# Patient Record
Sex: MALE | Race: WHITE | NOT HISPANIC OR LATINO | Employment: OTHER | URBAN - METROPOLITAN AREA
[De-identification: names, ages, dates, MRNs, and addresses within clinical notes are randomized per-mention and may not be internally consistent; named-entity substitution may affect disease eponyms.]

---

## 2022-01-19 ENCOUNTER — DOCUMENTATION ONLY (OUTPATIENT)
Dept: ADMINISTRATIVE | Facility: OTHER | Age: 67
End: 2022-01-19

## 2022-01-20 NOTE — PROGRESS NOTES
NAME: Seven Freire   : 1955 SEX: ADRIÁN MR#: [id number]   DIAGNOSIS: 1.  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  Diagnosis in 2022.  Unknown primary and exam under anesthesia with tonsillectomy and random biopsies pending.    2. PET/CT shows indeterminate hypermetabolism without CT lesion in the sternum and L1 / L2 vertebral body.     REFERRING PHYSICIAN: Hudson Seals M.D.   DATE OF CONSULTATION: 2022     HISTORY OF PRESENT ILLNESS:  The patient is a 66-year-old  white male.  According to the patient and his wife, in 2021 after a 20 year history of narcotic pain medicine use, the patient stopped pain medicines cold turkey and has been off all narcotic pain medicines since September.  According to the patient, he has not felt quite right since September and he has severe fatigue.     On January 3 of this year, the patient saw Dr. Seals with complaints of a right neck mass that he had noticed since September.     On  of this year, a CT scan of the neck was performed with IV contrast.  I have reviewed the images as well as read the radiologist's interpretation.  There is a 3.2 cm peripheral enhancing mass in the right level III region of the neck.  Central hypodensity is noted consistent with necrosis.  There are several other smaller lymph nodes bilaterally but none of them are pathologic size.  The mass does abut the right common carotid artery.  The patient has severe narrowing of the left internal carotid artery.      Also on , a CT scan of the chest was performed with IV contrast.  I have reviewed the images as well as read the radiologist's interpretation.  There are no definite abnormalities except for emphysema.     On , the patient saw Dr. eSals.  His physical examination confirmed the right level II lymph node.  Dr. Seals's physical examination was negative for the site of the presumed head/neck  primary.    On January 7, a fine needle aspirate of the right neck was performed.  Pathology showed squamous cell carcinoma.  I have requested p16 staining as well as Jorge-Barr virus staining be performed.     On January 17 of this year, a PET-CT scan was performed for both diagnostic as well as radiation therapy treatment planning purposes.  The scan was performed with and without IV contrast.  I have reviewed the images as well as read the radiologist's interpretation.  There is a hypermetabolic 3.8 cm enhancing right level III lymph node.  There is also an immediately adjacent 12 mm lymph node that is also hypermetabolic.  There is no suggestion of a primary tumor.  The radiologist mentioned a small area of hypermetabolism noted in the sternum, and I think this is indeterminate and not completely convincing for cancer.  There is also an area of hypermetabolism in the L2 vertebral body (this is assuming the patient has five lumbar spine).  This area in the L-spine also is indeterminate and not completely convincing for cancer.     Current CBC and comprehensive metabolic profile are unremarkable, and the patient has an EGFR of 81.  The patient has been referred for Radiation Oncology consultation.     The patient's greatest symptom is a mass in his right neck.  He occasionally has pain in this mass and occasionally the pain can be as severe as a 5/10 scale pain.  Currently he is controlling the pain fairly well with only ibuprofen.  He denies any other pain except for a number of decade history of back pain.  As previously mentioned, he was on narcotic pain medications for 20 years, but as of September of last year he is off all narcotic pain medicines.     The patient denies swallowing problems or hoarseness.  The patient denies cranial nerve symptoms.  The patient does complain of a lot of fatigue since getting off all narcotic pain medicines in September of last year.  The patient does still have a fairly  good performance status.  His weight is stable at 194 pounds.    PAST MEDICAL HISTORY:  The patient has filled out forms include symptoms as well as past medical history, and relevant factors have been reviewed.     Medical:  Squamous cell carcinoma metastatic to right cervical lymph node, hypertension, 20 year history of narcotic pain medicine use because of back pain.     No previous radiation therapy or chemotherapy history.    Previous surgeries:  None.    FAMILY HISTORY:  Positive for a sister with leukemia.    TOBACCO HISTORY:  The patient smokes a pack a day for a total of 30-40 years.  He is still smoking.  The patient has stopped smoking sometimes in the past, although currently is still smoking a pack a day.    ALCOHOL:  Daily three drinks.    ALLERGIES:  No known allergies.    MEDICATIONS:  The patient is on two medications and they are recorded in the clinic records.    PHYSICAL EXAMINATION:  Exam shows a man in no distress.  He has a fairly good performance status (performance score = 1).  There is no left cervical lymphadenopathy.  The right neck shows a 5 cm level III lymph node that is highly tethered in the right mid neck.  Cranial nerve examination is unremarkable.  Examination of the oral cavity shows that the patient has almost all of his upper and lower teeth and they appear to be in okay condition.  I do not see any suspicious oral cavity or oropharynx lesions.     Fiberoptic examination of the larynx and entire pharynx was completely unremarkable and no evidence for primary tumor.    DIAGNOSTIC DATA:  See History of Present Illness.    ASSESSMENTS/GOALS/PLAN:  The patient has a history of squamous cell carcinoma metastatic to a level III right cervical lymph node.  The primary is unknown and exam under anesthesia with tonsillectomy and random biopsies pending.  The current PET/CT shows indeterminate hypermetabolism without CT lesion in the sternum and L1 / L2 vertebral body.      I have  reviewed the NCCN guidelines for appropriate workup and treatment.  I have also discussed his case with Dr. Seals.  I have discussed this case in detail with the patient and his wife.  There is a very high likelihood for primary carcinoma being in the pharynx or the larynx, although there is no evidence for a primary tumor on history, physical exam, PET scan, or CT scan.  As per NCCN recommendations, I have ordered that the fine needle aspirate of the right neck have staining for human papilloma virus and Jorge-Barr virus.  On January 28, the patient will be taken to surgery by Dr. Seals and an exam under anesthesia with tonsillectomy and biopsies of suspected possible sites of primary will be performed.  Since I think there is a very high likelihood that the patient will need radiation treatment, I have gotten the patient scheduled to see the oncologic dentist on January 26.  In the future, the patient will be referred for speech and swallowing.  The patient was told in the strongest terms that he must stop smoking or suffer very serious consequences.  I offered stop smoking therapy for the patient but he thinks he will be able to stop on his own just as he was able to cold turkey stop narcotic pain medications.  I will see the patient back in follow up following the upcoming January 28 exam under anesthesia with biopsies.   Over 115 minutes were spent in consultation of which at least 35 minutes were spent in record and image review, 65 minutes were spent directly with the patient and his wife, and 15 minutes were spent for documentation and guideline review.     I would like to thank all of Mr. Freire' physicians for the opportunity to consult on their patients.  Any of these physicians should feel free to call me with any questions or comments.      Electronically Signed By:  MARISA Uriarte/Gabe  DD:  01/20/2022  DT:  01/20/2022  Job #:   [id number]    CC:  Hudson Seals M.D., [address] [place] [address], Fax: [phone]    Ewa Jaramillo M.D., [address] [place] [id number], Fax: [phone]

## 2022-02-02 ENCOUNTER — DOCUMENTATION ONLY (OUTPATIENT)
Dept: ADMINISTRATIVE | Facility: OTHER | Age: 67
End: 2022-02-02

## 2022-02-03 NOTE — PROGRESS NOTES
2022      Hudson Seals M.D.   [address] [facility] [place] [address]      RE: Seven Freire    MR#:  [id number]    :  1955       DX:     1.  Squamous cell carcinoma metastatic to level III right cervical lymph nodes.  Diagnosis in 2022.  Unknown primary.  Group clinical stage SHERWIN. cT0  (imaging, exam under anesthesia with tonsillectomy and random biopsies are all negative for a primary)   cN2b  ( 3.8 cm and immediately adjacent 1.2 cm level III right cervical lymph nodes)  cM0.  Heavy smoking history and probably p16 negative, although insufficient tissue for p16 or EBV  evaluation on the right neck  biopsy.    2.  PET/CT shows indeterminate hypermetabolism without CT lesion in the sternum and L1 /L2 vertebral body.         Dear Hudson:     I am seeing your patient in follow up today, and this note will supplement information since I saw the patient in consultation on  of this year.  I had requested p16 and Jorge-Barr virus (EBV) testing on the  biopsy.  Unfortunately, there was insufficient to allow this test to be performed.     On , the patient saw the oncologic dentist and seven teeth were recommended for extraction.  The seven teeth were in fact extracted on .     On , you took the patient to surgery for an exam under anesthesia, bilateral tonsillectomies, biopsies of the base of tongue and nasopharynx, rigid esophagoscopy and direct laryngoscopy.  All the base of tongue biopsies, nasopharynx biopsies and the bilateral tonsillectomy biopsies were all negative for cancer.     The  patient admitted that he has not been able to stop smoking.  The patient will be talking with Dr. Jaramillo about starting Chantix so that he will be able to stop smoking.  The patient does have some postoperative pain.  He has run out of his pain medications and I got him a prescription for Percocet 5.     Assessment/Plan:  The patient has a true  unknown primary, although almost certainly this is an unknown primary from head and neck origin.  I think that chances are very high that this is p16 negative given the patient's heavy smoking history, although there was insufficient tissue to have this evaluated on the January 7 biopsy.      I have reviewed the NCCN guidelines and I have discussed the case in detail with you on the phone this morning.  According the NCCN guidelines, the top option for treatment in this situation is a neck dissection and then the patient would definitely need postoperative irradiation.  If for whatever reason, neck dissection was not performed, concurrent irradiation and chemotherapy was a possibility, although this was not the top choice.  I had a detailed discussion with the patient and his wife about this and they are in agreement.  After discussion with you, I will be getting the patient scheduled to see Dr. Sellers in the near future. The images are in Epic so that Dr. Sellers can look at the January 17 PET-CT scans and also the CT scans with IV contrast.   I have very strongly encouraged the patient to do whatever is possible to stop smoking.   Forty minutes were spent in this follow up of which five minutes were spent in record review, 25 minutes were spent directly with the patient and his wife, and 10 minutes were spent on documentation and guideline review.     I appreciate the opportunity to consult on your patients.  Please call me with any questions or comments.     Sincerely,        Electronically Signed By:  MARISA Uriarte/Gabe  DD:  02/02/2022  DT:  02/02/2022  Job #:  [id number]    CC:  Ewa Jaramillo M.D., [address] [place] [address], Fax: [phone]        Juanita Sellers M.D., [address], [place] [address], Fax: [phone]

## 2022-02-08 ENCOUNTER — TELEPHONE (OUTPATIENT)
Dept: HEMATOLOGY/ONCOLOGY | Facility: CLINIC | Age: 67
End: 2022-02-08
Payer: MEDICARE

## 2022-02-08 NOTE — NURSING
Received referral from Dr. Wallace for patient to see Dr. Sellers. Appointment has been schedule and reviewed with patient. Directions provided to patient and he verbalized understanding    Oncology Navigation   Intake  Cancer Type: Head and Neck  Internal / External Referral: External  Referral Source: Dr. Boudreaux and Dr. Seals  Date of Referral: 2/7/2022  Initial Nurse Navigator Contact: 2/8/2022  Referral to Initial Contact Timeline (days): 1  Date Worked: 2/8/2022  First Appointment Available: 2/15/2022  Appointment Date: 2/15/2022  First Available Date vs. Scheduled Date (days): 0     Treatment     Surgical Oncologist: Dr. Juanita Sellers  Type of Surgery: Possible Neck Dissection  Consult Date: 2/15/2022       Radiation Oncologist: Dr. Christian Jean consult on 1/19/22    Procedures: Biopsy  Biopsy Schedule Date: 1/7/2022 ([facility] Biopsy)          Radiation Oncologist: Dr. Christian Jean consult on 1/19/22        Acuity      Follow Up  No follow-ups on file.

## 2022-02-15 ENCOUNTER — OFFICE VISIT (OUTPATIENT)
Dept: HEMATOLOGY/ONCOLOGY | Facility: CLINIC | Age: 67
End: 2022-02-15
Payer: MEDICARE

## 2022-02-15 ENCOUNTER — LAB VISIT (OUTPATIENT)
Dept: LAB | Facility: HOSPITAL | Age: 67
End: 2022-02-15
Attending: OTOLARYNGOLOGY
Payer: MEDICARE

## 2022-02-15 VITALS
SYSTOLIC BLOOD PRESSURE: 147 MMHG | DIASTOLIC BLOOD PRESSURE: 85 MMHG | HEART RATE: 96 BPM | TEMPERATURE: 98 F | BODY MASS INDEX: 27.23 KG/M2 | RESPIRATION RATE: 18 BRPM | OXYGEN SATURATION: 97 % | WEIGHT: 201.06 LBS | HEIGHT: 72 IN

## 2022-02-15 DIAGNOSIS — Z72.0 TOBACCO ABUSE: ICD-10-CM

## 2022-02-15 DIAGNOSIS — D62 ANEMIA DUE TO ACUTE BLOOD LOSS: ICD-10-CM

## 2022-02-15 DIAGNOSIS — Z01.818 PRE-OP EVALUATION: ICD-10-CM

## 2022-02-15 DIAGNOSIS — C79.89 SECONDARY SQUAMOUS CELL CARCINOMA OF HEAD AND NECK WITH UNKNOWN PRIMARY SITE: ICD-10-CM

## 2022-02-15 DIAGNOSIS — C80.1 SECONDARY SQUAMOUS CELL CARCINOMA OF HEAD AND NECK WITH UNKNOWN PRIMARY SITE: ICD-10-CM

## 2022-02-15 DIAGNOSIS — F32.A DEPRESSION, UNSPECIFIED DEPRESSION TYPE: ICD-10-CM

## 2022-02-15 DIAGNOSIS — G89.18 POST-OP PAIN: Primary | ICD-10-CM

## 2022-02-15 DIAGNOSIS — G89.18 POST-OP PAIN: ICD-10-CM

## 2022-02-15 PROBLEM — C44.92 SECONDARY SQUAMOUS CELL CARCINOMA OF HEAD AND NECK WITH UNKNOWN PRIMARY SITE: Status: ACTIVE | Noted: 2022-02-15

## 2022-02-15 LAB
BASOPHILS # BLD AUTO: 0.03 K/UL (ref 0–0.2)
BASOPHILS NFR BLD: 0.3 % (ref 0–1.9)
DIFFERENTIAL METHOD: ABNORMAL
EOSINOPHIL # BLD AUTO: 0.1 K/UL (ref 0–0.5)
EOSINOPHIL NFR BLD: 0.5 % (ref 0–8)
ERYTHROCYTE [DISTWIDTH] IN BLOOD BY AUTOMATED COUNT: 13.1 % (ref 11.5–14.5)
HCT VFR BLD AUTO: 31.4 % (ref 40–54)
HGB BLD-MCNC: 10.2 G/DL (ref 14–18)
IMM GRANULOCYTES # BLD AUTO: 0.08 K/UL (ref 0–0.04)
IMM GRANULOCYTES NFR BLD AUTO: 0.7 % (ref 0–0.5)
LYMPHOCYTES # BLD AUTO: 1.6 K/UL (ref 1–4.8)
LYMPHOCYTES NFR BLD: 13.3 % (ref 18–48)
MCH RBC QN AUTO: 33.3 PG (ref 27–31)
MCHC RBC AUTO-ENTMCNC: 32.5 G/DL (ref 32–36)
MCV RBC AUTO: 103 FL (ref 82–98)
MONOCYTES # BLD AUTO: 1.3 K/UL (ref 0.3–1)
MONOCYTES NFR BLD: 10.5 % (ref 4–15)
NEUTROPHILS # BLD AUTO: 8.9 K/UL (ref 1.8–7.7)
NEUTROPHILS NFR BLD: 74.7 % (ref 38–73)
NRBC BLD-RTO: 0 /100 WBC
PLATELET # BLD AUTO: 323 K/UL (ref 150–450)
PMV BLD AUTO: 8.5 FL (ref 9.2–12.9)
RBC # BLD AUTO: 3.06 M/UL (ref 4.6–6.2)
WBC # BLD AUTO: 11.9 K/UL (ref 3.9–12.7)

## 2022-02-15 PROCEDURE — 1101F PT FALLS ASSESS-DOCD LE1/YR: CPT | Mod: CPTII,S$GLB,, | Performed by: OTOLARYNGOLOGY

## 2022-02-15 PROCEDURE — 1160F PR REVIEW ALL MEDS BY PRESCRIBER/CLIN PHARMACIST DOCUMENTED: ICD-10-PCS | Mod: CPTII,S$GLB,, | Performed by: OTOLARYNGOLOGY

## 2022-02-15 PROCEDURE — 99999 PR PBB SHADOW E&M-EST. PATIENT-LVL V: ICD-10-PCS | Mod: PBBFAC,,, | Performed by: OTOLARYNGOLOGY

## 2022-02-15 PROCEDURE — 85025 COMPLETE CBC W/AUTO DIFF WBC: CPT | Mod: PN | Performed by: OTOLARYNGOLOGY

## 2022-02-15 PROCEDURE — 99999 PR PBB SHADOW E&M-EST. PATIENT-LVL V: CPT | Mod: PBBFAC,,, | Performed by: OTOLARYNGOLOGY

## 2022-02-15 PROCEDURE — 36415 COLL VENOUS BLD VENIPUNCTURE: CPT | Mod: PN | Performed by: OTOLARYNGOLOGY

## 2022-02-15 PROCEDURE — 3079F PR MOST RECENT DIASTOLIC BLOOD PRESSURE 80-89 MM HG: ICD-10-PCS | Mod: CPTII,S$GLB,, | Performed by: OTOLARYNGOLOGY

## 2022-02-15 PROCEDURE — 3288F PR FALLS RISK ASSESSMENT DOCUMENTED: ICD-10-PCS | Mod: CPTII,S$GLB,, | Performed by: OTOLARYNGOLOGY

## 2022-02-15 PROCEDURE — 99204 OFFICE O/P NEW MOD 45 MIN: CPT | Mod: 25,S$GLB,, | Performed by: OTOLARYNGOLOGY

## 2022-02-15 PROCEDURE — 1125F AMNT PAIN NOTED PAIN PRSNT: CPT | Mod: CPTII,S$GLB,, | Performed by: OTOLARYNGOLOGY

## 2022-02-15 PROCEDURE — 31575 DIAGNOSTIC LARYNGOSCOPY: CPT | Mod: S$GLB,,, | Performed by: OTOLARYNGOLOGY

## 2022-02-15 PROCEDURE — 1159F MED LIST DOCD IN RCRD: CPT | Mod: CPTII,S$GLB,, | Performed by: OTOLARYNGOLOGY

## 2022-02-15 PROCEDURE — 4010F ACE/ARB THERAPY RXD/TAKEN: CPT | Mod: CPTII,S$GLB,, | Performed by: OTOLARYNGOLOGY

## 2022-02-15 PROCEDURE — 4010F PR ACE/ARB THEARPY RXD/TAKEN: ICD-10-PCS | Mod: CPTII,S$GLB,, | Performed by: OTOLARYNGOLOGY

## 2022-02-15 PROCEDURE — 99204 PR OFFICE/OUTPT VISIT, NEW, LEVL IV, 45-59 MIN: ICD-10-PCS | Mod: 25,S$GLB,, | Performed by: OTOLARYNGOLOGY

## 2022-02-15 PROCEDURE — 1160F RVW MEDS BY RX/DR IN RCRD: CPT | Mod: CPTII,S$GLB,, | Performed by: OTOLARYNGOLOGY

## 2022-02-15 PROCEDURE — 3008F PR BODY MASS INDEX (BMI) DOCUMENTED: ICD-10-PCS | Mod: CPTII,S$GLB,, | Performed by: OTOLARYNGOLOGY

## 2022-02-15 PROCEDURE — 3288F FALL RISK ASSESSMENT DOCD: CPT | Mod: CPTII,S$GLB,, | Performed by: OTOLARYNGOLOGY

## 2022-02-15 PROCEDURE — 3008F BODY MASS INDEX DOCD: CPT | Mod: CPTII,S$GLB,, | Performed by: OTOLARYNGOLOGY

## 2022-02-15 PROCEDURE — 1159F PR MEDICATION LIST DOCUMENTED IN MEDICAL RECORD: ICD-10-PCS | Mod: CPTII,S$GLB,, | Performed by: OTOLARYNGOLOGY

## 2022-02-15 PROCEDURE — 1101F PR PT FALLS ASSESS DOC 0-1 FALLS W/OUT INJ PAST YR: ICD-10-PCS | Mod: CPTII,S$GLB,, | Performed by: OTOLARYNGOLOGY

## 2022-02-15 PROCEDURE — 3077F SYST BP >= 140 MM HG: CPT | Mod: CPTII,S$GLB,, | Performed by: OTOLARYNGOLOGY

## 2022-02-15 PROCEDURE — 1125F PR PAIN SEVERITY QUANTIFIED, PAIN PRESENT: ICD-10-PCS | Mod: CPTII,S$GLB,, | Performed by: OTOLARYNGOLOGY

## 2022-02-15 PROCEDURE — 3077F PR MOST RECENT SYSTOLIC BLOOD PRESSURE >= 140 MM HG: ICD-10-PCS | Mod: CPTII,S$GLB,, | Performed by: OTOLARYNGOLOGY

## 2022-02-15 PROCEDURE — 3079F DIAST BP 80-89 MM HG: CPT | Mod: CPTII,S$GLB,, | Performed by: OTOLARYNGOLOGY

## 2022-02-15 PROCEDURE — 31575 PR LARYNGOSCOPY, FLEXIBLE; DIAGNOSTIC: ICD-10-PCS | Mod: S$GLB,,, | Performed by: OTOLARYNGOLOGY

## 2022-02-15 RX ORDER — SILDENAFIL CITRATE 20 MG/1
TABLET ORAL
COMMUNITY
End: 2022-02-28 | Stop reason: CLARIF

## 2022-02-15 RX ORDER — HYDROCODONE BITARTRATE AND ACETAMINOPHEN 7.5; 325 MG/15ML; MG/15ML
15 SOLUTION ORAL EVERY 4 HOURS PRN
Qty: 500 ML | Refills: 0 | Status: SHIPPED | OUTPATIENT
Start: 2022-02-15 | End: 2022-02-15 | Stop reason: SDUPTHER

## 2022-02-15 RX ORDER — AMOXICILLIN AND CLAVULANATE POTASSIUM 875; 125 MG/1; MG/1
TABLET, FILM COATED ORAL
COMMUNITY
Start: 2022-02-14 | End: 2022-02-24

## 2022-02-15 RX ORDER — HYDROCODONE BITARTRATE AND ACETAMINOPHEN 7.5; 325 MG/15ML; MG/15ML
15 SOLUTION ORAL EVERY 4 HOURS PRN
Qty: 500 ML | Refills: 0 | Status: SHIPPED | OUTPATIENT
Start: 2022-02-15 | End: 2022-02-22

## 2022-02-15 RX ORDER — LORAZEPAM 1 MG/1
1 TABLET ORAL DAILY PRN
COMMUNITY
Start: 2022-01-19 | End: 2022-08-29

## 2022-02-15 NOTE — PROGRESS NOTES
Date of Encounter: 2/15/2022  Provider: Juanita Sellers MD  Referring MD: Hudson Seals MD  PCP: Ewa Jaramillo MD  Rad Onc: Christian Wallace MD  Med Onc:    CC:  Unknown primary of the head neck Kimberlee juares and Christian Wallace, [id number] [facility]    HPI:    Patient is a 66-year-old male who presented for evaluation and treatment of neck disease of an unknown primary of the head neck by Donya Seals and Christian Wallace.  HPV and EBV status unknown at this time due to lack of pathologic material.  NCCN guidelines recommend up front neck dissection followed with radiation therapy +/- chemo pending final path as per Dr Wallace's note.  On presentation today he denies neck pain, otalgia, dysphagia, odynophagia.    Patient reports that he bled heavily following tonsillectomy.  He presented to a local urgent care.  Patient was told that he had an infection it was prescribed Augmentin.  He has been very weak following that episode and due to low blood pressure he has stopped his antihypertensives, Plavix and aspirin.  He did not consult with his physician 1st.    Patient also has a history of tobacco abuse and continues to smoke a pack of cigarettes a day.  He is not interested in tobacco cessation at this time.  He also has a history of depression for which he was taken Wellbutrin.  He also discontinued this as he thought it was not helping him    ROS: see HPI  Constitutional: Negative for activity change and appetite change, weight loss.   Eyes: Negative for discharge, visual changes.   Respiratory: Negative for difficulty breathing and wheezing   Cardiovascular: Negative for chest pain.   Gastrointestinal: Negative for abdominal distention and abdominal pain.   Endocrine: Negative for cold intolerance and heat intolerance.   Genitourinary: Negative for dysuria.   Musculoskeletal: Negative for gait problem, muscle pain and joint swelling.   Skin: Negative for color change and pallor; negative for skin lesions.    Neurological: Negative for syncope and weakness; no numbness face.   Psychiatric/Behavioral: Negative for agitation and confusion; negative for depression.    Physical Exam:      Constitutional  · General Appearance: well nourished, well-developed, alert, oriented, in no acute distress  · Communication: ability, understanding, normal  Head and Face  · Inspection: normocephalic, atraumatic, no scars, lesions or masses   · Palpation: no stepoffs, sinus tenderness or masses  · Parotid glands: no masses, stones, swelling or tenderness  Eyes  · Ocular Motility / Alignment: normal alignment, motility, no proptosis, enophthalmus or nystagmus  · Conjunctiva: not injected  · Eyelids: no hooding, lag, entropion, or ectropion  Nose  · External Nose: no lesions, tenderness, trauma or deformity  · Intranasal Exam: no edema, erythema, discharge, mass or obstruction  Oral Cavity / Oropharynx  · Lips: upper and lower lips pink and moist  · Teeth: good dentition  · Gingiva: healthy  · Oral Mucosa: moist, no mucosal lesions  · Floor of Mouth: normal, no lesions, salivary ducts patent  · Tongue: moist, normal mobility, no lesions  · Palate: soft and hard palates without lesions or ulcers  Oropharynx: tonsils absent; white eschar present bilaterally; no bleeding  Nasopharynx, Hypopharynx, and Larynx  · Indirect: could not perform exam due to intolerance by patient  Neck  · Inspection and Palpation: no erythema, induration, emphysema, tenderness or masses  · Larynx and Trachea: normal position; normal crepitus  · Thyroid: no tenderness, enlargement or nodules  · Submandibular Glands: no masses or tenderness  Lymphatic:  · Anterior, Posterior, Submandibular, Submental, Supraclavicular: right level 3 LN, firm and slightly mobile-feels attached to SCM  Chest / Respiratory  · Chest: no stridor or retractions, normal effort and expansion  Cardiovascular:  · Pulses: 2+ carotid pulses  bilaterally  · Auscultation: deferred  Neurological  · Cranial Nerves: grossly intact  · General: no focal deficits  Psychiatric  · Orientation: oriented to time, place and person  · Mood and Affect: no depression, anxiety or agitation  Extremities  · Inspection: moves all extremities well    PROCEDURES:   -------------------- LARYNGOSCOPY / NASOPHARYNGOSCOPY -------------------------     Pre-op DX: unknown primary  Post-op DX: same  Anesthesia: Topical Neosynephrine / Tetracaine  Indications: to look at larynx and pharynx  Adverse Events: None        Procedure in Detail:     Flexible endoscopy with video was performed through the nasal passages. The nasal cavity, nasopharynx, oropharynx, hypopharynx and larynx were adequately visualized. The true vocal cords and arytenoids were examined during phonation and repose.      Operative Findings:     Nasal Cavity: Within normal limits  Nasopharynx: Within normal limits  Tongue Base: Within normal limits  Pharyngeal Walls: Within normal limits  Epiglottis / Aryepiglottic Folds: Within normal limits  Pyriform Sinus: Within normal limits  Vocal Cords: Within normal limits  Arytenoids: Within normal limits; moderate IA edema     Pet CT: no formal report for my review  No primary or distant mets seen.  Hypermetabolic levels 2 and 3 lymph nodes    Images Reviewed: PET CT; CT neck with contrast:  Right-sided lymph nodes appear to involve the right sternocleidomastoid muscle and possibly the internal jugular vein versus compression of the vein    Path:  .  LEFT BASE OF TONGUE BIOPSY:   - NO TUMOR SEEN.     2.  RIGHT BASE OF TONGUE BIOPSIES:   - NO TUMOR SEEN.       3.  LEFT NASOPHARYNX BIOPSY:   - NO TUMOR SEEN.     4.  RIGHT NASOPHARYNX BIOPSY:   - NO TUMOR SEEN.       5.  RIGHT TONSIL, EXCISION:   - NO TUMOR SEEN.     6.  LEFT TONSIL, EXCISION:   - NO TUMOR SEEN.       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  Risks, benefits and alternatives were discussed at length with the patient and his wife using medical of the stray shins, questions were answered.  Risks include but are not limited to bleeding, scarring, infection, hematoma, seroma, Chyle leak, numbness of the skin of the neck for 6-12 months, soft tissue defect due to removal of tissue, unsatisfactory cosmesis, permanent numbness of the earlobe, edema of the neck and face postoperatively, injury to the cranial nerves with weak lower lip, tongue, shoulder with chronic pain, diaphragm, hoarseness and upper extremity and injury to the great vessels of the neck with bleeding stroke or death  Surgery is scheduled for March 2, 2022  He was instructed to resume his home meds.  He is to see his primary care this week.  CBC to evaluate anemia/blood loss    Addendum:  Hemoglobin 14.0 - 18.0 g/dL 10.2 Low   14.7    Hematocrit 40.0 - 54.0 % 31.4 Low   43.1      Will type and screen patient for 2 U PRBC's    Time spent with patient was 50 minutes which was greater than 50% time examining him

## 2022-02-15 NOTE — TELEPHONE ENCOUNTER
----- Message from Dayana Hutchins sent at 2/15/2022  4:01 PM CST -----  Type: Needs Medical Advice  Who Called:  Patient  Symptoms (please be specific):    How long has patient had these symptoms:    Pharmacy name and phone #:    Best Call Back Number: [phone]  Additional Information: Patient is requesting a call back regarding meds.   hydrocodone-acetaminophen (HYCET) solution 7.5-325 mg/15mL

## 2022-02-15 NOTE — TELEPHONE ENCOUNTER
Pt went to [facility] and prescription was not received.  Prescription pended to Dr. Sellers.

## 2022-02-16 ENCOUNTER — TELEPHONE (OUTPATIENT)
Dept: HEMATOLOGY/ONCOLOGY | Facility: CLINIC | Age: 67
End: 2022-02-16
Payer: MEDICARE

## 2022-02-16 NOTE — NURSING
Chart reviewed. Scheduled for surgery 3/2/22. Will follow up    Oncology Navigation   Intake  Cancer Type: Head and Neck  Internal / External Referral: External  Referral Source: Dr. Boudreaux and Dr. Seals  Date of Referral: 2/7/2022  Initial Nurse Navigator Contact: 2/8/2022  Referral to Initial Contact Timeline (days): 1  Date Worked: 2/16/2022  First Appointment Available: 2/15/2022  Appointment Date: 2/15/2022  First Available Date vs. Scheduled Date (days): 0     Treatment  Current Status: Staging work-up    Surgical Oncologist: Dr. Juanita Sellers  Type of Surgery: right modified radical neck dissection  Consult Date: 2/15/2022  Surgery Schedule Date: 3/2/2022       Radiation Oncologist: Dr. Christian Jean consult on 1/19/22    Procedures: Biopsy  Biopsy Schedule Date: 1/7/2022 ([facility]NA Biopsy)          Radiation Oncologist: Dr. Christian Jean consult on 1/19/22        Acuity      Follow Up  No follow-ups on file.

## 2022-02-22 DIAGNOSIS — D62 ANEMIA DUE TO ACUTE BLOOD LOSS: Primary | ICD-10-CM

## 2022-02-23 ENCOUNTER — TELEPHONE (OUTPATIENT)
Dept: HEMATOLOGY/ONCOLOGY | Facility: CLINIC | Age: 67
End: 2022-02-23
Payer: MEDICARE

## 2022-02-23 DIAGNOSIS — Z01.818 PRE-OP EVALUATION: Primary | ICD-10-CM

## 2022-02-23 NOTE — TELEPHONE ENCOUNTER
Called [facility] surgery center and arranged pre op appointment for pt on 2/28/22.    Called pt and reviewed pre op instructions and future appointments.   All questions answered to pt's satisfaction.    Request for cardiac clearance faxed to Dr. Cummings at [phone].

## 2022-02-28 ENCOUNTER — TELEPHONE (OUTPATIENT)
Dept: HEMATOLOGY/ONCOLOGY | Facility: CLINIC | Age: 67
End: 2022-02-28
Payer: MEDICARE

## 2022-02-28 NOTE — TELEPHONE ENCOUNTER
Received cardiac clearance for surgery from [facility].      Called and notified pt/wife that clearance given to hold Plavix and ASA 5 days prior to surgery.  Wife stated that pt has been holding Plavix and ASA in anticipation for surgery.

## 2022-03-02 PROBLEM — R09.02 HYPOXEMIA: Status: ACTIVE | Noted: 2022-03-02

## 2022-03-02 PROBLEM — I25.10 CAD (CORONARY ARTERY DISEASE): Status: ACTIVE | Noted: 2022-03-02

## 2022-03-02 PROBLEM — I10 HTN (HYPERTENSION): Status: ACTIVE | Noted: 2022-03-02

## 2022-03-04 ENCOUNTER — TELEPHONE (OUTPATIENT)
Dept: HEMATOLOGY/ONCOLOGY | Facility: CLINIC | Age: 67
End: 2022-03-04
Payer: MEDICARE

## 2022-03-04 NOTE — NURSING
Chart reviewed. Patient is currently Post-op DISSECTION, NECK, RADICAL - Modified Radical with reconstruction. Admitted to [facility]. Continuing to follow for needs

## 2022-03-07 DIAGNOSIS — C80.1 SECONDARY SQUAMOUS CELL CARCINOMA OF HEAD AND NECK WITH UNKNOWN PRIMARY SITE: ICD-10-CM

## 2022-03-07 DIAGNOSIS — G89.18 POST-OP PAIN: ICD-10-CM

## 2022-03-07 DIAGNOSIS — C79.89 SECONDARY SQUAMOUS CELL CARCINOMA OF HEAD AND NECK WITH UNKNOWN PRIMARY SITE: Primary | ICD-10-CM

## 2022-03-07 DIAGNOSIS — C80.1 SECONDARY SQUAMOUS CELL CARCINOMA OF HEAD AND NECK WITH UNKNOWN PRIMARY SITE: Primary | ICD-10-CM

## 2022-03-07 DIAGNOSIS — C79.89 SECONDARY SQUAMOUS CELL CARCINOMA OF HEAD AND NECK WITH UNKNOWN PRIMARY SITE: ICD-10-CM

## 2022-03-07 RX ORDER — HYDROMORPHONE HYDROCHLORIDE 2 MG/1
2 TABLET ORAL EVERY 4 HOURS PRN
Qty: 28 TABLET | Refills: 0 | Status: SHIPPED | OUTPATIENT
Start: 2022-03-07 | End: 2022-08-29 | Stop reason: ALTCHOICE

## 2022-03-10 NOTE — PROGRESS NOTES
Date of Encounter: 2/15/2022  Provider: Juanita Sellers MD  Referring MD: Hudson Seals MD  PCP: Ewa Jaramillo MD  Rad Onc: Christian Wallace MD  Med Onc:    CC:  Unknown primary of the head neck Kimberlee juares and Christian Wallace, [id number] SCCA    HPI:    Patient is a 66-year-old male who presented for evaluation and treatment of neck disease of an unknown primary of the head neck by Donya Seals and Christian Wallace.  HPV and EBV status unknown at this time due to lack of pathologic material.  NCCN guidelines recommend up front neck dissection followed with radiation therapy +/- chemo pending final path as per Dr Wallace's note.  On presentation today he denies neck pain, otalgia, dysphagia, odynophagia.    Patient reports that he bled heavily following tonsillectomy.  He presented to a local urgent care.  Patient was told that he had an infection it was prescribed Augmentin.  He has been very weak following that episode and due to low blood pressure he has stopped his antihypertensives, Plavix and aspirin.  He did not consult with his physician 1st.    Patient also has a history of tobacco abuse and continues to smoke a pack of cigarettes a day.  He is not interested in tobacco cessation at this time.  He also has a history of depression for which he was taken Wellbutrin.  He also discontinued this as he thought it was not helping him    3/15/2022  Patient is here today for post op visit. Staples were removed last week.  He has no complaints.  He was seen by Dr. Wallace last Friday who noted an also on his right soft palate.  Patient reports that this is painful only when it is touched.  Patient also noted neck swelling starting about 2 days ago.    Patient continues to using nicotine patch and has not smoke since surgery.    ROS: see HPI  Constitutional: Negative for activity change and appetite change, weight loss.   Eyes: Negative for discharge, visual changes.   Respiratory: Negative for difficulty  "breathing and wheezing   Cardiovascular: Negative for chest pain.   Gastrointestinal: Negative for abdominal distention and abdominal pain.   Endocrine: Negative for cold intolerance and heat intolerance.   Genitourinary: Negative for dysuria.   Musculoskeletal: Negative for gait problem, muscle pain and joint swelling.   Skin: Negative for color change and pallor; negative for skin lesions.   Neurological: Negative for syncope and weakness; no numbness face.   Psychiatric/Behavioral: Negative for agitation and confusion; negative for depression.    Physical Exam:      Constitutional  · General Appearance: well nourished, well-developed, alert, oriented, in no acute distress  · Communication: ability, understanding, normal  Oral Cavity / Oropharynx  · Lips: upper and lower lips pink and moist  · Teeth: good dentition  · Gingiva: healthy  · Oral Mucosa: moist, no mucosal lesions  · Floor of Mouth: normal, no lesions, salivary ducts patent  · Tongue: moist, normal mobility, no lesions  · Palate: < 1 cm  Ulcer right soft palate with minimal induration  Neck  · Inspection and Palpation: no erythema, induration, emphysema, incision intact; large amount of fluid under flap consistent with seroma; pedicle of pec major flap seen crossing over clavicle  Neurological  · Cranial Nerves: grossly intact; right marginal nerve paresis  · General: no focal deficits  ·   Chest:          Incision intact; indentation in area of pec major flap; no hematoma  Extremities  · Inspection: decrease abduction RUE     Path:    1. LYMPH NODE, EXTERNAL JUGULAR VEIN, EXCISION:       --ONE LYMPH NODE WITH NO MORPHOLOGIC EVIDENCE OF MALIGNANCY (0/1).     2. RIGHT NECK, FURTHER DESIGNATED "LEVELS 2A, 2B, 3, PLUS SCM, INTERNAL    JUGULAR VEIN," DISSECTION:   --FOUR OUT OF NINETEEN LYMPH NODES ARE POSITIVE FOR METASTATIC    KERATINIZING SQUAMOUS CELL         CARCINOMA (4/19).   --LARGEST PART 2 SPECIMEN METASTATIC DEPOSIT: APPROXIMATELY 31 " "   MILLIMETERS.       --EXTRANODAL EXTENSION:  PRESENT.       --NO PERINEURAL INVASION IDENTIFIED.       --SKELETAL MUSCLE INVOLVED BY METASTATIC SQUAMOUS CELL CARCINOMA.   --PD-L1 STUDY AND EBV IMMUNOHISTOCHEMISTRY ARE PENDING AND WILL BE    REPORTED IN ADDENDA.     3. RIGHT NECK, FURTHER DESIGNATED "LEVELS 4 AND 5," DISSECTION:   --ONE OUT OF NINETEEN LYMPH NODES IS POSITIVE FOR METASTATIC    KERATINIZING SQUAMOUS CELL CARCINOMA         (1/19).   --LARGEST PART 3 SPECIMEN METASTATIC DEPOSIT: APPROXIMATELY 8    MILLIMETERS.       --EXTRANODAL EXTENSION:  PRESENT.       --NO PERINEURAL INVASION IDENTIFIED.       --SKELETAL MUSCLE WITH NO MORPHOLOGIC EVIDENCE OF MALIGNANCY.     Human Papillomavirus (HPV) Testing:   p16 Immunohistochemistry (IHC) as a Surrogate for Transcriptionally    Active High-Risk HPV: NEGATIVE (no staining identified).   EBV negative  PDL 1 positive (85)       Assessment:   Unknown primary of the head and neck status post right modified radical neck dissection and pectoralis major Levar fascial flap--P 16 negative; PDL1 positive  Postoperative seroma right neck  Decreased abduction right upper extremity as expected  Small also right soft palate consistent with trauma  Right marginal nerve paresis; tumor closely abutted this area    Plan:  150 cc of says serous fluid aspirated from right neck consistent with seroma  Referred to physical therapy and occupational therapy  Follow-up in 1 week for re-evaluation of the seroma and soft palate ulcer.  It did persist a biopsy will be performed  "

## 2022-03-11 ENCOUNTER — DOCUMENTATION ONLY (OUTPATIENT)
Dept: ADMINISTRATIVE | Facility: OTHER | Age: 67
End: 2022-03-11
Payer: MEDICARE

## 2022-03-11 ENCOUNTER — CLINICAL SUPPORT (OUTPATIENT)
Dept: HEMATOLOGY/ONCOLOGY | Facility: CLINIC | Age: 67
End: 2022-03-11
Payer: MEDICARE

## 2022-03-11 VITALS
BODY MASS INDEX: 25.9 KG/M2 | TEMPERATURE: 98 F | HEART RATE: 79 BPM | SYSTOLIC BLOOD PRESSURE: 135 MMHG | DIASTOLIC BLOOD PRESSURE: 71 MMHG | RESPIRATION RATE: 14 BRPM | WEIGHT: 191 LBS | OXYGEN SATURATION: 95 %

## 2022-03-11 PROCEDURE — 99999 PR PBB SHADOW E&M-EST. PATIENT-LVL II: CPT | Mod: PBBFAC,,,

## 2022-03-11 PROCEDURE — 99999 PR PBB SHADOW E&M-EST. PATIENT-LVL II: ICD-10-PCS | Mod: PBBFAC,,,

## 2022-03-12 NOTE — PROGRESS NOTES
2022      Hudson Seals M.D.   [address] [place] [place] [address]      RE: Seven Freire    MR#:  [id number]    :  1955       DX:     1.  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  Right modified radical neck dissection,in 2022.  Unknown head and neck primary.   Group pathologic stage IVB  cT0  pN3b (5/39 right cervical lymph nodes were positive including  major extracapsular extension to the sternocleidomastoid muscle, internal jugular vein, adventitia of the common carotid artery, and the right pharyngeal constrictors muscles;  largest lymph node was 3.1 cm)    cM0.  2. My  exam shows a small ulcer  lesion in the right soft palate.  Biopsy will be considered when the patient see Dr. Sellers on March 15.      Dear Hudson:     I am seeing your patient in follow up today, and this note will supplement information since I saw the patient on .  Shortly before I saw the patient, on , the patient  had 9 teeth extracted.     On  of this year, the patient was taken to surgery and had a right modified radical neck dissection, preserving the sternocleidomastoid muscle, although some of the sternocleidomastoid muscle had to be resected along with the internal jugular vein, pharyngeal constrictor muscle, and subadventitial dissection of the right common carotid artery.  A right pectoralis major myocutaneous flap was utilized to reconstruct a 2 cm area of the pharynx that had to be removed because of the tumor extension.  This myocutaneous flap also covered the carotid artery.  Pathology from levels 2A, 2B,  and III showed 4/19 lymph nodes were involved with metastatic squamous cell carcinoma.  The largest lymph node was a 3.1 cm.   There was extensive extranodal extension as was already known about from the clinical description at surgery.  No perineural invasion was noted.  Tumor involved  skeletal muscle.   Pathology from  levels 4 and 5 1/19 nodes positive and this was an 8 mm lymph node with extracapsular extension.  Therefore, a total of 5/39 nodes were  positive.    Human papilloma virus and Jorge-Barr virus were negative.     On March 7, the patient was discharged from the hospital.  He recovered well from surgery.  Recent CBC is fairly unremarkable and recent basic metabolic profile is fairly unremarkable.     The patient feels like he is doing reasonably well since the time of surgery.  When I asked the patient about smoking, he said he has almost completely stopped smoking although he is vaping.  I strongly encouraged the patient stop all nicotine use.    PHYSICAL EXAMINATION:  GENERAL:  Shows a man in no distress.   NECK:  The left neck shows no lymphadenopathy.  The right neck shows a scar consistent with the recent modified radical neck dissection.  There is no residual or recurrent lymphadenopathy in either the right or left neck.  A scar is present, consistent with the harvesting of the myocutaneous flap.   CRANIAL NERVES:  Remarkable in that the patient has paresis of the right marginal mandibular branch of the 7th cranial nerve as would be typical for surgery with this size of lymph nodes.     MOUTH:  Oral cavity examination shows that the patient appears to be well healed from the 9 dental extractions.  There is definitely an ulcer in the right soft palate near the junction of the right hard palate.  It is unclear whether this could represent tumor, but most likely is related to some trauma caused by intubation.  I questioned the patient and he said that he did notice it sometime after surgery and this area was tender.    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  Right modified radical neck dissection,in March 2022.  Unknown head and neck primary.    Group pathologic stage IVB  cT0  pN3b (5/39 right cervical lymph nodes were positive including  major extracapsular extension to the sternocleidomastoid muscle, internal jugular vein, adventitia of the common carotid artery, and the right pharyngeal constrictors muscles;  largest lymph node was 3.1 cm)    cM0.    I have discussed the case with you and I have reviewed the National Comprehensive Cancer Network Guidelines for appropriate treatment recommendations.  Irradiation with concurrent cisplatin is recommended as a category 1 recommendation.  The patient had a tremendous amount of extranodal extension and this is a major risk factor.      I had a lengthy discussion with the patient and his wife, and they are in agreement with this strategy of postoperative irradiation with concurrent chemotherapy.  I think that the patient will tolerate weekly concurrent cisplatin.  The patient will be seeing Dr. Kalpesh Haddad in the near future to make preparations.  The patient will be seeing the oncologic dentist to get cleared from the previous dental extractions and to make impressions for the prescription fluoride carriers after completion of radiation.  The patient has been referred for speech and swallowing.  Once again the patient was very strongly advised to stop smoking.      The patient will be seeing Dr. Sellers on March 15, and I discussed the case with her.  Dr. Sellers said she would consider biopsy of this lesion in the right soft palate, although likely it may just be an ulcer related to intubation.       On March 21, radiation therapy treatment planning scans will be performed and at the same time I will perform a diagnostic CT of the neck and chest with intravenous contrast.  Greater than 100 minutes was spent on this lengthy follow up, of which 20 minutes was spent in record review, 65 minutes was spent directly with the patient and his wife, and 15 minutes was spent on documentation and guideline review.     Side  effects and possible complications.  The patient and his wife were informed of the possible acute effects which will include skin reaction, stomatitis, esophagitis, xerostomia, and loss of taste.  Late effects could include skin, soft tissue, bone injury or the possibility of permanent xerostomia or decrease in loss of taste.  I have reviewed a site-specific consent form.  All questions were answered and they have good understanding.  The patient has signed the site-specific consent form.     I appreciate opportunity to consult on your patients.  Please call me with any questions or comments.     Sincerely,         Electronically Signed By:  MARISA Uriarte/MedQ  DD:  03/11/2022  DT:  03/11/2022  Job #:  [id number]    CC:  Kalpesh Haddad M.D., [address] [place] [address], Fax: [phone]     Ewa Jaramillo M.D., [address] [place] [address], Fax: [phone]      Juanita Sellers M.D., [address], [place] [address], Fax: [phone]

## 2022-03-14 ENCOUNTER — TELEPHONE (OUTPATIENT)
Dept: HEMATOLOGY/ONCOLOGY | Facility: CLINIC | Age: 67
End: 2022-03-14
Payer: MEDICARE

## 2022-03-14 NOTE — NURSING
Chart reviewed. Patient is post op by Dr. Sellers. He has seen Dr. Wallace. He will continue his follow up with Rad/Onc and Hem/Onc at [facility] for his treatment planning

## 2022-03-15 ENCOUNTER — NUTRITION (OUTPATIENT)
Dept: INFUSION THERAPY | Facility: HOSPITAL | Age: 67
End: 2022-03-15
Attending: OTOLARYNGOLOGY
Payer: MEDICARE

## 2022-03-15 ENCOUNTER — OFFICE VISIT (OUTPATIENT)
Dept: HEMATOLOGY/ONCOLOGY | Facility: CLINIC | Age: 67
End: 2022-03-15
Payer: MEDICARE

## 2022-03-15 VITALS
HEART RATE: 81 BPM | SYSTOLIC BLOOD PRESSURE: 149 MMHG | OXYGEN SATURATION: 96 % | WEIGHT: 194.69 LBS | DIASTOLIC BLOOD PRESSURE: 88 MMHG | TEMPERATURE: 98 F | RESPIRATION RATE: 18 BRPM | BODY MASS INDEX: 26.37 KG/M2 | HEIGHT: 72 IN

## 2022-03-15 DIAGNOSIS — C80.1 SECONDARY SQUAMOUS CELL CARCINOMA OF HEAD AND NECK WITH UNKNOWN PRIMARY SITE: ICD-10-CM

## 2022-03-15 DIAGNOSIS — C79.89 SECONDARY SQUAMOUS CELL CARCINOMA OF HEAD AND NECK WITH UNKNOWN PRIMARY SITE: ICD-10-CM

## 2022-03-15 DIAGNOSIS — M25.9 SHOULDER JOINT DYSFUNCTION: Primary | ICD-10-CM

## 2022-03-15 DIAGNOSIS — C79.89 SECONDARY SQUAMOUS CELL CARCINOMA OF HEAD AND NECK WITH UNKNOWN PRIMARY SITE: Primary | ICD-10-CM

## 2022-03-15 DIAGNOSIS — Z72.0 TOBACCO ABUSE DISORDER: ICD-10-CM

## 2022-03-15 DIAGNOSIS — K12.1 ULCER OF SOFT PALATE: ICD-10-CM

## 2022-03-15 DIAGNOSIS — C80.1 SECONDARY SQUAMOUS CELL CARCINOMA OF HEAD AND NECK WITH UNKNOWN PRIMARY SITE: Primary | ICD-10-CM

## 2022-03-15 PROCEDURE — 4010F PR ACE/ARB THEARPY RXD/TAKEN: ICD-10-PCS | Mod: CPTII,S$GLB,, | Performed by: OTOLARYNGOLOGY

## 2022-03-15 PROCEDURE — 4010F ACE/ARB THERAPY RXD/TAKEN: CPT | Mod: CPTII,S$GLB,, | Performed by: OTOLARYNGOLOGY

## 2022-03-15 PROCEDURE — 1125F AMNT PAIN NOTED PAIN PRSNT: CPT | Mod: CPTII,S$GLB,, | Performed by: OTOLARYNGOLOGY

## 2022-03-15 PROCEDURE — 99999 PR PBB SHADOW E&M-EST. PATIENT-LVL III: CPT | Mod: PBBFAC,,, | Performed by: OTOLARYNGOLOGY

## 2022-03-15 PROCEDURE — 3288F PR FALLS RISK ASSESSMENT DOCUMENTED: ICD-10-PCS | Mod: CPTII,S$GLB,, | Performed by: OTOLARYNGOLOGY

## 2022-03-15 PROCEDURE — 1101F PR PT FALLS ASSESS DOC 0-1 FALLS W/OUT INJ PAST YR: ICD-10-PCS | Mod: CPTII,S$GLB,, | Performed by: OTOLARYNGOLOGY

## 2022-03-15 PROCEDURE — 3077F SYST BP >= 140 MM HG: CPT | Mod: CPTII,S$GLB,, | Performed by: OTOLARYNGOLOGY

## 2022-03-15 PROCEDURE — 3008F PR BODY MASS INDEX (BMI) DOCUMENTED: ICD-10-PCS | Mod: CPTII,S$GLB,, | Performed by: OTOLARYNGOLOGY

## 2022-03-15 PROCEDURE — 3077F PR MOST RECENT SYSTOLIC BLOOD PRESSURE >= 140 MM HG: ICD-10-PCS | Mod: CPTII,S$GLB,, | Performed by: OTOLARYNGOLOGY

## 2022-03-15 PROCEDURE — 1159F MED LIST DOCD IN RCRD: CPT | Mod: CPTII,S$GLB,, | Performed by: OTOLARYNGOLOGY

## 2022-03-15 PROCEDURE — 99999 PR PBB SHADOW E&M-EST. PATIENT-LVL III: ICD-10-PCS | Mod: PBBFAC,,, | Performed by: OTOLARYNGOLOGY

## 2022-03-15 PROCEDURE — 1101F PT FALLS ASSESS-DOCD LE1/YR: CPT | Mod: CPTII,S$GLB,, | Performed by: OTOLARYNGOLOGY

## 2022-03-15 PROCEDURE — 3079F DIAST BP 80-89 MM HG: CPT | Mod: CPTII,S$GLB,, | Performed by: OTOLARYNGOLOGY

## 2022-03-15 PROCEDURE — 99024 PR POST-OP FOLLOW-UP VISIT: ICD-10-PCS | Mod: S$GLB,,, | Performed by: OTOLARYNGOLOGY

## 2022-03-15 PROCEDURE — 1159F PR MEDICATION LIST DOCUMENTED IN MEDICAL RECORD: ICD-10-PCS | Mod: CPTII,S$GLB,, | Performed by: OTOLARYNGOLOGY

## 2022-03-15 PROCEDURE — 3008F BODY MASS INDEX DOCD: CPT | Mod: CPTII,S$GLB,, | Performed by: OTOLARYNGOLOGY

## 2022-03-15 PROCEDURE — 1125F PR PAIN SEVERITY QUANTIFIED, PAIN PRESENT: ICD-10-PCS | Mod: CPTII,S$GLB,, | Performed by: OTOLARYNGOLOGY

## 2022-03-15 PROCEDURE — 99024 POSTOP FOLLOW-UP VISIT: CPT | Mod: S$GLB,,, | Performed by: OTOLARYNGOLOGY

## 2022-03-15 PROCEDURE — 3288F FALL RISK ASSESSMENT DOCD: CPT | Mod: CPTII,S$GLB,, | Performed by: OTOLARYNGOLOGY

## 2022-03-15 PROCEDURE — 3079F PR MOST RECENT DIASTOLIC BLOOD PRESSURE 80-89 MM HG: ICD-10-PCS | Mod: CPTII,S$GLB,, | Performed by: OTOLARYNGOLOGY

## 2022-03-15 NOTE — PROGRESS NOTES
ONCOLOGY NUTRITION  INITIAL VISIT        Seven Freire is a 66 y.o. male.    DATE: 03/15/2022     Oncology Diagnosis: Head and Neck Cancer     REFERRAL FROM:   [] Integrative Oncology   [] Med/Heme Oncology  [] Radiation Oncology  [] Surgical Oncology     [x] Routine Nutrition follow up    TREATMENT PLAN:   [] Full treatment plan pending  [] Chemotherapy  [] Immunotherapy  [] Radiation  [] Concurrent  [x] Surgery  [] Treatment complete/post-treatment    PAST MEDICAL HISTORY:  Past Medical History:   Diagnosis Date    Cancer 01/2022    neck    Coronary artery disease     Depression     History of wheezing     Hypercholesteremia     Hypertension      Past Surgical History:   Procedure Laterality Date    BIOPSY OF PHARYNX N/A 1/28/2022    Procedure: BIOPSY, PHARYNX;  Surgeon: Hudson Seals MD;  Location: [facility];  Service: ENT;  Laterality: N/A;    CORONARY ANGIOPLASTY WITH STENT PLACEMENT  2015    X 4     DIRECT LARYNGOSCOPY N/A 1/28/2022    Procedure: LARYNGOSCOPY, DIRECT;  Surgeon: Hudson Seals MD;  Location: [facility];  Service: ENT;  Laterality: N/A;    ESOPHAGOSCOPY N/A 1/28/2022    Procedure: ESOPHAGOSCOPY;  Surgeon: Hudson Seals MD;  Location: [facility];  Service: ENT;  Laterality: N/A;    NASAL ENDOSCOPY N/A 1/28/2022    Procedure: ENDOSCOPY, NOSE;  Surgeon: Hudson Seals MD;  Location: [facility];  Service: ENT;  Laterality: N/A;    RADICAL NECK DISSECTION Right 3/2/2022    Procedure: DISSECTION, NECK, RADICAL - Modified Radical;  Surgeon: Juanita Sellers MD;  Location: [facility];  Service: ENT;  Laterality: Right;    TONSILLECTOMY, ADENOIDECTOMY Bilateral 1/28/2022    Procedure: TONSILLECTOMY AND ADENOIDECTOMY;  Surgeon: Hudson Seals MD;  Location: [facility];  Service: ENT;  Laterality: Bilateral;     Family History   Problem Relation Age of Onset    Diabetes Mother     Diabetes Brother      Social History     Tobacco Use    Smoking status: Current Every Day Smoker      Packs/day: 1.00     Types: Cigarettes    Smokeless tobacco: Never Used   Substance and Sexual Activity    Alcohol use: Yes     Alcohol/week: 10.0 standard drinks     Types: 10 Cans of beer per week    Drug use: Never    Sexual activity: Not on file     Review of patient's allergies indicates:   Allergen Reactions    Unasyn [ampicillin-sulbactam] Hallucinations       Review of patient's allergies indicates:   Allergen Reactions    Unasyn [ampicillin-sulbactam] Hallucinations       ANTHROPOMETRICS:  Wt Readings from Last 10 Encounters:   03/15/22 88.3 kg (194 lb 10.7 oz)   03/11/22 86.7 kg (191 lb 0.5 oz)   03/04/22 93.4 kg (206 lb)   02/28/22 88.5 kg (195 lb 1.7 oz)   02/15/22 91.2 kg (201 lb 1 oz)   01/28/22 91.4 kg (201 lb 8 oz)   02/20/19 81.2 kg (179 lb 0.2 oz)     Weight Changes: Weight has been fluctuating over the last 2 months- overall 3.4% decreased. Not considered significant at this time.     PHYSICAL EXAM:    Muscle Wasting Observed:  [x] No Deficit  [] Mild Deficit  [] Moderate  [] Severe    INTAKE:  [x] PO Intake [] TF Intake  Current Diet: soft diet  Dietary Patterns:  Patient mostly eating soft foods d/t s/p surgery from 3/2. Otherwise no restrictions.   [x] Oral nutritional supplements: Boost PRN    FOOD INSECURITY:   [x]Not discussed at this time  Patient is worried whether their food would run out before they got money to buy more.[] Often  [] Sometimes []Never  The food that they bought just didn't last and we didn't have money to get more.[] Often  [] Sometimes []Never    SYMPTOMS/COMPLAINTS:   [x] No nutritional concerns at current  [] Diarrhea                    [] Constipation           [] Nausea                 [] Vomiting                [] Indigestion                [] Reflux              [] Poor Appetite            [] Anorexia                 [] Early Satiety         [] Gas                       [] Bloating                     [] Dry Mouth    [] Mucositis                   [] Mouth  Sores           [] Poor Dentition      [] Difficulty chewing  [] Difficulty Swallowing   [] Pain with swallowing [] Change in taste      [] Change in smell   [] Pain (general)       [] Fatigue                      [] Sleep issues   [] Weight loss   [] other, please specify    Initial Nutrition Assessment Risk: Mild    MEDICATIONS:    Current Outpatient Medications:     albuterol (PROVENTIL/VENTOLIN HFA) 90 mcg/actuation inhaler, Inhale 1-2 puffs into the lungs every 6 (six) hours as needed for Wheezing or Shortness of Breath., Disp: , Rfl:     amLODIPine (NORVASC) 5 MG tablet, Take 5 mg by mouth once daily., Disp: , Rfl:     clopidogreL (PLAVIX) 75 mg tablet, Take 75 mg by mouth once daily., Disp: , Rfl:     DUEXIS 800-26.6 mg Tab, Take 1 tablet by mouth 3 (three) times daily., Disp: , Rfl:     ezetimibe (ZETIA) 10 mg tablet, Take 10 mg by mouth once daily., Disp: , Rfl:     HYDROmorphone (DILAUDID) 2 MG tablet, Take 1 tablet (2 mg total) by mouth every 4 (four) hours as needed for Pain., Disp: 28 tablet, Rfl: 0    irbesartan (AVAPRO) 300 MG tablet, Take 300 mg by mouth once daily., Disp: , Rfl:     LORazepam (ATIVAN) 1 MG tablet, Take 1 mg by mouth daily as needed for Anxiety., Disp: , Rfl:     traZODone (DESYREL) 100 MG tablet, Take 100-200 mg by mouth every evening., Disp: , Rfl:     varenicline (CHANTIX) 0.5 MG Tab, Take 0.5 mg by mouth 2 (two) times daily., Disp: , Rfl:     vitamin D (VITAMIN D3) 1000 units Tab, Take 1,000 Units by mouth once daily., Disp: , Rfl:     vitamin E 400 UNIT capsule, Take 400 Units by mouth once daily., Disp: , Rfl:      LABS:  WBC   Date Value Ref Range Status   03/04/2022 6.86 3.90 - 12.70 K/uL Final     RBC   Date Value Ref Range Status   03/04/2022 3.66 (L) 4.60 - 6.20 M/uL Final     Hemoglobin   Date Value Ref Range Status   03/04/2022 11.9 (L) 14.0 - 18.0 g/dL Final     Hematocrit   Date Value Ref Range Status   03/04/2022 37.8 (L) 40.0 - 54.0 % Final     Platelets    Date Value Ref Range Status   03/04/2022 152 150 - 450 K/uL Final     Glucose   Date Value Ref Range Status   03/07/2022 104 70 - 110 mg/dL Final     Comment:     The ADA recommends the following guidelines for fasting glucose:    Normal:       less than 100 mg/dL    Prediabetes:  100 mg/dL to 125 mg/dL    Diabetes:     126 mg/dL or higher       Sodium   Date Value Ref Range Status   03/07/2022 135 (L) 136 - 145 mmol/L Final     Potassium   Date Value Ref Range Status   03/07/2022 4.1 3.5 - 5.1 mmol/L Final     Magnesium   Date Value Ref Range Status   03/03/2022 1.8 1.6 - 2.6 mg/dL Final     Calcium   Date Value Ref Range Status   03/07/2022 8.5 8.4 - 10.2 mg/dL Final     Alkaline Phosphatase   Date Value Ref Range Status   03/04/2022 287 (H) 38 - 145 U/L Final     BUN   Date Value Ref Range Status   03/07/2022 22 (H) 9 - 21 mg/dL Final     Creatinine   Date Value Ref Range Status   03/07/2022 0.65 0.50 - 1.40 mg/dL Final     Total Bilirubin   Date Value Ref Range Status   03/04/2022 0.5 0.2 - 1.3 mg/dL Final     AST   Date Value Ref Range Status   03/04/2022 36 17 - 59 U/L Final     ALT   Date Value Ref Range Status   03/04/2022 31 0 - 50 U/L Final     Albumin   Date Value Ref Range Status   03/04/2022 3.3 (L) 3.5 - 5.2 g/dL Final   03/04/2022 3.3 (L) 3.5 - 5.2 g/dL Final     Total Protein   Date Value Ref Range Status   03/04/2022 6.7 6.0 - 8.4 g/dL Final     eGFR if    Date Value Ref Range Status   03/07/2022 >60 >60 mL/min/1.73 m^2 Final     eGFR if non    Date Value Ref Range Status   03/07/2022 >60 >60 mL/min/1.73 m^2 Final     Comment:     Calculation used to obtain the estimated glomerular filtration  rate (eGFR) is the CKD-EPI equation.          ESTIMATED ENERGY NEEDS:  Calories : 2472 kcals (28kcals/kg)  Protein : 124g (1.4g/kg)  Fluid: 2472 mL (1mL/kcal)    NUTRITION DIAGNOSIS (PES):   Problem: Increased energy/protein needs   Etiology (related to): Increased demands    Signs/Symptoms (as evidenced by): cancer dx and tx plan    EDUCATION PROVIDED:   [] No Education Needed at this time  [] New Patient Education (Food safety, common nutritional side effects regarding specific medication and treatment plan, importance of nutrition as it relates to cancer, weight maintenance, side effect/symptom management, adequate calories, protein and hydration importance)  [] Diarrhea                                              [] Constipation                          [] Nausea/Vomiting  [] Mucositis                [] Dry Mouth    [] Dealing with changes in Taste/Smell  [] Dealing with Poor Appetite   [x] Soft/moist Diet      [] Weight Loss/Gain     [x] Weight Maintenance                           [] Indigestion/GERD                 [] Gas/Bloating          [] Foods High/ Low in specific nutrients [] Increasing Calories/Protein   [] Milkshake/Smoothies Recipes   [x] Nutrition Supplements                        [] Increasing Fluid Intakes         [] Foods that fight cancer    [] Evidence bases resources                 [] Fermented Foods/Probiotics  [] Mediterranean/Plant Based Diet     [] Other, specify                                 [] Handouts provided: Nutrition Guidance for Head and Neck Cancer      [] Samples provided     RD NOTE:  RD met with patient and spouse, Nohemi, in the clinic following his appt with Dr. Sellers. Pt recently underwent surgery and is going to be starting concurrent chemo/XRT at [facility] in the coming weeks. RD explained role in POC, discussed different soft foods that are high in protein, and importance of weight maintenance. Encouraged pt to reach out to RD as different nutrition related side effects arise. Pt states he does have Boost at home- he does not drink it everyday but has it if he needs it. Contact information provided.     RD Goals:   [x] Weight stable                  [] Weight gain                      [] Weight Loss                               [x] Continue  adequate Kcal/protein   [] Increase Kcal/protein      [] Adjust Tube-feeding Rx   [] Tolerate Tube Feedings             [] Increase tube feedings to goal     [] Tolerate Supplements     [] Symptom Improvement   [x] Understand nutrition Education  [] Offer supportive visits   [] other, please specify      RECOMMENDATIONS:  1. Consume adequate caloric intake to help promote weight maintenance.   2. Consume adequate fluid to help maintain proper hydration     Follow up: Throughout tx PRN    Bernadette Hector, MS, RD, LDN  03/15/2022  3:22 PM

## 2022-03-16 ENCOUNTER — TELEPHONE (OUTPATIENT)
Dept: HEMATOLOGY/ONCOLOGY | Facility: CLINIC | Age: 67
End: 2022-03-16
Payer: MEDICARE

## 2022-03-16 NOTE — TELEPHONE ENCOUNTER
Pt to come in to see Dr. Sellers tomorrow at 8 am for evaluation of swelling to his neck.  Verbalized agreement with plan.

## 2022-03-16 NOTE — TELEPHONE ENCOUNTER
Pt reports that the fluid that was aspirated in clinic by Dr. Sellers (seroma) is returning to his neck.  Denies difficulty breathing, swallowing, eating or drinking.  Instructed to report to ER for any of those symptoms.  Will notify Dr. Sellers and follow up with pt.  Pt verbalized agreement with plan.

## 2022-03-16 NOTE — TELEPHONE ENCOUNTER
----- Message from Lacy Moore sent at 3/16/2022  2:20 PM CDT -----  Regarding: paitent state fluid is puffing up in neck  Type: Needs Medical Advice  Who Called:  patient wife Nohemi  Symptoms (please be specific):  fluid in neck is puffing up again   How long has patient had these symptoms:  yesterday  Best Call Back Number: [phone]  Additional Information: patient request call back regarding fluid has return need advise.

## 2022-03-17 ENCOUNTER — OFFICE VISIT (OUTPATIENT)
Dept: HEMATOLOGY/ONCOLOGY | Facility: CLINIC | Age: 67
End: 2022-03-17
Payer: MEDICARE

## 2022-03-17 ENCOUNTER — TELEPHONE (OUTPATIENT)
Dept: HEMATOLOGY/ONCOLOGY | Facility: CLINIC | Age: 67
End: 2022-03-17
Payer: MEDICARE

## 2022-03-17 DIAGNOSIS — K12.1 ULCER OF SOFT PALATE: Primary | ICD-10-CM

## 2022-03-17 DIAGNOSIS — C79.89 SECONDARY SQUAMOUS CELL CARCINOMA OF HEAD AND NECK WITH UNKNOWN PRIMARY SITE: ICD-10-CM

## 2022-03-17 DIAGNOSIS — C80.1 SECONDARY SQUAMOUS CELL CARCINOMA OF HEAD AND NECK WITH UNKNOWN PRIMARY SITE: ICD-10-CM

## 2022-03-17 PROCEDURE — 99024 PR POST-OP FOLLOW-UP VISIT: ICD-10-PCS | Mod: S$GLB,,, | Performed by: OTOLARYNGOLOGY

## 2022-03-17 PROCEDURE — 4010F PR ACE/ARB THEARPY RXD/TAKEN: ICD-10-PCS | Mod: CPTII,S$GLB,, | Performed by: OTOLARYNGOLOGY

## 2022-03-17 PROCEDURE — 4010F ACE/ARB THERAPY RXD/TAKEN: CPT | Mod: CPTII,S$GLB,, | Performed by: OTOLARYNGOLOGY

## 2022-03-17 PROCEDURE — 99024 POSTOP FOLLOW-UP VISIT: CPT | Mod: S$GLB,,, | Performed by: OTOLARYNGOLOGY

## 2022-03-17 NOTE — TELEPHONE ENCOUNTER
Spoke with Tani about getting ST, PT, and IO appts scheduled. He said she is so overwhelmed with appts right now and planning rxt that it is a lot right now ans asked if we could wait. I told him it is very important to get these appts scheduled, even if they for a few weeks away because we are trying to make sure he can heal properly. He asked me to call him back in a week and see how he is feeling after these few more appts are over with. I told him I would let Dr. Sellers know.

## 2022-03-17 NOTE — PROGRESS NOTES
Date of Encounter: 2/15/2022  Provider: Juanita Sellers MD  Referring MD: Hudson Seals MD  PCP: Ewa Jaramillo MD  Rad Onc: Christian Wallace MD  Med Onc:    CC:  Unknown primary of the head neck Kimberlee juares and Christian Wallace, [id number] SCCA    HPI:    Patient is a 66-year-old male who presented for evaluation and treatment of neck disease of an unknown primary of the head neck by Donya Seals and Christian Wallace.  HPV and EBV status unknown at this time due to lack of pathologic material.  NCCN guidelines recommend up front neck dissection followed with radiation therapy +/- chemo pending final path as per Dr Wallace's note.  On presentation today he denies neck pain, otalgia, dysphagia, odynophagia.    Patient reports that he bled heavily following tonsillectomy.  He presented to a local urgent care.  Patient was told that he had an infection it was prescribed Augmentin.  He has been very weak following that episode and due to low blood pressure he has stopped his antihypertensives, Plavix and aspirin.  He did not consult with his physician 1st.    Patient also has a history of tobacco abuse and continues to smoke a pack of cigarettes a day.  He is not interested in tobacco cessation at this time.  He also has a history of depression for which he was taken Wellbutrin.  He also discontinued this as he thought it was not helping him    3/15/2022  Patient is here today for post op visit. Staples were removed last week.  He has no complaints.  He was seen by Dr. Wallace last Friday who noted an also on his right soft palate.  Patient reports that this is painful only when it is touched.  Patient also noted neck swelling starting about 2 days ago.    Patient continues to using nicotine patch and has not smoke since surgery.    3/17/2022  Here today because seroma has reaccumulated  No symptoms    ROS: see HPI  Constitutional: Negative for activity change and appetite change, weight loss.   Eyes:  "Negative for discharge, visual changes.   Respiratory: Negative for difficulty breathing and wheezing   Cardiovascular: Negative for chest pain.   Gastrointestinal: Negative for abdominal distention and abdominal pain.   Endocrine: Negative for cold intolerance and heat intolerance.   Genitourinary: Negative for dysuria.   Musculoskeletal: Negative for gait problem, muscle pain and joint swelling.   Skin: Negative for color change and pallor; negative for skin lesions.   Neurological: Negative for syncope and weakness; no numbness face.   Psychiatric/Behavioral: Negative for agitation and confusion; negative for depression.    Physical Exam:   OC/OP:   Right soft palate ulcer unchanged  Neck  · Inspection and Palpation: no erythema, induration, emphysema, incision intact; small amount of fluid anterior neck-fluctuant; lymphedema submental area       Path:    1. LYMPH NODE, EXTERNAL JUGULAR VEIN, EXCISION:       --ONE LYMPH NODE WITH NO MORPHOLOGIC EVIDENCE OF MALIGNANCY (0/1).     2. RIGHT NECK, FURTHER DESIGNATED "LEVELS 2A, 2B, 3, PLUS SCM, INTERNAL    JUGULAR VEIN," DISSECTION:   --FOUR OUT OF NINETEEN LYMPH NODES ARE POSITIVE FOR METASTATIC    KERATINIZING SQUAMOUS CELL         CARCINOMA (4/19).   --LARGEST PART 2 SPECIMEN METASTATIC DEPOSIT: APPROXIMATELY 31    MILLIMETERS.       --EXTRANODAL EXTENSION:  PRESENT.       --NO PERINEURAL INVASION IDENTIFIED.       --SKELETAL MUSCLE INVOLVED BY METASTATIC SQUAMOUS CELL CARCINOMA.   --PD-L1 STUDY AND EBV IMMUNOHISTOCHEMISTRY ARE PENDING AND WILL BE    REPORTED IN ADDENDA.     3. RIGHT NECK, FURTHER DESIGNATED "LEVELS 4 AND 5," DISSECTION:   --ONE OUT OF NINETEEN LYMPH NODES IS POSITIVE FOR METASTATIC    KERATINIZING SQUAMOUS CELL CARCINOMA         (1/19).   --LARGEST PART 3 SPECIMEN METASTATIC DEPOSIT: APPROXIMATELY 8    MILLIMETERS.       --EXTRANODAL EXTENSION:  PRESENT.       --NO PERINEURAL INVASION IDENTIFIED.       --SKELETAL MUSCLE WITH NO MORPHOLOGIC " EVIDENCE OF MALIGNANCY.     Human Papillomavirus (HPV) Testing:   p16 Immunohistochemistry (IHC) as a Surrogate for Transcriptionally    Active High-Risk HPV: NEGATIVE (no staining identified).   EBV negative  PDL 1 positive (85)       Assessment:   Unknown primary of the head and neck status post right modified radical neck dissection and pectoralis major Levar fascial flap--P 16 negative; PDL1 positive  Postoperative seroma right neck      Plan:  45 cc of says serous fluid aspirated from right neck consistent with seroma  F/U Monday

## 2022-03-18 ENCOUNTER — TELEPHONE (OUTPATIENT)
Dept: HEMATOLOGY/ONCOLOGY | Facility: CLINIC | Age: 67
End: 2022-03-18
Payer: MEDICARE

## 2022-03-18 DIAGNOSIS — M25.9 DISORDER OF SHOULDER JOINT: Primary | ICD-10-CM

## 2022-03-18 NOTE — TELEPHONE ENCOUNTER
Pt s/p seroma evacuation by Dr. Sellers yesterday.  Called to check on pt status.  Spoke with wife, states that pt is doing well and has no further swelling in neck.  Reviewed all future appointments with pt's wife.  Wife denies further questions/concerns.

## 2022-03-22 ENCOUNTER — OFFICE VISIT (OUTPATIENT)
Dept: HEMATOLOGY/ONCOLOGY | Facility: CLINIC | Age: 67
End: 2022-03-22
Payer: MEDICARE

## 2022-03-22 VITALS
HEART RATE: 88 BPM | OXYGEN SATURATION: 96 % | SYSTOLIC BLOOD PRESSURE: 126 MMHG | BODY MASS INDEX: 26.1 KG/M2 | TEMPERATURE: 98 F | WEIGHT: 192.44 LBS | RESPIRATION RATE: 14 BRPM | DIASTOLIC BLOOD PRESSURE: 78 MMHG

## 2022-03-22 DIAGNOSIS — C80.1 SECONDARY SQUAMOUS CELL CARCINOMA OF HEAD AND NECK WITH UNKNOWN PRIMARY SITE: ICD-10-CM

## 2022-03-22 DIAGNOSIS — C79.89 SECONDARY SQUAMOUS CELL CARCINOMA OF HEAD AND NECK WITH UNKNOWN PRIMARY SITE: ICD-10-CM

## 2022-03-22 DIAGNOSIS — K12.1 ULCER OF SOFT PALATE: Primary | ICD-10-CM

## 2022-03-22 PROCEDURE — 3074F PR MOST RECENT SYSTOLIC BLOOD PRESSURE < 130 MM HG: ICD-10-PCS | Mod: CPTII,S$GLB,, | Performed by: OTOLARYNGOLOGY

## 2022-03-22 PROCEDURE — 99024 POSTOP FOLLOW-UP VISIT: CPT | Mod: S$GLB,,, | Performed by: OTOLARYNGOLOGY

## 2022-03-22 PROCEDURE — 1101F PR PT FALLS ASSESS DOC 0-1 FALLS W/OUT INJ PAST YR: ICD-10-PCS | Mod: CPTII,S$GLB,, | Performed by: OTOLARYNGOLOGY

## 2022-03-22 PROCEDURE — 3078F PR MOST RECENT DIASTOLIC BLOOD PRESSURE < 80 MM HG: ICD-10-PCS | Mod: CPTII,S$GLB,, | Performed by: OTOLARYNGOLOGY

## 2022-03-22 PROCEDURE — 3288F PR FALLS RISK ASSESSMENT DOCUMENTED: ICD-10-PCS | Mod: CPTII,S$GLB,, | Performed by: OTOLARYNGOLOGY

## 2022-03-22 PROCEDURE — 99999 PR PBB SHADOW E&M-EST. PATIENT-LVL III: CPT | Mod: PBBFAC,,, | Performed by: OTOLARYNGOLOGY

## 2022-03-22 PROCEDURE — 1125F AMNT PAIN NOTED PAIN PRSNT: CPT | Mod: CPTII,S$GLB,, | Performed by: OTOLARYNGOLOGY

## 2022-03-22 PROCEDURE — 4010F ACE/ARB THERAPY RXD/TAKEN: CPT | Mod: CPTII,S$GLB,, | Performed by: OTOLARYNGOLOGY

## 2022-03-22 PROCEDURE — 3074F SYST BP LT 130 MM HG: CPT | Mod: CPTII,S$GLB,, | Performed by: OTOLARYNGOLOGY

## 2022-03-22 PROCEDURE — 3288F FALL RISK ASSESSMENT DOCD: CPT | Mod: CPTII,S$GLB,, | Performed by: OTOLARYNGOLOGY

## 2022-03-22 PROCEDURE — 4010F PR ACE/ARB THEARPY RXD/TAKEN: ICD-10-PCS | Mod: CPTII,S$GLB,, | Performed by: OTOLARYNGOLOGY

## 2022-03-22 PROCEDURE — 1159F PR MEDICATION LIST DOCUMENTED IN MEDICAL RECORD: ICD-10-PCS | Mod: CPTII,S$GLB,, | Performed by: OTOLARYNGOLOGY

## 2022-03-22 PROCEDURE — 1125F PR PAIN SEVERITY QUANTIFIED, PAIN PRESENT: ICD-10-PCS | Mod: CPTII,S$GLB,, | Performed by: OTOLARYNGOLOGY

## 2022-03-22 PROCEDURE — 1101F PT FALLS ASSESS-DOCD LE1/YR: CPT | Mod: CPTII,S$GLB,, | Performed by: OTOLARYNGOLOGY

## 2022-03-22 PROCEDURE — 99999 PR PBB SHADOW E&M-EST. PATIENT-LVL III: ICD-10-PCS | Mod: PBBFAC,,, | Performed by: OTOLARYNGOLOGY

## 2022-03-22 PROCEDURE — 3008F BODY MASS INDEX DOCD: CPT | Mod: CPTII,S$GLB,, | Performed by: OTOLARYNGOLOGY

## 2022-03-22 PROCEDURE — 99024 PR POST-OP FOLLOW-UP VISIT: ICD-10-PCS | Mod: S$GLB,,, | Performed by: OTOLARYNGOLOGY

## 2022-03-22 PROCEDURE — 3078F DIAST BP <80 MM HG: CPT | Mod: CPTII,S$GLB,, | Performed by: OTOLARYNGOLOGY

## 2022-03-22 PROCEDURE — 3008F PR BODY MASS INDEX (BMI) DOCUMENTED: ICD-10-PCS | Mod: CPTII,S$GLB,, | Performed by: OTOLARYNGOLOGY

## 2022-03-22 PROCEDURE — 1159F MED LIST DOCD IN RCRD: CPT | Mod: CPTII,S$GLB,, | Performed by: OTOLARYNGOLOGY

## 2022-03-22 NOTE — PROGRESS NOTES
Date of Encounter: 2/15/2022  Provider: Juanita Sellers MD  Referring MD: Hudson Seals MD  PCP: Ewa Jaramillo MD  Rad Onc: Christian Wallace MD  Med Onc:    CC:  Unknown primary of the head neck Kimberlee molina Christian Wallace, [id number] SCCA    HPI:    Patient is a 66-year-old male who presented for evaluation and treatment of neck disease of an unknown primary of the head neck by Donya Seals and Christian Wallace.  HPV and EBV status unknown at this time due to lack of pathologic material.  NCCN guidelines recommend up front neck dissection followed with radiation therapy +/- chemo pending final path as per Dr Wallace's note.  On presentation today he denies neck pain, otalgia, dysphagia, odynophagia.    Patient reports that he bled heavily following tonsillectomy.  He presented to a local urgent care.  Patient was told that he had an infection it was prescribed Augmentin.  He has been very weak following that episode and due to low blood pressure he has stopped his antihypertensives, Plavix and aspirin.  He did not consult with his physician 1st.    Patient also has a history of tobacco abuse and continues to smoke a pack of cigarettes a day.  He is not interested in tobacco cessation at this time.  He also has a history of depression for which he was taken Wellbutrin.  He also discontinued this as he thought it was not helping him    3/15/2022  Patient is here today for post op visit. Staples were removed last week.  He has no complaints.  He was seen by Dr. Wallace last Friday who noted an also on his right soft palate.  Patient reports that this is painful only when it is touched.  Patient also noted neck swelling starting about 2 days ago.    Patient continues to using nicotine patch and has not smoke since surgery.    3/17/2022  Here today because seroma has reaccumulated  No symptoms    03/22/2022  Patient is here today for follow-up for re-evaluation of his neck.  He presented with postop  "seroma.  He also presented with the also involving his right soft palate postop which could be due to trauma versus a small carcinoma.  Patient reports that the seroma has reacumulated with a small amount.  He also had PT scheduled today but it was cancelled due to weather.    ROS: see HPI  Constitutional: Negative for activity change and appetite change, weight loss.   Eyes: Negative for discharge, visual changes.   Respiratory: Negative for difficulty breathing and wheezing   Cardiovascular: Negative for chest pain.   Gastrointestinal: Negative for abdominal distention and abdominal pain.   Endocrine: Negative for cold intolerance and heat intolerance.   Genitourinary: Negative for dysuria.   Musculoskeletal: Negative for gait problem, muscle pain and joint swelling.   Skin: Negative for color change and pallor; negative for skin lesions.   Neurological: Negative for syncope and weakness; no numbness face.   Psychiatric/Behavioral: Negative for agitation and confusion; negative for depression.    Physical Exam:   OC/OP:   Right soft palate ulcer -healed  Neck  · Inspection and Palpation: no erythema, induration, emphysema, incision intact; small amount of fluid anterior neck-fluctuant; lymphedema submental area       Path:    1. LYMPH NODE, EXTERNAL JUGULAR VEIN, EXCISION:       --ONE LYMPH NODE WITH NO MORPHOLOGIC EVIDENCE OF MALIGNANCY (0/1).     2. RIGHT NECK, FURTHER DESIGNATED "LEVELS 2A, 2B, 3, PLUS SCM, INTERNAL    JUGULAR VEIN," DISSECTION:   --FOUR OUT OF NINETEEN LYMPH NODES ARE POSITIVE FOR METASTATIC    KERATINIZING SQUAMOUS CELL         CARCINOMA (4/19).   --LARGEST PART 2 SPECIMEN METASTATIC DEPOSIT: APPROXIMATELY 31    MILLIMETERS.       --EXTRANODAL EXTENSION:  PRESENT.       --NO PERINEURAL INVASION IDENTIFIED.       --SKELETAL MUSCLE INVOLVED BY METASTATIC SQUAMOUS CELL CARCINOMA.   --PD-L1 STUDY AND EBV IMMUNOHISTOCHEMISTRY ARE PENDING AND WILL BE    REPORTED IN ADDENDA.     3. RIGHT NECK, " "FURTHER DESIGNATED "LEVELS 4 AND 5," DISSECTION:   --ONE OUT OF NINETEEN LYMPH NODES IS POSITIVE FOR METASTATIC    KERATINIZING SQUAMOUS CELL CARCINOMA         (1/19).   --LARGEST PART 3 SPECIMEN METASTATIC DEPOSIT: APPROXIMATELY 8    MILLIMETERS.       --EXTRANODAL EXTENSION:  PRESENT.       --NO PERINEURAL INVASION IDENTIFIED.       --SKELETAL MUSCLE WITH NO MORPHOLOGIC EVIDENCE OF MALIGNANCY.     Human Papillomavirus (HPV) Testing:   p16 Immunohistochemistry (IHC) as a Surrogate for Transcriptionally    Active High-Risk HPV: NEGATIVE (no staining identified).   EBV negative  PDL 1 positive (85)       Assessment:   Unknown primary of the head and neck status post right modified radical neck dissection and pectoralis major Levar fascial flap--P 16 negative; PDL1 positive  Palate ulcer resolved  Postoperative seroma right neck      Plan:  40 cc of says serous fluid aspirated from right neck consistent with seroma  F/U 6 weeks  If seroma reaccumulates again it should resolve on its own  "

## 2022-03-23 ENCOUNTER — TELEPHONE (OUTPATIENT)
Dept: HEMATOLOGY/ONCOLOGY | Facility: CLINIC | Age: 67
End: 2022-03-23
Payer: MEDICARE

## 2022-03-24 ENCOUNTER — TELEPHONE (OUTPATIENT)
Dept: HEMATOLOGY/ONCOLOGY | Facility: CLINIC | Age: 67
End: 2022-03-24
Payer: MEDICARE

## 2022-03-30 ENCOUNTER — TELEPHONE (OUTPATIENT)
Dept: HEMATOLOGY/ONCOLOGY | Facility: CLINIC | Age: 67
End: 2022-03-30
Payer: MEDICARE

## 2022-03-30 NOTE — TELEPHONE ENCOUNTER
Spoke with Nohemi, Seven' wife about getting appts scheduled for Seven. I explained IO and what we offer to help manage side effects. She said MBP already talked about a lot of stuff and they have to much going on. I told her if he changes his mind to let us know! I also told her I would ask to have his PT r/s and they will call them. She voiced acceptance.

## 2022-05-12 ENCOUNTER — DOCUMENTATION ONLY (OUTPATIENT)
Dept: ADMINISTRATIVE | Facility: OTHER | Age: 67
End: 2022-05-12
Payer: MEDICARE

## 2022-05-13 NOTE — PROGRESS NOTES
2022      Hudson Seals M.D.   [address] [facility] [place], operated [facility] [place] [address]      RE: Seven Freire    MR#:  [id number]    :  1955       DX: 1. 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  Right modified radical neck dissection in 2022.    Unknown head and neck primary.   Group pathologic stage IVB, cT0  pN3b (5/39 right cervical lymph nodes were positive including  major extracapsular extension to the sternocleidomastoid muscle, internal jugular vein, adventitia of the common carotid artery, and the right pharyngeal constrictors muscles;  largest lymph node was 3.1 cm)  cM0.   PD-L1 positive      Dear Hudson:     Tomorrow, your patient will complete postoperative irradiation delivered with weekly cisplatin.  This note will summarize his treatment.     Irradiation was started on  and will finish tomorrow, May 13.  Radiation treatment technique employed intensity-modulated 6 MV photons using the 2-arc VMAT technique.  Daily image guidance was accomplished using daily cone beam CTs.  Using this technique, the highest-risk area of the right neck received 60 Gy delivered at 2 Gy per fraction.  Lower-risk areas of the right and left neck retropharyngeal node region, as well as the entire pharynx, including the hypopharynx and larynx received 54 Gy at 1.8 Gy per fraction.     The patient tolerated the treatment reasonably well all things considered.  We did eventually get him to stop smoking, although he switched from smoking to vaping and we strongly encouraged him to stop vaping.  From the very first week of treatment, the patient was requesting narcotic pain medications, which I did not fill.  As you may remember, the patient was on narcotic pain medicines for back pain for many years, although he was taken off all pain medicines a number of months before starting the radiation.  At 40 Gy, I saw the patient and he said  he had bit into a sharp piece of meat bone and it hurt his left mandible.  Physical examination showed a small possible area of bone exposure. The patient did not want to see the oncologic dentist.  When I spoke with the patient next week, he said this was healing up on its own.  I saw the patient shortly before finishing irradiation, and he was doing well and holding his weight.  He had the expected skin reaction.  I have asked to see the patient in one month.     I appreciate the opportunity to consult on your patients.  Please call me with any questions or comments.     Sincerely,           Electronically Signed By:  MARISA Uriarte/Gabe  DD:  05/12/2022  DT:  05/12/2022  Job #:  [id number]    CC:  Ewa Jaramillo M.D., [address] [place] [address], Fax: [phone]        Hudson Seals M.D., [address] [place] [address], Fax: [phone]        Kalpesh Haddad M.D., [address] [place] [address], Fax: [phone]        Juanita Sellers M.D., [address], [place] [address], Fax: [phone]

## 2022-05-24 NOTE — PROGRESS NOTES
Date of Encounter: 2/15/2022  Provider: Juanita Sellers MD  Referring MD: Hudson Seals MD  PCP: Ewa Jaramillo MD  Rad Onc: Christian Wallace MD  Med Onc:    CC:  Unknown primary of the head neck by Aguila Seals and Christian Wallace, [id number] SCCA    HPI:    Patient is a 66-year-old male who presented for evaluation and treatment of neck disease of an unknown primary of the head neck by Donya Seals and Christian Wallace.  HPV and EBV status unknown at this time due to lack of pathologic material.  NCCN guidelines recommend up front neck dissection followed with radiation therapy +/- chemo pending final path as per Dr Wallace's note.  On presentation today he denies neck pain, otalgia, dysphagia, odynophagia.    Patient reports that he bled heavily following tonsillectomy.  He presented to a local urgent care.  Patient was told that he had an infection it was prescribed Augmentin.  He has been very weak following that episode and due to low blood pressure he has stopped his antihypertensives, Plavix and aspirin.  He did not consult with his physician 1st.    Patient also has a history of tobacco abuse and continues to smoke a pack of cigarettes a day.  He is not interested in tobacco cessation at this time.  He also has a history of depression for which he was taken Wellbutrin.  He also discontinued this as he thought it was not helping him    3/15/2022  Patient is here today for post op visit. Staples were removed last week.  He has no complaints.  He was seen by Dr. Wallace last Friday who noted an also on his right soft palate.  Patient reports that this is painful only when it is touched.  Patient also noted neck swelling starting about 2 days ago.    Patient continues to using nicotine patch and has not smoke since surgery.    3/17/2022  Here today because seroma has reaccumulated  No symptoms    03/22/2022  Patient is here today for follow-up for re-evaluation of his neck.  He presented with postop  seroma.  He also presented with the also involving his right soft palate postop which could be due to trauma versus a small carcinoma.  Patient reports that the seroma has reacumulated with a small amount.  He also had PT scheduled today but it was cancelled due to weather.    5/26/2022  Patient is here today for follow-up.  He is status post an extended right modified radical neck dissection were unknown primary.  Postoperatively his course was complicated with a seroma which was aspirated in clinic.  He is status post completion of chemoradiation 5/13/2022  Patient overall tolerated treatment well except for extreme fatigue.  He canceled his physical and occupational therapy appointments due to feeling exhausted. He was treated with Ritalin by Dr Haddad which gave him nightmares.  He stopped this medication.  He is tolerating a regular diet.  He has lost 15 lb since started treatment but his weight is now stabilized.    ROS: see HPI  Constitutional: Negative for activity change and appetite change, weight loss.   Eyes: Negative for discharge, visual changes.   Respiratory: Negative for difficulty breathing and wheezing   Cardiovascular: Negative for chest pain.   Gastrointestinal: Negative for abdominal distention and abdominal pain.   Endocrine: Negative for cold intolerance and heat intolerance.   Genitourinary: Negative for dysuria.   Musculoskeletal: Negative for gait problem, muscle pain and joint swelling.   Skin: Negative for color change and pallor; negative for skin lesions.   Neurological: Negative for syncope and weakness; no numbness face.   Psychiatric/Behavioral: Negative for agitation and confusion; negative for depression.    Physical Exam:  Physical Exam:      Constitutional  · General Appearance: well nourished, well-developed, alert, oriented, in no acute distress  · Communication: ability, understanding, normal  Head and Face  · Inspection: normocephalic, atraumatic, no scars, lesions or masses  "  · Palpation: no stepoffs, sinus tenderness or masses  · Parotid glands: no masses, stones, swelling or tenderness  Eyes  · Ocular Motility / Alignment: normal alignment, motility, no proptosis, enophthalmus or nystagmus  · Conjunctiva: not injected  · Eyelids: no hooding, lag, entropion, or ectropion  Nose  · External Nose: no lesions, tenderness, trauma or deformity  · Intranasal Exam: no edema, erythema, discharge, mass or obstruction  Oral Cavity / Oropharynx  · Lips: upper and lower lips pink and moist  · Teeth: good dentition  · Gingiva: healthy  · Oral Mucosa: thick white saliva; no mucosal lesions  · Floor of Mouth: normal, no lesions, salivary ducts patent  · Tongue: moist, normal mobility, no lesions  · Palate: soft and hard palates without lesions or ulcers  Oropharynx: tonsils absent; no lesions  Nasopharynx, Hypopharynx, and Larynx  Indirect:          Thick mucous; post larynx normal  Neck  · Inspection and Palpation: dry skin right lower neck; right sided hockey incision healed; mild erythema; pedicle to PMMF palpated and grossly seen overlying lateral clavicle; moderate submental edema  · Larynx and Trachea: normal position; normal crepitus  · Thyroid: no tenderness, enlargement or nodules  · Submandibular Glands: no masses or tenderness  Lymphatic:  · Anterior, Posterior, Submandibular, Submental, Supraclavicular: no masses  ·   Neurological  · Cranial Nerves: 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 LYMPH NODE, EXTERNAL JUGULAR VEIN, EXCISION:       --ONE LYMPH NODE WITH NO MORPHOLOGIC EVIDENCE OF MALIGNANCY (0/1).     2. RIGHT NECK, FURTHER DESIGNATED "LEVELS 2A, 2B, 3, PLUS SCM, INTERNAL    JUGULAR VEIN," DISSECTION:   --FOUR OUT OF NINETEEN LYMPH " "NODES ARE POSITIVE FOR METASTATIC    KERATINIZING SQUAMOUS CELL         CARCINOMA (4/19).   --LARGEST PART 2 SPECIMEN METASTATIC DEPOSIT: APPROXIMATELY 31    MILLIMETERS.       --EXTRANODAL EXTENSION:  PRESENT.       --NO PERINEURAL INVASION IDENTIFIED.       --SKELETAL MUSCLE INVOLVED BY METASTATIC SQUAMOUS CELL CARCINOMA.   --PD-L1 STUDY AND EBV IMMUNOHISTOCHEMISTRY ARE PENDING AND WILL BE    REPORTED IN ADDENDA.     3. RIGHT NECK, FURTHER DESIGNATED "LEVELS 4 AND 5," DISSECTION:   --ONE OUT OF NINETEEN LYMPH NODES IS POSITIVE FOR METASTATIC    KERATINIZING SQUAMOUS CELL CARCINOMA         (1/19).   --LARGEST PART 3 SPECIMEN METASTATIC DEPOSIT: APPROXIMATELY 8    MILLIMETERS.       --EXTRANODAL EXTENSION:  PRESENT.       --NO PERINEURAL INVASION IDENTIFIED.       --SKELETAL MUSCLE WITH NO MORPHOLOGIC EVIDENCE OF MALIGNANCY.     Human Papillomavirus (HPV) Testing:   p16 Immunohistochemistry (IHC) as a Surrogate for Transcriptionally    Active High-Risk HPV: NEGATIVE (no staining identified).   EBV negative  PDL 1 positive (85)      Records reviewed: Dr Wallace 5/12/2022  Irradiation was  started on April 4 and will finish tomorrow, May 13.  Radiation treatment technique employed intensity-modulated 6 MV photons using the 2-arc VMAT technique.  Daily image guidance was accomplished using daily cone beam CTs.  Using this technique, the highest-risk area of the right neck received 60 Gy delivered at 2 Gy per fraction.  Lower-risk areas of the right and left neck retropharyngeal node region, as well as the entire pharynx, including the hypopharynx and larynx received 54 Gy at 1.8 Gy per fraction.      The patient tolerated the treatment reasonably well all things considered.  We did eventually get him to stop smoking, although he switched from smoking to vaping and we strongly encouraged him to stop vaping.  From the very first week of treatment, the patient was requesting narcotic pain medications, which I did not " fill.  As you may remember, the patient was on narcotic pain medicines for back pain for many years, although he was taken off all pain medicines a number of months before starting the radiation.  At 40 Gy, I saw the patient and he said he had bit into a sharp piece of meat bone and it hurt his left mandible.  Physical examination showed a small possible area of bone exposure. The patient did not want to see the oncologic dentist.  When I spoke with the patient next week, he said this was healing up on its own.  I saw the patient shortly before finishing irradiation, and he was doing well and holding his weight.  He had the expected skin reaction.  I have asked to see the patient in one month.        Assessment:   Unknown primary of the head and neck status post right modified radical neck dissection and pectoralis major Levar fascial flap--P 16 negative; PDL1 positive  Patient tolerated adjuvant therapy pretty well overall.  He is tolerating a regular diet and his weight is starting to stabilize  His main complaint is continued fatigue.    Plan:  Follow-up in 6 weeks  I will contact Aguila Haddad and Rodrigo regarding pharmocologic therapy if it exists for fatigue

## 2022-05-26 ENCOUNTER — OFFICE VISIT (OUTPATIENT)
Dept: HEMATOLOGY/ONCOLOGY | Facility: CLINIC | Age: 67
End: 2022-05-26
Payer: MEDICARE

## 2022-05-26 VITALS
DIASTOLIC BLOOD PRESSURE: 85 MMHG | WEIGHT: 179.25 LBS | SYSTOLIC BLOOD PRESSURE: 138 MMHG | HEART RATE: 78 BPM | HEIGHT: 72 IN | BODY MASS INDEX: 24.28 KG/M2 | TEMPERATURE: 97 F | OXYGEN SATURATION: 98 % | RESPIRATION RATE: 18 BRPM

## 2022-05-26 DIAGNOSIS — R53.0 NEOPLASTIC MALIGNANT RELATED FATIGUE: ICD-10-CM

## 2022-05-26 DIAGNOSIS — C80.1 SECONDARY SQUAMOUS CELL CARCINOMA OF HEAD AND NECK WITH UNKNOWN PRIMARY SITE: Primary | ICD-10-CM

## 2022-05-26 DIAGNOSIS — R63.4 WEIGHT LOSS: ICD-10-CM

## 2022-05-26 DIAGNOSIS — C79.89 SECONDARY SQUAMOUS CELL CARCINOMA OF HEAD AND NECK WITH UNKNOWN PRIMARY SITE: Primary | ICD-10-CM

## 2022-05-26 PROCEDURE — 99999 PR PBB SHADOW E&M-EST. PATIENT-LVL IV: ICD-10-PCS | Mod: PBBFAC,,, | Performed by: OTOLARYNGOLOGY

## 2022-05-26 PROCEDURE — 3075F PR MOST RECENT SYSTOLIC BLOOD PRESS GE 130-139MM HG: ICD-10-PCS | Mod: CPTII,S$GLB,, | Performed by: OTOLARYNGOLOGY

## 2022-05-26 PROCEDURE — 1101F PR PT FALLS ASSESS DOC 0-1 FALLS W/OUT INJ PAST YR: ICD-10-PCS | Mod: CPTII,S$GLB,, | Performed by: OTOLARYNGOLOGY

## 2022-05-26 PROCEDURE — 3008F PR BODY MASS INDEX (BMI) DOCUMENTED: ICD-10-PCS | Mod: CPTII,S$GLB,, | Performed by: OTOLARYNGOLOGY

## 2022-05-26 PROCEDURE — 4010F PR ACE/ARB THEARPY RXD/TAKEN: ICD-10-PCS | Mod: CPTII,S$GLB,, | Performed by: OTOLARYNGOLOGY

## 2022-05-26 PROCEDURE — 1125F PR PAIN SEVERITY QUANTIFIED, PAIN PRESENT: ICD-10-PCS | Mod: CPTII,S$GLB,, | Performed by: OTOLARYNGOLOGY

## 2022-05-26 PROCEDURE — 1101F PT FALLS ASSESS-DOCD LE1/YR: CPT | Mod: CPTII,S$GLB,, | Performed by: OTOLARYNGOLOGY

## 2022-05-26 PROCEDURE — 99999 PR PBB SHADOW E&M-EST. PATIENT-LVL IV: CPT | Mod: PBBFAC,,, | Performed by: OTOLARYNGOLOGY

## 2022-05-26 PROCEDURE — 1159F MED LIST DOCD IN RCRD: CPT | Mod: CPTII,S$GLB,, | Performed by: OTOLARYNGOLOGY

## 2022-05-26 PROCEDURE — 3079F PR MOST RECENT DIASTOLIC BLOOD PRESSURE 80-89 MM HG: ICD-10-PCS | Mod: CPTII,S$GLB,, | Performed by: OTOLARYNGOLOGY

## 2022-05-26 PROCEDURE — 99024 PR POST-OP FOLLOW-UP VISIT: ICD-10-PCS | Mod: S$GLB,,, | Performed by: OTOLARYNGOLOGY

## 2022-05-26 PROCEDURE — 3075F SYST BP GE 130 - 139MM HG: CPT | Mod: CPTII,S$GLB,, | Performed by: OTOLARYNGOLOGY

## 2022-05-26 PROCEDURE — 4010F ACE/ARB THERAPY RXD/TAKEN: CPT | Mod: CPTII,S$GLB,, | Performed by: OTOLARYNGOLOGY

## 2022-05-26 PROCEDURE — 3288F PR FALLS RISK ASSESSMENT DOCUMENTED: ICD-10-PCS | Mod: CPTII,S$GLB,, | Performed by: OTOLARYNGOLOGY

## 2022-05-26 PROCEDURE — 1159F PR MEDICATION LIST DOCUMENTED IN MEDICAL RECORD: ICD-10-PCS | Mod: CPTII,S$GLB,, | Performed by: OTOLARYNGOLOGY

## 2022-05-26 PROCEDURE — 1125F AMNT PAIN NOTED PAIN PRSNT: CPT | Mod: CPTII,S$GLB,, | Performed by: OTOLARYNGOLOGY

## 2022-05-26 PROCEDURE — 3079F DIAST BP 80-89 MM HG: CPT | Mod: CPTII,S$GLB,, | Performed by: OTOLARYNGOLOGY

## 2022-05-26 PROCEDURE — 3008F BODY MASS INDEX DOCD: CPT | Mod: CPTII,S$GLB,, | Performed by: OTOLARYNGOLOGY

## 2022-05-26 PROCEDURE — 99024 POSTOP FOLLOW-UP VISIT: CPT | Mod: S$GLB,,, | Performed by: OTOLARYNGOLOGY

## 2022-05-26 PROCEDURE — 3288F FALL RISK ASSESSMENT DOCD: CPT | Mod: CPTII,S$GLB,, | Performed by: OTOLARYNGOLOGY

## 2022-05-26 RX ORDER — DEXAMETHASONE 4 MG/1
TABLET ORAL
COMMUNITY
Start: 2022-03-25

## 2022-05-26 RX ORDER — ONDANSETRON 4 MG/1
TABLET, ORALLY DISINTEGRATING ORAL
COMMUNITY
Start: 2022-05-09

## 2022-05-26 RX ORDER — OXYCODONE HYDROCHLORIDE 10 MG/1
TABLET ORAL
COMMUNITY
Start: 2022-05-09 | End: 2022-08-29 | Stop reason: ALTCHOICE

## 2022-05-26 RX ORDER — LANOLIN ALCOHOL/MO/W.PET/CERES
1 CREAM (GRAM) TOPICAL 3 TIMES DAILY
COMMUNITY
Start: 2022-05-06

## 2022-06-09 ENCOUNTER — TELEPHONE (OUTPATIENT)
Dept: HEMATOLOGY/ONCOLOGY | Facility: CLINIC | Age: 67
End: 2022-06-09
Payer: MEDICARE

## 2022-06-09 DIAGNOSIS — M25.9 SHOULDER JOINT DYSFUNCTION: Primary | ICD-10-CM

## 2022-06-09 NOTE — TELEPHONE ENCOUNTER
Returned call, pt requesting new referral for PT in the cancer center.  Referral placed, integrative oncology notified, requested that pt/wife call me if they haven't been contacted by Monday 6/13/22.  Wife verbalized agreement.

## 2022-06-09 NOTE — TELEPHONE ENCOUNTER
----- Message from Carmen Ribeiro sent at 6/9/2022  1:41 PM CDT -----  Type:Needs Medical Advice    Who Called:Nohemi (Wife)  Best call back number:[phone]  Additional Info: Requesting a call back regarding#PT request a referral to Physical Therapy here at the [facility].  Please Advise- Thank you

## 2022-06-13 ENCOUNTER — DOCUMENTATION ONLY (OUTPATIENT)
Dept: ADMINISTRATIVE | Facility: OTHER | Age: 67
End: 2022-06-13
Payer: MEDICARE

## 2022-06-14 ENCOUNTER — TELEPHONE (OUTPATIENT)
Dept: HEMATOLOGY/ONCOLOGY | Facility: CLINIC | Age: 67
End: 2022-06-14
Payer: MEDICARE

## 2022-06-14 NOTE — TELEPHONE ENCOUNTER
I spoke with Seven and offered him a few appts coming up to see if he was interested in schedule for PT. He voiced acceptance of an appt this Friday. Location reviewed.

## 2022-06-16 NOTE — PROGRESS NOTES
2022      Hudson Seals M.D.   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  Right modified radical neck dissection in 2022.  Unknown head and neck primary.   Group pathologic stage IVB, cT0 pN3b (5/39 right cervical lymph nodes were positive, including major extracapsular extension to the sternocleidomastoid muscle, internal jugular vein, adventitia of the common carotid artery and the right pharyngeal constrictors muscles; largest lymph node was 3.1 cm) cM0.  PD-L1 positive.          Dear Hudson:     The patient returns one month after completing postoperative irradiation delivered with concurrent weekly cisplatin.     On May 26, the patient was seen by Dr. Sellers  in follow up and there was no evidence of recurrence. The patient was complaining of fatigue. The patient has noted lymphedema in his neck.  He has been referred for lymphedema therapy, although he has actually not started lymphedema therapy yet.     The patient is doing reasonably well today.  His weight is 179 pounds and this compares with 185 pounds on May 13.  The patient is not smoking, although he does admit to occasional vaping.  His pain continues to improve and currently he is using oxycodone 10 three pills a day.     Physical exam shows a man in no distress.  There is some lymphedema in the submental area. There is no lymphadenopathy.  Examination of the oral cavity and oropharynx is unremarkable.     Fiberoptic examination of the entire pharynx and larynx is unremarkable.     Assessment and Plan:  Symptomatically, the patient is doing well and there is no evidence for tumor recurrence on history or physical exam findings.  We went ahead and gave the patient another prescription for oxycodone 5, #40 pills.  I instructed him in antitrismus exercises  and fluoride for dental protection.  On June 21, the patient sees Dr. Haddad.  On June 23, the patient has an appointment with lymphedema specialist.  On December 8, the patient will see Dr. Sellers.  You  will also be seeing the patient.   In two months from now, I will obtain a PET-CT scan and I will see the patient shortly after that to discuss the results of the PET-CT.     I appreciate the opportunity to consult on your patients. Please call me with any questions or comments.     Sincerely,          Electronically Signed By:  MARISA Uriarte/Gabe  DD:  06/16/2022  DT:  06/16/2022  Job #:  [id number]    CC:  Ewa Jaramillo M.D., [address] [place] [id number], Fax: [phone]        Hudson Seals M.D., [address] [place] [address], Fax: [phone]        Kalpesh Haddad M.D., [address] [place] [address], Fax: [phone]        Juanita Sellers M.D., [address], [place] [address], Fax: [phone]

## 2022-06-17 ENCOUNTER — CLINICAL SUPPORT (OUTPATIENT)
Dept: REHABILITATION | Facility: HOSPITAL | Age: 67
End: 2022-06-17
Attending: NURSE PRACTITIONER
Payer: MEDICARE

## 2022-06-17 DIAGNOSIS — M25.9 SHOULDER JOINT DYSFUNCTION: Primary | ICD-10-CM

## 2022-06-17 DIAGNOSIS — I89.0 LYMPHEDEMA: ICD-10-CM

## 2022-06-17 PROCEDURE — 97110 THERAPEUTIC EXERCISES: CPT | Mod: PN

## 2022-06-17 PROCEDURE — 97140 MANUAL THERAPY 1/> REGIONS: CPT | Mod: PN

## 2022-06-17 PROCEDURE — 97530 THERAPEUTIC ACTIVITIES: CPT | Mod: PN
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
## 2022-06-17 NOTE — PATIENT INSTRUCTIONS
Self Manual Lymphatic Drainage Face, Head and Neck    For patients who were treated for head and neck cancer with:    Surgery to remove a tumour(s) or lymph nodes   Radiation therapy    Read this resource to learn:   what is your lymphatic system   what lymphatic self-massage is and why it is important   how to do lymphatic self-massage step-by-step    What is your lymphatic system?  Your lymphatic system filters and removes extra fluid and waste from your body. It plays an important role in your immune function. Your lymphatic system is made up of many lymph nodes that are connected together by lymph vessels.     Your lymph nodes are bean-shaped organs that are found all over your body. Large groups or chains of lymph nodes can be found in your neck, under your arms and in your groin (see the image below).     Surgery or radiation to your lymph nodes damage your lymph nodes and vessels. This damage prevents fluid from flowing well and causes swelling. Swelling from damaged lymph nodes and vessels is called lymphedema    What is lymphatic self-massage and why is it important?  Lymphatic self-massage is a gentle skin massage where the skin is gently stretched and released along lymph pathways. Lymphatic self-massage helps move extra fluid from swollen areas damaged by cancer treatment. This extra fluid can be moved into an area where the lymph nodes are working well.    Lymphatic self-massage can help move extra fluid away from:    areas of your face that have had treatment   areas of your neck that have had treatment    Lymphatic self-massage can help to move extra fluid to:    lymph vessels and lymph nodes in areas of your face or neck not affected by treatment   lymph vessels and lymph nodes in your underarms    How to do lymphatic self-massage   Keep your hands soft and relaxed. Use a light pressure on your skin. The pressure of your hands should be just enough to gently stretch the skin. Only stretch the skin as  far as it can go naturally without causing pain. Release the pressure and let your skin come back as it was. If you can feel your muscles under your skin you are pressing too hard.   Use the flat part (palms) of your hands instead of your fingertips. Your palms allow more contact with the skin to stimulate (pump) the lymph vessels.   Massage towards areas of your body that have not been treated for cancer such as your chest and underarms.   Make sure you are in a comfortable position. You can self-massage while sitting, standing or lying down. Choose a position that is most comfortable for you.   Massage when you are comfortably warm or when you are in a nice, warm room. If your muscles are warm, they are more flexible.   Do self-massage regularly. You can use self-massage as time to relax, breathe, and take care of yourself.    What to avoid   Do not strain your shoulders, neck, arm or hand   Do not self-massage if it causes pain   Do not do self-massage if you have an infection in the area that has swelling    Do not do self-massage if you think you have an infection. Infections can occur in your head, neck or face where your lymph nodes have been removed or you have had radiation.     Signs of an infection may include:    Swelling and redness of the skin. This redness can quickly spread.   Pain or soreness in your head, face or neck where you had treatment.   Warm or hot feeling in your head, face or neck where you had treatment.   Fever or chills.   Feeling unwell.    If you think you have an infection go to:   Your family doctor   Walk-in Clinic   Urgent Care Clinic   Emergency department    If you have had an infection, only start self-massage again when you have finished your antibiotics (medicine). Or if your doctor says it is okay to start again.    Try different ways to make self-massage a part of your routine. Try self-massage while you are watching TV or having a shower so it does not take time away from  your day. Try to make self-massage a time for yourself. Or make it a part of your routine for relaxing    Below are the steps for doing a lymphatic self-massage. Follow the instructions closely. Talk to your health care team if you have any questions.    Deep Breathing    Deep breathing is an important part of your self-care. Deep breathing works like a pump in your body. This pump helps the lymph nodes and vessels move fluid. You can practice deep breathing at any time!   What to do:    Place the palms of both hands on your stomach.   Take a deep breathe in through your nose until your stomach pushes against your hands.   Breathe out slowly through pursed lips (like you are blowing out candles). Then let your stomach go flat.   Repeat 5 times. Take a short rest between each breath so you do not feel dizzy       2. Stretch and release the skin at the front of your neck    This motion helps lymph fluid drain back to your heart. You can massage one side at a time or both sides at the same time. You may find it easier to cross your hands if you are doing both at the same time.   What to do:    Place the flats of your 2nd and 3rd fingers on either side of your neck just above your collarbone.   Massage down and inwards toward your collarbone. Always keep your fingers above your collarbone. Start massage on the area of your neck that is close to your shoulder and gently stretch the skin towards the middle of your neck.   Gently stretch the skin just as far as it goes without pain. Then let go of the skin.   This massage will look like 2 letter Js facing one another.   Repeat 10-15 times on each side.     3. Prepare your underarm lymph nodes    This prepares the lymph vessels and nodes under your arm to take in lymph fluid from your face and neck. Place your arm in a comfortable position. Your arm should be slightly raised and supported. You may want to place your arm on an armrest or  table for comfort.   What to do:     Place your palm of your opposite hand against your underarm.   Gently pull up and in toward your body, then release. Pause for a moment and then start again. Repeat 10-15 times   Do the same pumping on your other underarm. Repeat 10-15 times.       4. Stretch and release the skin from your chest to your underarm    Place you hand on your collarbone. Move your hand down your chest in half circles toward your underarm. Massage your chest to help reduce swelling. This massage will move the lymph fluid from your neck and chest to your underarm lymph vessels and nodes.   What to do:    Place your hand over your collar bone   Gently stretch the skin (not muscles) down your chest and towards your underarm. Then let go of the skin. Pause for a moment. Now repeat this massage stroke as you gradually move your hand down your chest towards your underarm.   Repeat this massage 10-15 times.     5. Stretch and release the skin from the front of your neck to your chest    Massage the front of your neck to help reduce swelling. This massage will move the lymph fluid from your neck to your chest. Place your hand on the front of your neck where you have swelling. Move your hand down your neck towards your collar bone and chest.     What to do:    Place your hand over the swelling at the front of your neck   Gently stretch the skin (not muscles) towards your collarbone. Then let go of the skin. Pause for a moment. Now repeat this massage stroke as you gradually move your hand past your collar bone and down your chest.   Repeat this massage 10-15 times.     6. Stretch and release the skin at the side of your neck    Massage the side of your neck to lower or prevent the swelling in your face and neck. This massage helps stimulate (pump) the vessels at the side of your neck. Do not massage both sides of your neck at the same time if you have radiation treatment.   What to do:    Place your hands flat on the side of your neck   Gently  stretch the skin away from your face and down. Then release.   Massage your neck and side of the face in a slow and gentle way.   Repeat 10-15 times.     7. Stretch and release the skin on the back of your neck    Massage the back of your neck to lower or prevent the swelling in your face and neck. This massage helps stimulate (pump) the vessels at the back of your neck.   What to do:    Place the palms of your hands on the back of your neck, just belowyour hairline.   Stretch the skin towards your spine and then down towards your back.   Repeat 10-15 times.       8. Massage your scar     Massage your scar. This massage is only if you have had surgery. Do not massage until three weeks after surgery. Do not massage until all staples and clips have been removed. Your scar may feel very sensitive, tight or itchy. Scar massage will help reduce these feelings.This massage helps soften the scar and allows better blood flow to the area. Scar massage should always be pain-free. Do not use oil while doing the scar massage. Apply any lotions or oils after the massage.    What to do:    Place the palm of your hand over the scar.    Move up and down in a zigzag pattern or [place] pattern along the scar. See pictures above for help.   Apply firm but gentle pressure while moving along the scar. Try to move the skin.   If possible, gently lift the skin along the scar.   Repeat 5 or 6 times on the scar.   Now place your fingertips just above the scar. Gently stretch the skin away from the scar and release.   Repeat 5 times.   Place your fingertips below the scar. Gently stretch the skin away from the scar. Then release the skin.   Repeat 5 times. If your skin and swollen tissue in your neck or face feels hard, ask your therapist to show you gentle kneading techniques to help soften the firm tissue.    Here is how you will soften the firm tissue on your own at home:   Gently place the pads of your fingers on the tissue or skin that  feels firm or hard.   Gently press down with the pads of your fingers and let go. As you release the pressure, move your fingers down slightly.    Repeat 10-15 times in one area. Move to another area and repeat.     9. Massage for face swelling     Your therapist will draw arrows on the image. This will show you the   way you should do your self-massage.     10. Massage for swelling inside the mouth    Your therapist will draw arrows on the image. This will show you the way you should do your self-massage.   Do not massage inside your mouth if you have any sores   or cuts or pain.   Make sure your hand is clean before you start to massage inside your mouth.     11. Stretch and release the skin at the front of your neck    This motion helps lymph fluid drain back to your heart. You can massage 1 side at a time or both sides at the same time. You may find it easier to cross your hands if you are doing both at the same time.   What to do:    Place the flats of your 2nd and 3rd fingers on either side of your neck just above your collarbone.   Massage down and inwards toward your collarbone. Always keep your fingers above your collarbone. Start massage on the area of your neck that is close to your shoulder and gently stretch the skin towards the middle of your neck.   Gently stretch the skin just as far as it goes without pain. Then let go of the skin.   This massage will look like 2 letter Js facing one another.   Repeat 15 times on each side.      Pulleys can be obtained online at [employer]. Towel exercises on wall for range of motion of R shoulder and neck.  SHOULDER: Flexion At Wall        Slide towel up wall ( underneath R hand) as far as you can forward, stand close to wall. Then turn to your left and turn back and bring R hand down, Repeat 10 x.    Facing wall:   Shoulder abduction R arm is out to the side, you are close to wall and you press towel into wall and attempt to go up towards top as far as you can. 10  reps.    We discussed Flexitouch Head and Neck Pneumatic Pump by [facility]

## 2022-06-22 ENCOUNTER — TELEPHONE (OUTPATIENT)
Dept: HEMATOLOGY/ONCOLOGY | Facility: CLINIC | Age: 67
End: 2022-06-22
Payer: MEDICARE

## 2022-06-22 NOTE — TELEPHONE ENCOUNTER
I spoke with Nohemi about getting [facility] PT here or at [address] [facility] so he can get ortho/lymph. Nohemi said he wants to have tx's here so was scheduled for first available in July and then they are going to [place] for 2 weeks and scheduled 2 appts last week of July. She voiced acceptance of appts.    ----- Message from Dayana Hutchins sent at 6/22/2022 11:12 AM CDT -----  Type:  Patient Returning Call    Who Called:  Nohemi (spouse)  Who Left Message for Patient:  Juan  Does the patient know what this is regarding?: no  Best Call Back Number:  [phone]  Additional Information:  Ms. Freire returning call for .

## 2022-07-05 ENCOUNTER — CLINICAL SUPPORT (OUTPATIENT)
Dept: REHABILITATION | Facility: HOSPITAL | Age: 67
End: 2022-07-05
Attending: PSYCHIATRY & NEUROLOGY
Payer: MEDICARE

## 2022-07-05 DIAGNOSIS — I89.0 LYMPHEDEMA: ICD-10-CM

## 2022-07-05 DIAGNOSIS — M25.9 SHOULDER JOINT DYSFUNCTION: Primary | ICD-10-CM

## 2022-07-05 PROCEDURE — 97140 MANUAL THERAPY 1/> REGIONS: CPT | Mod: PN,CQ

## 2022-07-05 PROCEDURE — 97110 THERAPEUTIC EXERCISES: CPT | Mod: PN,CQ

## 2022-07-05 NOTE — PROGRESS NOTES
"                                                    Physical Therapy lymphedema Note     Patient: Seven Freire  Clinic #: [id number]    Date: 7/5/2022  Physician: Juanita Sellers MD  Diagnosis:   Encounter Diagnoses   Name Primary?    Shoulder joint dysfunction Yes    Lymphedema      Evaluation Date: 04/26/2022  Visit #/Visits authorized: 18 visits auth   Authorization Dates: 4/29/2022- 07/29/2022  Plan of Care Expiration: 07/29/2022  MD order: PT Eval and Treat  PTA visit #1  Time in:  1:00 PM  Time out:  2:00 PM      Subjective   Reports no energy. "I didn't want to come today, but my wife made me. I take three naps a day." No appetite and still can't taste food much, "I have to make myself eat." Pt states that they will be gone for several weeks; have to [employer] due to daughter going to [place]. Will return in about a month. Pt reports that he sleeps on an incline.   Seven Freire rates pain at a 4/10 where 0 = no pain and 10 = maximal pain. Anterior neck/throat. 4/10 throat and R anterior shoulder and at surgical incision    Objective   Pt performed therapeutic exercises as follows x 10 minutes:  Seated shoulder rolls  Scapular retractions with YTB x 10  Shoulder pulldowns with YTB x 10  Diaphragmatic breathing  Cervical rotation/lateral flexion/and flexion/extension x 3 reps ea  O2 sats 94% p. 104     Manual Lymphatic Drainage was applied to bilateral neck as follows: for 50 minutes: Pre-treatment short neck series, cervical terminus, pre and post auricular, bilateral axillae, abdomen (educated on thoracic duct), and inguinal triangle. Posterior and lateral  cervical area, accessing watershed areas on trunk bilaterally. Lymphatouch to right lateral neck, submental nodes, clavicular nodes at 60 mmHg.     Strongly encouraged pt to begin walking program, gradually increasing distance. Pt verbalized understanding    Assessment   Patient reports was too ill to return to therapy not feeling well enough, but is " now ready and motivated. Encouraged patient to increase activity level at home.  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 Patient completed  chemotherapy 1x a week and 30 tx of radiation at [facility] in [place]. Pt primary complain is of severe fatigue with ADLs and any standing activity.  Patient presents with lymphedema of the head/neck with fullness to submandibular region and anterior/lateral neck, increased pain, increased stiffness in the right shoulder and cervical range of motion, as well as difficulty performing overhead reaching with flexion and abduction ranges and placing the pt at rist of higher infection. This pt would benefit from skilled therapy services to address the above impairments.    Medical necessity is demonstrated by the following IMPAIRMENTS/PROBLEMS:  1. Increased Pain  2. Decreased ROM  3. Decreased BUE strength  4. Postural Imbalance  5. Decreased Tolerance to Functional Activities    The following goals were discussed with the patient and patient is in agreement with them as to be addressed in the treatment plan.     Short Term Goals: (3 weeks)  1. Decrease cervical girth by 1 cm  2. Patient will demonstrate 100% knowledge of lymphedema precautions and signs of infection.   3. Patient demonstrates independence with Postural Awareness.   4. Patient will perform self lymph drainage techniques.  5. Patient demonstrates independence with HEP.       Long Term Goals: (6 weeks)  1. Decrease cervical girth by 2 cm.  2. Patient will perform self lymph drainage techniques independently.  3. Patient to tomas/doff compression garment.   4. Patient demonstrates increased R shoulder AROM flexion 160 or greater to improve tolerance to functional activities pain free.   5. Patient demonstrates increased R shoulder AROM abduction 165  or greater to improve tolerance to functional activities pain free.   6. Patient demonstrates increased strength BUE's to 4+/5 or greater to improve tolerance to functional activities pain free.   7. Patient to report improvement in overall endurance, participating in 20-30 minute walking program 3-4x week.         PLAN   Patient to be seen 2-3 x per week for 18 visits for the medically necessary treatments to include: decongestive massage, intermittent compression pump, multi layered bandaging, education in lymphedema precautions, self massage, self bandaging, and assistance in obtaining appropriate compression garment. Pt may be seen by PTA as part of the rehabilitation team.   Next visit: have pt complete lymphedema survey and do 6 min walk test  Rochelle Leslie PTA, CLT  7/5/2022    PT/PTA met face to face to discuss patient's treatment plan and progress towards established goals.  Patient will be seen by physical therapist every sixth visit and minimally once per month.

## 2022-07-07 ENCOUNTER — TELEPHONE (OUTPATIENT)
Dept: HEMATOLOGY/ONCOLOGY | Facility: CLINIC | Age: 67
End: 2022-07-07
Payer: MEDICARE

## 2022-07-07 NOTE — TELEPHONE ENCOUNTER
I spoke with Seven and he asked me to cancel his PT appt for today because they are out of town. He said he will be gone for 2 weeks and will call back to schedule once in town.       ----- Message from Carmen Ribeiro sent at 7/7/2022 12:28 PM CDT -----  Regarding: For Rochelle Leslie  Type:Needs Medical Advice    Who Called:#Wife  Best call back number:[phone]  Additional Info: Requesting a call back regarding#PT needs to cancel appt for today, he will be out of Town  Please Advise- Thank you

## 2022-07-26 ENCOUNTER — PATIENT MESSAGE (OUTPATIENT)
Dept: HEMATOLOGY/ONCOLOGY | Facility: CLINIC | Age: 67
End: 2022-07-26
Payer: MEDICARE

## 2022-07-26 ENCOUNTER — TELEPHONE (OUTPATIENT)
Dept: HEMATOLOGY/ONCOLOGY | Facility: CLINIC | Age: 67
End: 2022-07-26
Payer: MEDICARE

## 2022-07-26 NOTE — TELEPHONE ENCOUNTER
I spoke with Nohemi and she scheduled 3 more PT appts for Seven and when I asked her if she wanted to schedule another week she asked to leave it like it is for now and will work 1 week at a time.

## 2022-07-27 ENCOUNTER — DOCUMENTATION ONLY (OUTPATIENT)
Dept: ADMINISTRATIVE | Facility: OTHER | Age: 67
End: 2022-07-27
Payer: MEDICARE

## 2022-07-28 ENCOUNTER — CLINICAL SUPPORT (OUTPATIENT)
Dept: REHABILITATION | Facility: HOSPITAL | Age: 67
End: 2022-07-28
Attending: OTOLARYNGOLOGY
Payer: MEDICARE

## 2022-07-28 DIAGNOSIS — M25.9 SHOULDER JOINT DYSFUNCTION: Primary | ICD-10-CM

## 2022-07-28 DIAGNOSIS — I89.0 LYMPHEDEMA: ICD-10-CM

## 2022-07-28 PROCEDURE — 97140 MANUAL THERAPY 1/> REGIONS: CPT | Mod: PN

## 2022-07-28 NOTE — PROGRESS NOTES
2022      Hudson Seals M.D.   [address] [place] [address]      RE: Seven Freire    MR#:  [id number]    :  1955       DX: 1.   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  Right modified radical neck dissection in 2022.  Unknown head and neck primary.   Group pathologic stage IVB, cT0 pN3b (5/39 right cervical lymph nodes were positive, including major extracapsular extension to the sternocleidomastoid muscle, internal jugular vein, adventitia of the common carotid artery and the right pharyngeal constrictors muscles; largest lymph node was 3.1 cm) cM0.  PD-L1 positive.    2,     My 2022 evaluation suggests severe depression that will require treatment           Dear Hudson:     Your patient came in for an unscheduled visit today because of urgent problems.  On , the patient saw Dr. Haddad in follow up.  On , CBC blood count performed by Dr. Haddad was fairly unremarkable.  Comprehensive metabolic profile was remarkable for an alkaline phosphatase elevated at 315 with normal being less than 144.     The patient said that he had been going to lymphedema treatment for his neck and the lymphedema treatment was working; however, he went to [place] for three weeks and he had regression of the lymphedema because he was not going to [facility] Clinic.     The patient has multiple complaints that initially I had a hard time understanding.  He complains of pain throughout his whole mouth.  He also complains of pain in his neck and pain in his shoulder.  The pain is rated by the patient as a nine out of a total of a 10 pain.  This pain seemed to be not completely consistent with what I was observing.  I asked the patient if he was doing antitrismus exercises and he said he was not.  He also admits to not using his fluoride.  The patient's weight today is 169 pounds, and this compares with 179 pounds on  June 13.  The patient said he is swallowing okay, but he is not motivated to eat.  The patient said he has been off all pain medications for the past three weeks.  As you may recall, the patient has a history of being on narcotic pain medicines for many, many years under the direction of Pain Management; however, a few months before the diagnosis of cancer he was completely taken off pain medicines.  The patient said he had stopped smoking, although the wife mentioned that he is still vaping.     Physical examination shows a man who appears markedly clinically depressed.  When I told the patient that he appeared to be depressed and I asked him about this, the patient broke down in tears and admitted that he was extremely severely depressed and this had only been aggravated by the recent death of his 43-year-old daughter who committed suicide by hanging herself.  The patient had considerable lymphedema in his neck.  When I asked the patient about the details, he admitted that he is lying down almost all the time because of his severe depression and this almost certainly is making the lymphedema in his neck worse.  I do not feel any lymphadenopathy.  Examination of the oral cavity and oropharynx appear completely normal, and I do not see any unhealed mucosa or other reasons why he should be having mouth pain.     Assessment and Plan:  I think that a great many of the patient's difficulties are related to very severe depression and the patient is in agreement with this assessment.  Only after I asked possible depression , did the patient break down in tears and admit that he is exceedingly depressed, and this has been aggravated by the death of his 43-year-old daughter who committed suicide by hanging herself.  I will have our  try to help get the patient help with this severe and urgent depression.  I will also be getting the patient scheduled to see a Pain Management doctor, although I think that much of  the patient's pain is related to depression.  I did not prescribe pain medications for the patient, and I think that pain management should be handled by a professional such as a Pain Management doctor.  The patient needs to get back with his lymphedema treatment and I have told the patient that if he lies down all day his lymphedema in his neck will likely be worse.  The patient was strongly encouraged to do antitrismus exercises every day, and use fluoride and other dental protection for dental prophylaxis.  The patient will see Dr. Haddad on July 28.  On August 12, a PET-CT scan will be performed, and I will see the patient in follow up on August 15.  The patient sees Dr. Sellers on December 8.     I appreciate the opportunity to consult on your patients.  Please call me with any questions or comments.     Sincerely,       Electronically Signed By:  MARISA Uriarte/Gabe  DD:  07/28/2022  DT:  07/28/2022  Job #:  [id number]    CC:  Ewa Jaramillo M.D., [address] [place] [address], Fax: [phone]        Kalpesh Haddad M.D., [address] [place] [address], Fax: [phone]        Juanita Sellers M.D., [address], [place] [address], Fax: [phone]

## 2022-07-28 NOTE — PROGRESS NOTES
Physical Therapy Re-Assessment Lymphedema Note     Patient: Seven Freire  [facility] #: [id number]    Date: 7/28/2022  Physician: Octaviano Wallace,*  Diagnosis: Lymphedema, shoulder dysfunction  Evaluation Date: 04/26/2022  Visit #/Visits authorized: 4/ 18 visits auth   Authorization Dates: 4/29/2022- 07/29/2022  Plan of Care Expiration: 07/29/2022  Re-assessment: 7/28/2022  MD order: PT Eval and Treat  PTA visit #0  Time in:  2:30:PM late coming from MD office  Time out:  3:35:PM  Time: 65 minutes    Subjective   Reports no energy. Feels very fatigued. Reports increase in swelling in neck    Objective     Shoulder Range of Motion:   ACTIVE ROM LEFT EVAL 4/26/2022 RIGHT EVAL 4/26/2022 LEFT Re-Assess  6/17/2022 RIGHT Re- Assess  06/17/2022 RIGHT UE Re-assess 7/28/2022   Flexion 170 155 164 150 90    Abduction 170 145 (P) 173 140 with pain during movement and ERP 90 with pain during movement and end range pain   Extension WNL WNL WNL WNL    IR WNL WNL WNL WNL    ER WNL WNL 55 55 40      STRENGTH LEFT  EVAL 4/26/2022 RIGHT EVAL 4/26/2022 LEFT Re-Assess  6/17/2022 RIGHT Re- Assess  06/17/2022 R UE re-assess 07/28/2022   Flexion 5/5 4/5 5/5 4/5 3-/5   Abduction 5/5 4-/5 5/5 4-/5 3-/5   Extension 5/5 4/5 5/5 4/5 3+/5   IR (neutral) 4+/5 4/5 5/5 4/5 4/5   ER (neutral) 4+/5 4/5 4+/5 4/5 3+/5   Middle Trap 4+/5 4/5 4+/5 4/5 3+/5   Lower Trap 4+/5 4/5 4/5 4-/5 3+/5      CERVICAL SPINE AROM:     eval 4/26/2022  Re-assess 6/17/2022 Re-assess 7/28/2022   Flexion: WNL  WNL 30 deg   Extension: 20 deg 20 deg 30 deg   Left Sidebend: 18 deg 16 deg 27 deg   Right Sidebend: 20 deg 18 deg 18 deg   Left Rotation: 35 deg 38 deg 35 deg   Right Rotation: 40 deg 55 deg 47 deg   Mandibular depression   50 cm 37 mm         Girth Measurements (in centimeters)  LANDMARK Eval 4/26/2022 Re-assess 6/17/2022 Re-assess 07/28/2022   2 in below earlobe 52.0 cm 49.0 cm 51 cm   3 in below earlobe  50.0  cm 47.7 cm 49.5 cm   4 in below earlobe 49.5 cm 45.0 cm 47.2 cm   Increase in measurements since last reassess, pt reports lying down more frequently.         Tissue with reduction in pliability prior to treatment, and fullness with some tenderness to touch in submental region and at midline of clavicular space.     Pt performed therapeutic exercises as follows x 10 minutes:  Chin tucks  Cervical rotation  Cervical lateral flexion  Diaphragmatic breathing  deferred  Seated shoulder rolls   Scapular retractions with YTB x 10  Shoulder pulldowns with YTB x 10  Diaphragmatic breathing  Cervical rotation/lateral flexion/and flexion/extension x 3 reps ea  O2 sats 94% p. 104     Manual Lymphatic Drainage was applied to bilateral neck as follows: for 55 minutes: Pre-treatment short neck series, cervical terminus, pre and post auricular, bilateral axillae, abdomen (educated on thoracic duct), and inguinal triangle. Posterior and lateral  cervical area, accessing watershed areas on trunk bilaterally. Lymphatouch to right and left lateral neck, submental nodes, clavicular nodes at 30-60 mmHg. Pt with initially some tenderness to anterior neck, which dissapaited with MLD. Applied rock tape in weave pattern to anteior neck/submental region. Advised pt to remove if he has any irritation with rock tape or redness. Instructed pt to wear up to 72 hours and explained how to remove tape with good understanding verbalized. Clinic out of [employer] padding, when in stock will make a night time fabricated garment to assist with congestion at neck.     Strongly encouraged pt to begin walking program, gradually increasing distance. Pt verbalized understanding    Assessment   Patient reports has not been feeling well  but is now ready and motivated. Encouraged patient to increase activity level at home. Pt has reduction in cshoulder arom, increase in girth measurements and increased weakness with nonattendance to PT treatments. Pt plans to  schedule and get back to working on therapy goals.  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 Patient completed  chemotherapy 1x a week and 30 tx of radiation at [facility] in [place]. Pt primary complain is of severe fatigue with ADLs and any standing activity.  Patient presents with lymphedema of the head/neck with fullness to submandibular region and anterior/lateral neck, increased pain, increased stiffness in the right shoulder and cervical range of motion, as well as difficulty performing overhead reaching with flexion and abduction ranges and placing the pt at rist of higher infection. This pt would benefit from skilled therapy services to address the above impairments.    Medical necessity is demonstrated by the following IMPAIRMENTS/PROBLEMS:  1. Increased Pain  2. Decreased ROM  3. Decreased BUE strength  4. Postural Imbalance  5. Decreased Tolerance to Functional Activities    The following goals were discussed with the patient and patient is in agreement with them as to be addressed in the treatment plan.     Short Term Goals: (3 weeks)  1. Decrease cervical girth by 1 cm  2. Patient will demonstrate 100% knowledge of lymphedema precautions and signs of infection.   3. Patient demonstrates independence with Postural Awareness.   4. Patient will perform self lymph drainage techniques.  5. Patient demonstrates independence with HEP.       Long Term Goals: (6 weeks)  1. Decrease cervical girth by 2 cm.  2. Patient will perform self lymph drainage techniques independently.  3. Patient to tomas/doff compression garment.   4. Patient demonstrates increased R shoulder AROM flexion 160 or greater to improve tolerance to functional activities pain free.   5. Patient demonstrates increased R shoulder AROM abduction 165 or greater to improve  tolerance to functional activities pain free.   6. Patient demonstrates increased strength BUE's to 4+/5 or greater to improve tolerance to functional activities pain free.   7. Patient to report improvement in overall endurance, participating in 20-30 minute walking program 3-4x week.         PLAN   Patient to be seen 2-3 x per week for 18 visits for the medically necessary treatments to include: exercises, decongestive massage, intermittent compression pump, multi layered bandaging, education in lymphedema precautions, self massage, self bandaging, and assistance in obtaining appropriate compression garment. Pt may be seen by PTA as part of the rehabilitation team. Has 14 visits left on POC that was sent in to insurance at [facility].   Next visit: have pt complete lymphedema survey and do 6 min walk test if able  Laurence Bahena, PT, CLT  7/28/2022    PT/PTA met face to face to discuss patient's treatment plan and progress towards established goals.  Patient will be seen by physical therapist every sixth visit and minimally once per month.

## 2022-08-01 ENCOUNTER — CLINICAL SUPPORT (OUTPATIENT)
Dept: REHABILITATION | Facility: HOSPITAL | Age: 67
End: 2022-08-01
Payer: MEDICARE

## 2022-08-01 DIAGNOSIS — M25.9 SHOULDER JOINT DYSFUNCTION: Primary | ICD-10-CM

## 2022-08-01 DIAGNOSIS — I89.0 LYMPHEDEMA: ICD-10-CM

## 2022-08-01 PROCEDURE — 97140 MANUAL THERAPY 1/> REGIONS: CPT | Mod: PN

## 2022-08-01 PROCEDURE — 97110 THERAPEUTIC EXERCISES: CPT | Mod: PN

## 2022-08-01 NOTE — PROGRESS NOTES
Physical Therapy Treatment Lymphedema Note     Patient: Seven Freire  Clinic #: [id number]    Date: 8/2/2022  Physician: Octaviano Wallace,*  Diagnosis: Lymphedema, shoulder dysfunction  Evaluation Date: 04/26/2022  Visit #/Visits authorized: 5/ 18 visits auth   Authorization Dates: 4/29/2022- 07/29/2022  Plan of Care Expiration: 07/29/2022  Re-assessment: 7/28/2022  MD order: PT Eval and Treat  PTA visit #0  Time in:  03:08 PM  Time out: 04:15 PM  Time: 68 minutes    Subjective   Felt so good after last session, that tape she put on my neck really helped too, my breathing was better and I felt like the mucus was better for a few days but It's getting thick again. The tape helped support my neck. When I swallow I can feel restrictions with the phlem in my throat, I'm suppose to gargle with baking soda mixture. I've been sleeping on an incline and not sleeping on my left side.     Reports no energy. Feels very fatigued. Reports increase in swelling in neck    Objective     O2 Sats 98%, HR 90  Tissue with reduction in pliability prior to treatment, and fullness with some tenderness to touch in submental region and at midline of clavicular space.     Pt performed therapeutic exercises as follows x 15 minutes:  UBE L1.5 for 6 minutes alt direction each minute  Scapular retractions with YTB x 10  Shoulder pulldowns with YTB x 10  Chin tucks  Cervical rotation  Cervical lateral flexion  Diaphragmatic breathing  Seated shoulder rolls   Cervical rotation/lateral flexion/and flexion/extension x 3 reps ea  O2 sats 94% p. 102       Manual Lymphatic Drainage was applied to bilateral neck as follows: for 53 minutes: Pre-treatment short neck series, cervical terminus, pre and post auricular, bilateral axillae, abdomen (educated on thoracic duct), and inguinal triangle. Posterior and lateral  cervical area, accessing watershed areas on trunk bilaterally. Lymphatouch to right  and left lateral neck, submental nodes, clavicular nodes at 50-60 mmHg. Pt with initially some tenderness to anterior neck, which dissapaited with MLD. Applied rock tape in weave pattern to anteior neck/submental region. Advised pt to remove if he has any irritation with rock tape or redness. Instructed pt to wear up to 72 hours and explained how to remove tape with good understanding verbalized. Clinic out of [employer] padding, when in stock will make a night time fabricated garment to assist with congestion at neck.     Patient frequently clearing his throat throughout treatment session, O2 sats WNL during standing and seated exercises 96-98%.   PT monitoring patients vitals throughout manual treatment as pt was dozing off, O2 sats would decrease to 88-90%, PT able to wake pt easily, with O2 sats returning to 97-98% with cueing for diaphragm breathing.       PT discussed briefly with patient about the Flexitouch head/neck pneumatic compression pump from [employer] to assist in managing his Lymphedema. Provided patient this information for him and his wife to look at it online.   PT to send pt info to [employer] to check insurance coverage. (pt in agreement)    Strongly encouraged pt to begin walking program, gradually increasing distance. Pt verbalized understanding    Assessment   Patient pleased with how he felt after last treatment session, less fullness sensation as well as breathing better, which will last for a few days but fullness returns. Patient reports sleeping on incline daily, tolerated rock tape well for 3 days without irritation. Pt reports Increased mucus and needing to clear his throat.    Patient with good tissue response to MLD, use of lympha touch to anterior neck and submental regions as well as exercises. Patients vital signs monitored throughout exercises, remaining stable with O2 sats  96-98%. However PT concerned as pt was dozing off while reclined/supine on treatment table for  manual treatment, O2 sats would decrease to 88-90%, PT able to wake pt easily, with O2 sats returning to 97-98% with cueing for diaphragm breathing. Will continue to monitor O2 stats.     Pt has reduction in cshoulder arom, increase in girth measurements and increased weakness with nonattendance to PT treatments. Pt plans to schedule and get back to working on therapy goals.  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 Patient completed  chemotherapy 1x a week and 30 tx of radiation at [facility] in [place]. Pt primary complain is of severe fatigue with ADLs and any standing activity.  Patient presents with lymphedema of the head/neck with fullness to submandibular region and anterior/lateral neck, increased pain, increased stiffness in the right shoulder and cervical range of motion, as well as difficulty performing overhead reaching with flexion and abduction ranges and placing the pt at rist of higher infection. This pt would benefit from skilled therapy services to address the above impairments.    Medical necessity is demonstrated by the following IMPAIRMENTS/PROBLEMS:  1. Increased Pain  2. Decreased ROM  3. Decreased BUE strength  4. Postural Imbalance  5. Decreased Tolerance to Functional Activities    The following goals were discussed with the patient and patient is in agreement with them as to be addressed in the treatment plan.     Short Term Goals: (3 weeks)  1. Decrease cervical girth by 1 cm  2. Patient will demonstrate 100% knowledge of lymphedema precautions and signs of infection.   3. Patient demonstrates independence with Postural Awareness.   4. Patient will perform self lymph drainage techniques.  5. Patient demonstrates independence with HEP.       Long Term Goals: (6 weeks)  1. Decrease cervical girth by 2 cm.  2. Patient will  perform self lymph drainage techniques independently.  3. Patient to tomas/doff compression garment.   4. Patient demonstrates increased R shoulder AROM flexion 160 or greater to improve tolerance to functional activities pain free.   5. Patient demonstrates increased R shoulder AROM abduction 165 or greater to improve tolerance to functional activities pain free.   6. Patient demonstrates increased strength BUE's to 4+/5 or greater to improve tolerance to functional activities pain free.   7. Patient to report improvement in overall endurance, participating in 20-30 minute walking program 3-4x week.         PLAN   Patient to be seen 2-3 x per week for 18 visits for the medically necessary treatments to include: exercises, decongestive massage, intermittent compression pump, multi layered bandaging, education in lymphedema precautions, self massage, self bandaging, and assistance in obtaining appropriate compression garment. Pt may be seen by PTA as part of the rehabilitation team. Has 14 visits left on POC that was sent in to insurance at [facility].   Next visit: have pt complete lymphedema survey and do 6 min walk test if able      Sheryl Osorio PT, CLT  8/2/2022    PT/PTA met face to face to discuss patient's treatment plan and progress towards established goals.  Patient will be seen by physical therapist every sixth visit and minimally once per month.

## 2022-08-02 DIAGNOSIS — C79.89 SECONDARY SQUAMOUS CELL CARCINOMA OF HEAD AND NECK WITH UNKNOWN PRIMARY SITE: Primary | ICD-10-CM

## 2022-08-02 DIAGNOSIS — C80.1 SECONDARY SQUAMOUS CELL CARCINOMA OF HEAD AND NECK WITH UNKNOWN PRIMARY SITE: Primary | ICD-10-CM

## 2022-08-02 DIAGNOSIS — M25.9 SHOULDER JOINT DYSFUNCTION: ICD-10-CM

## 2022-08-02 DIAGNOSIS — I89.0 LYMPHEDEMA: ICD-10-CM

## 2022-08-02 DIAGNOSIS — F32.A DEPRESSION, UNSPECIFIED DEPRESSION TYPE: Primary | ICD-10-CM

## 2022-08-04 ENCOUNTER — CLINICAL SUPPORT (OUTPATIENT)
Dept: REHABILITATION | Facility: HOSPITAL | Age: 67
End: 2022-08-04
Attending: PSYCHIATRY & NEUROLOGY
Payer: MEDICARE

## 2022-08-04 DIAGNOSIS — M25.9 SHOULDER JOINT DYSFUNCTION: Primary | ICD-10-CM

## 2022-08-04 DIAGNOSIS — I89.0 LYMPHEDEMA: ICD-10-CM

## 2022-08-04 PROCEDURE — 97110 THERAPEUTIC EXERCISES: CPT | Mod: PN,CQ

## 2022-08-04 PROCEDURE — 97140 MANUAL THERAPY 1/> REGIONS: CPT | Mod: PN,CQ

## 2022-08-04 NOTE — PROGRESS NOTES
"                                                    Physical Therapy Treatment Lymphedema Note     Patient: Seven Freire  Clinic #: [id number]    Date: 8/4/2022  Physician: Octaviano Wallace,*  Diagnosis: Lymphedema, shoulder dysfunction  Evaluation Date: 04/26/2022  Visit #/Visits authorized: 6/ 18 visits auth   Authorization Dates: 4/29/2022- 07/29/2022  Plan of Care Expiration: 07/29/2022  Re-assessment: 7/28/2022  MD order: PT Eval and Treat  PTA visit #1  Time in:  02:05 PM  Time out: 03:05 PM  Time: 60 minutes    Subjective   Felt so good after last session, that tape she put on my neck really helped too, my breathing was better and I felt like the mucus was better for a few days but It's getting thick again. The tape helped support my neck. When I swallow I can feel restrictions with the phlem in my throat, I'm suppose to gargle with baking soda mixture. I've been sleeping on an incline and not sleeping on my left side. Pt states that he thinks that insurance approved his pump; "I will bring the letter next time."     Reports no energy. Feels very fatigued. Reports increase in swelling in neck    Objective     Tissue with reduction in pliability prior to treatment, and fullness with some tenderness to touch in submental region and at midline of clavicular space.     Pt performed therapeutic exercises as follows x 15 minutes:  UBE L1.5 for 6 minutes alt direction each minute (O2 sats 90 % p. 96; pt encouraged to take slow, deep breaths and O2 sats increased to 94% p. 85)  Pulleys flexion, scaption, abduction x 10 reps ea with a 5 sec hold at the top  Scapular retractions with YTB x 10  Shoulder pulldowns with YTB x 10  Chin tucks  Cervical rotation  Cervical lateral flexion  Diaphragmatic breathing  Seated shoulder rolls   Cervical rotation/lateral flexion/and flexion/extension x 3 reps ea        Manual Lymphatic Drainage was applied to bilateral neck as follows: for 45 minutes: Pre-treatment short neck " series, cervical terminus, pre and post auricular, bilateral axillae, abdomen (educated on thoracic duct), and inguinal triangle. Posterior and lateral  cervical area, accessing watershed areas on trunk bilaterally. Lymphatouch to right and left lateral neck, submental nodes, clavicular nodes at 50-60 mmHg. Pt with initially some tenderness to anterior neck, which dissapaited with MLD. Applied rock tape in weave pattern to anteior neck/submental region. Advised pt to remove if he has any irritation with rock tape or redness. Instructed pt to wear up to 72 hours and explained how to remove tape with good understanding verbalized. Clinic out of [employer] padding, when in stock will make a night time fabricated garment to assist with congestion at neck.     PT discussed briefly with patient about the Flexitouch head/neck pneumatic compression pump from [employer] to assist in managing his Lymphedema. Provided patient this information for him and his wife to look at it online.   PT to send pt info to [employer] to check insurance coverage. (pt in agreement)    Strongly encouraged pt to begin walking program, gradually increasing distance. Pt verbalized understanding    Assessment   Patient pleased with how he felt after last treatment session, less fullness sensation, less mucus, as well as breathing better, which will last for a few days but fullness returns. Patient reports sleeping on incline daily, tolerated rock tape well for 3 days without irritation.   Patient with good tissue response to MLD, use of lympha touch to anterior neck and submental regions as well as exercises. Patients vital signs monitored throughout exercises, decreased saturation with UBE but rebounded with deep breathing.  Will continue to monitor O2 stats.     Pt has reduction in cshoulder arom, increase in girth measurements and increased weakness with nonattendance to PT treatments. Pt plans to schedule and get back to working on  therapy goals.  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 Patient completed  chemotherapy 1x a week and 30 tx of radiation at [facility] in [place]. Pt primary complain is of severe fatigue with ADLs and any standing activity.  Patient presents with lymphedema of the head/neck with fullness to submandibular region and anterior/lateral neck, increased pain, increased stiffness in the right shoulder and cervical range of motion, as well as difficulty performing overhead reaching with flexion and abduction ranges and placing the pt at rist of higher infection. This pt would benefit from skilled therapy services to address the above impairments.    Medical necessity is demonstrated by the following IMPAIRMENTS/PROBLEMS:  1. Increased Pain  2. Decreased ROM  3. Decreased BUE strength  4. Postural Imbalance  5. Decreased Tolerance to Functional Activities    The following goals were discussed with the patient and patient is in agreement with them as to be addressed in the treatment plan.     Short Term Goals: (3 weeks)  1. Decrease cervical girth by 1 cm  2. Patient will demonstrate 100% knowledge of lymphedema precautions and signs of infection.   3. Patient demonstrates independence with Postural Awareness.   4. Patient will perform self lymph drainage techniques.  5. Patient demonstrates independence with HEP.       Long Term Goals: (6 weeks)  1. Decrease cervical girth by 2 cm.  2. Patient will perform self lymph drainage techniques independently.  3. Patient to tomas/doff compression garment.   4. Patient demonstrates increased R shoulder AROM flexion 160 or greater to improve tolerance to functional activities pain free.   5. Patient demonstrates increased R shoulder AROM abduction 165 or greater to improve tolerance to functional activities pain  free.   6. Patient demonstrates increased strength BUE's to 4+/5 or greater to improve tolerance to functional activities pain free.   7. Patient to report improvement in overall endurance, participating in 20-30 minute walking program 3-4x week.         PLAN   Patient to be seen 2-3 x per week for 18 visits for the medically necessary treatments to include: exercises, decongestive massage, intermittent compression pump, multi layered bandaging, education in lymphedema precautions, self massage, self bandaging, and assistance in obtaining appropriate compression garment. Pt may be seen by PTA as part of the rehabilitation team. Has 13 visits left on POC that was sent in to insurance at [facility].   Next visit: have patient do a 6 min walk test    Rochelle Leslie PTA, CLT  8/4/2022    PT/PTA met face to face to discuss patient's treatment plan and progress towards established goals.  Patient will be seen by physical therapist every sixth visit and minimally once per month.

## 2022-08-05 ENCOUNTER — OFFICE VISIT (OUTPATIENT)
Dept: PSYCHIATRY | Facility: CLINIC | Age: 67
End: 2022-08-05
Payer: MEDICARE

## 2022-08-05 DIAGNOSIS — F32.A DEPRESSION, UNSPECIFIED DEPRESSION TYPE: ICD-10-CM

## 2022-08-05 DIAGNOSIS — F33.41 RECURRENT MAJOR DEPRESSIVE DISORDER, IN PARTIAL REMISSION: Primary | ICD-10-CM

## 2022-08-05 PROCEDURE — 90791 PSYCH DIAGNOSTIC EVALUATION: CPT | Mod: S$GLB,,, | Performed by: PSYCHOLOGIST

## 2022-08-05 PROCEDURE — 90791 PR PSYCHIATRIC DIAGNOSTIC EVALUATION: ICD-10-PCS | Mod: S$GLB,,, | Performed by: PSYCHOLOGIST

## 2022-08-05 PROCEDURE — 4010F PR ACE/ARB THEARPY RXD/TAKEN: ICD-10-PCS | Mod: CPTII,S$GLB,, | Performed by: PSYCHOLOGIST

## 2022-08-05 PROCEDURE — 4010F ACE/ARB THERAPY RXD/TAKEN: CPT | Mod: CPTII,S$GLB,, | Performed by: PSYCHOLOGIST

## 2022-08-05 NOTE — Clinical Note
Good afternoon,  Very pleasant gentleman requesting medication management. Psychiatrist retired and oncology now covering meds. Managed at this point but prior to recent addition of Prozac was starting to trend down.  Thanks,  NTT

## 2022-08-05 NOTE — Clinical Note
Recent addition of Prozac has done wonders. Coping w/ grief of loss of daughter (and recent death of older sister). Long family hx of dep w/ referral for med management in [place]. Continuing to follow. Thanks for the referral.

## 2022-08-05 NOTE — PROGRESS NOTES
PSYCHO-ONCOLOGY INTAKE    Diagnostic Interview - CPT 56113    Date: 8/5/2022  Site: [facility]    Evaluation Length (direct face-to-face time):  45 minutes    This includes face to face time and non-face to face time preparing to see the patient, obtaining and/or reviewing separately obtained history, documenting clinical information in the electronic or other health record, independently interpreting results and communicating results to the patient/family/caregiver, or care coordinator.     Referral Source: Juanita Sellers MD   Oncologist: Octaviano Roper MD   PCP: Ewa Jaramillo MD    Clinical status of patient: Outpatient    Seven Freire, a 67 y.o. male, seen for initial evaluation visit.    Seven Freire reviewed and agreed to informed consent and the limits of confidentiality.    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 The patient engaged in regular activity prior to illness The patient is actively working to return to baseline exercise tolerance.   Advanced directives:No     Family History:   Psychiatric illness: Yes, maternal/paternal depression throughout family tree (especially mother, brother, 2 sisters, and daughter-Dee)     Alcohol/Drug Abuse: Yes     Suicide: Yes, multiple family members (most recently  daughter-2018)      Past Psychiatric History:   Inpatient treatment: Yes, x2 most recent at [facility] 2 years ago (2 week stay). Prior at [facility].     Outpatient treatment: Yes, throughout lifespan, most recent w/ Dr. Colindres (Saw for med management 4-5 years, retired 2018)     Prior substance abuse treatment: Yes     Suicide Attempts: Yes, requiring inpt stay     Psychotropic Medications:  Current: Prozac, Adderall       Past: multiple psychotropics (could not recall names)    Current medications as per below, allergies reviewed in chart.    Current Outpatient Medications   Medication    albuterol (PROVENTIL/VENTOLIN HFA) 90 mcg/actuation inhaler    amLODIPine (NORVASC) 5 MG tablet    buPROPion (WELLBUTRIN XL) 150 MG TB24 tablet    clopidogreL (PLAVIX) 75 mg tablet    dexAMETHasone (DECADRON) 4 MG Tab    DUEXIS 800-26.6 mg Tab    ezetimibe (ZETIA) 10 mg tablet    HYDROcodone-acetaminophen (NORCO) 5-325 mg per tablet    HYDROmorphone (DILAUDID) 2 MG tablet    irbesartan (AVAPRO) 300 MG tablet    LORazepam (ATIVAN) 1 MG tablet    magnesium oxide (MAG-OX) 400 mg (241.3 mg magnesium) tablet    nicotine (NICODERM CQ) 14 mg/24 hr    ondansetron (ZOFRAN-ODT) 4 MG TbDL    oxyCODONE (ROXICODONE) 10 mg Tab immediate release tablet    traZODone (DESYREL) 100 MG tablet    varenicline (CHANTIX) 0.5 MG Tab    vitamin D (VITAMIN D3) 1000 units Tab    vitamin E 400 UNIT capsule     No current facility-administered medications for this visit.              Social situation/Stressors: Seven Freire lives with his wife in [place], [place].  He is retired but previously worked as a  for [employer]. He retired 4 years ago.    Seven Freire has been  49 years and his partner is Nohemi.   The patient reports good social support from friends/family/community.  Seven Freire is an active Hindu.  Seven Freire's hobbies include gardening and fishing.  Additional stressors:  recent loss of older sister (last week-described her as a mother figure), familial hx of severe depression, loss of daughter to suicide (2018)    Strengths:Able to vocalize needs, Motivation, readiness for change, Vocational interests, hobbies and/or talents and Interpersonal relationships and supports available - family, relatives, friends  Liabilities: Complicated medical illness    Current Evaluation:     Mental Status Exam: Seven Freire arrived promptly for the assessment session. His wife, Nohemi, Was present for the evaluation w/ the permission of the pt. The patient was fully cooperative throughout the interview and was an adequate historian   Appearance: age appropriate, casually  dressed, adequately  groomed  Behavior/Cooperation: friendly and cooperative  Speech: normal in rate, volume, and tone and appropriate quality, quantity and organization of sentences  Mood: steady  Affect: mood congruent and appropriate  Thought Process: goal-directed, logical  Thought Content: normal, no suicidality, no homicidality, delusions, or paranoia;did not appear to be responding to internal stimuli during the interview.   Orientation: grossly intact  Memory: grossly intact  Attention Span/Concentration: Attends to interview without distraction; reports no difficulty  Fund of Knowledge: average  Estimate of Intelligence: average from verbal skills and history  Cognition: grossly intact  Insight: patient has awareness of illness; good insight into own behavior and behavior of others  Judgment: the patient's behavior is adequate to circumstances      History of present illness:    Oncology History    No history exists.         Seven Freire has adjusted to illness with slight difficulty primarily through passive coping strategies. He states that he is not as concerned for his cancer as he is for his psychological wellbeing (though he notes that he is currently coping well w/ his depression w/ the recent addition of Prozac  to his medication regimen). He expresses an interest in establishing w/ a prescribing provider to maintain current mood stability. He has engaged in appropriate information gathering.  The patient has good family/friend support.  His support system is coping well with the diagnosis/treatment/prognosis but may benefit from additional support on an as needed basis. Illness-related psychosocial stressors include difficulty meeting family responsibilities, changes in ability to engage in leisure activities and exacerbated fatigue.  The patient has a excellent partnership with his [facility] treatment team. The patient reports the following barriers to cancer care:none.   Symptoms:   · Mood: depressed mood, diminished interest, weight loss, fatigue, worthlessness/guilt and tearfulness;  prior depression:throughout lifespan; no SI/HI  · Anxiety: worry about getting set up w/ a prescribing provider; prior anxiety:typically secondary to depression  · Substance abuse: denied  · Cognitive functioning: denied  · Health behaviors: noncontributory  · Sleep:  restful sleep  and no concerns , no sleep onset difficulty  and no sleep maintenance difficulty         Assessment - Diagnosis - Goals:       ICD-10-CM ICD-9-CM   1. Recurrent major depressive disorder, in partial remission  F33.41 296.35   2. Depression, unspecified depression type  F32.A 311         Plan:individual psychotherapy and consult psychiatrist for medication evaluation    Summary and Recommendations  Seven Freire is a 67 y.o. male referred by Juanita Sellers MD for psychological evaluation and treatment.  Mr. Freire appears to be having slight difficulty coping with his diagnosis and proposed treatment course. However, reports a psychiatric hx significant for multiple inpt stays due to depression and anxiety towards finding a new prescribing mental health provider in the near future.  Patient was referred to Psychiatry for medication  evaluation/management. Mood protective strategies during cancer treatment were discussed.  He is interested in individual therapy for cancer coping skills and will follow up with me for that purpose. He was encouraged to engage in behavioral activation strategies explored in intake in the pursuit of adaptive/value driven coping.    Return to clinic: 1 month    GOALS:   Referral to Psychiatry for medication evaluation   Behavioral activation  Continued coping w/ cancer               Jose De Jesus Padilla Psy.D.  Clinical Psychologist  [place] License #[id number]  [place] License #[id number]

## 2022-08-10 ENCOUNTER — CLINICAL SUPPORT (OUTPATIENT)
Dept: REHABILITATION | Facility: HOSPITAL | Age: 67
End: 2022-08-10
Attending: PSYCHIATRY & NEUROLOGY
Payer: MEDICARE

## 2022-08-10 DIAGNOSIS — M25.9 SHOULDER JOINT DYSFUNCTION: ICD-10-CM

## 2022-08-10 DIAGNOSIS — I89.0 LYMPHEDEMA: ICD-10-CM

## 2022-08-10 DIAGNOSIS — C80.1 SECONDARY SQUAMOUS CELL CARCINOMA OF HEAD AND NECK WITH UNKNOWN PRIMARY SITE: ICD-10-CM

## 2022-08-10 DIAGNOSIS — C79.89 SECONDARY SQUAMOUS CELL CARCINOMA OF HEAD AND NECK WITH UNKNOWN PRIMARY SITE: ICD-10-CM

## 2022-08-10 PROCEDURE — 97110 THERAPEUTIC EXERCISES: CPT | Mod: PN

## 2022-08-10 PROCEDURE — 97140 MANUAL THERAPY 1/> REGIONS: CPT | Mod: PN

## 2022-08-10 NOTE — PROGRESS NOTES
Physical Therapy Treatment Lymphedema Note     Patient: Seven Freire  Clinic #: [id number]    Date: 8/10/2022  Physician: Octaviano Wallace,*  Diagnosis: Lymphedema, shoulder dysfunction  Evaluation Date: 04/26/2022  Visit #/Visits authorized: 7/ 18 visits auth   Authorization Dates: 4/29/2022- 07/29/2022  Plan of Care Expiration: 07/29/2022  Re-assessment: 7/28/2022  MD order: PT Eval and Treat  PTA visit #0  Time in:  02:05 PM  Time out: 03:05 PM  Time: 60 minutes    Subjective   Feeling weak and tired, reports severe depression.   Yesterday neck was looking better, but it filled back up again.     Objective     Tissue with reduction in pliability prior to treatment, and fullness with some tenderness to touch in submental region and at midline of clavicular space.     Pt performed therapeutic exercises as follows x 15 minutes:  6MWT ambulated 1508 feet with O2 drop to 82% with sitting rest increased to 93% x 2 minutes  Shoulder flexion in supine 20 reps  Trunk and cervical rot opp directions x 10 reps in supine  Diaphragmatic breathing  Chin tucks  Mandibular depression   Cervical rotation  Cervical lateral flexion    deferred  UBE L1.5 for 6 minutes alt direction each minute (O2 sats 90 % p. 96; pt encouraged to take slow, deep breaths and O2 sats increased to 94% p. 85)  Pulleys flexion, scaption, abduction x 10 reps ea with a 5 sec hold at the top  Scapular retractions with YTB x 10  Shoulder pulldowns with YTB x 10  Seated shoulder rolls   Cervical rotation/lateral flexion/and flexion/extension x 3 reps ea        Manual Lymphatic Drainage was applied to bilateral neck as follows: for 45 minutes: Pre-treatment short neck series, cervical terminus, pre and post auricular, bilateral axillae, abdomen (educated on thoracic duct), and inguinal triangle. Posterior and lateral  cervical area, accessing watershed areas on trunk bilaterally. Lymphatouch to right and  left lateral neck, submental nodes, clavicular nodes at 50-60 mmHg. Pt with initially some tenderness to anterior neck, which dissapaited with MLD. Applied rock tape in weave pattern to anteior neck/submental region. Advised pt to remove if he has any irritation with rock tape or redness. Instructed pt to wear up to 72 hours and explained how to remove tape with good understanding verbalized. Clinic out of [employer] padding, when in stock will make a night time fabricated garment to assist with congestion at neck.     PT discussed briefly with patient about the Flexitouch head/neck pneumatic compression pump from [employer] to assist in managing his Lymphedema. Provided patient this information for him and his wife to look at it online.   PT to send pt info to [employer] to check insurance coverage. (pt in agreement)    Strongly encouraged pt to begin walking program, gradually increasing distance. Pt verbalized understanding    Assessment   Patient pleased with how he felt after last treatment session, less fullness sensation, less mucus, as well as breathing better, which will last for a few days but fullness returns. Patient reports sleeping on incline daily, tolerated rock tape well for 3 days without irritation.   Patient with good tissue response to MLD, use of lympha touch to anterior neck and submental regions as well as exercises. Patients vital signs monitored throughout exercises, decreased saturation with UBE but rebounded with deep breathing.  Will continue to monitor O2 stats.     Pt has reduction in cshoulder arom, increase in girth measurements and increased weakness with nonattendance to PT treatments. Pt plans to schedule and get back to working on therapy goals.  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 Patient completed  chemotherapy 1x  a week and 30 tx of radiation at [facility] in [place]. Pt primary complain is of severe fatigue with ADLs and any standing activity.  Patient presents with lymphedema of the head/neck with fullness to submandibular region and anterior/lateral neck, increased pain, increased stiffness in the right shoulder and cervical range of motion, as well as difficulty performing overhead reaching with flexion and abduction ranges and placing the pt at rist of higher infection. This pt would benefit from skilled therapy services to address the above impairments.    Medical necessity is demonstrated by the following IMPAIRMENTS/PROBLEMS:  1. Increased Pain  2. Decreased ROM  3. Decreased BUE strength  4. Postural Imbalance  5. Decreased Tolerance to Functional Activities    The following goals were discussed with the patient and patient is in agreement with them as to be addressed in the treatment plan.     Short Term Goals: (3 weeks) IN PROGRESS  1. Decrease cervical girth by 1 cm  2. Patient will demonstrate 100% knowledge of lymphedema precautions and signs of infection.   3. Patient demonstrates independence with Postural Awareness.   4. Patient will perform self lymph drainage techniques.  5. Patient demonstrates independence with HEP.       Long Term Goals: (6 weeks)  1. Decrease cervical girth by 2 cm.  2. Patient will perform self lymph drainage techniques independently.  3. Patient to tomas/doff compression garment.   4. Patient demonstrates increased R shoulder AROM flexion 160 or greater to improve tolerance to functional activities pain free.   5. Patient demonstrates increased R shoulder AROM abduction 165 or greater to improve tolerance to functional activities pain free.   6. Patient demonstrates increased strength BUE's to 4+/5 or greater to improve tolerance to functional activities pain free.   7. Patient to report improvement in overall endurance, participating in 20-30 minute walking program  3-4x week.         PLAN   Patient to be seen 2-3 x per week for 18 visits for the medically necessary treatments to include: exercises, decongestive massage, intermittent compression pump, multi layered bandaging, education in lymphedema precautions, self massage, self bandaging, and assistance in obtaining appropriate compression garment. Pt may be seen by PTA as part of the rehabilitation team. Has 12 visits left on POC that was sent in to insurance at [facility].       Laurence Bahena, PT, CLT  8/10/2022    PT/PTA met face to face to discuss patient's treatment plan and progress towards established goals.  Patient will be seen by physical therapist every sixth visit and minimally once per month.

## 2022-08-11 ENCOUNTER — CLINICAL SUPPORT (OUTPATIENT)
Dept: REHABILITATION | Facility: HOSPITAL | Age: 67
End: 2022-08-11
Attending: PSYCHIATRY & NEUROLOGY
Payer: MEDICARE

## 2022-08-11 ENCOUNTER — TELEPHONE (OUTPATIENT)
Dept: HEMATOLOGY/ONCOLOGY | Facility: CLINIC | Age: 67
End: 2022-08-11
Payer: MEDICARE

## 2022-08-11 DIAGNOSIS — I89.0 LYMPHEDEMA: ICD-10-CM

## 2022-08-11 DIAGNOSIS — M25.9 SHOULDER JOINT DYSFUNCTION: Primary | ICD-10-CM

## 2022-08-11 PROCEDURE — 97110 THERAPEUTIC EXERCISES: CPT | Mod: PN

## 2022-08-11 PROCEDURE — 97140 MANUAL THERAPY 1/> REGIONS: CPT | Mod: PN

## 2022-08-11 NOTE — TELEPHONE ENCOUNTER
I spoke with Nohemi and scheduled [facility] for another PT appt this week per Laurence Bahena's request. Nohemi voiced acceptance of 3pm today.

## 2022-08-11 NOTE — PROGRESS NOTES
Physical Therapy Treatment Lymphedema Note     Patient: Seven Freire  Clinic #: [id number]    Date: 8/12/2022  Physician: Octaviano Wallace,*  Diagnosis: Lymphedema, shoulder dysfunction  Evaluation Date: 04/26/2022  Visit #/Visits authorized: 7/ 18 visits auth   Authorization Dates: 4/29/2022- 07/29/2022  Plan of Care Expiration: 07/29/2022  Re-assessment: 7/28/2022  MD order: PT Eval and Treat  PTA visit #0  Time in:  02:05 PM  Time out: 03:05 PM  Time: 60 minutes    Subjective   Feeling weak and tired, reports severe depression.  Neck responding to treatment. Pt able to wear fabricated komprex padding with stockinet while asleep.   Wife reports hearing from a pump compnay called [employer], provided pt and wife with education via video on flexitouch pump through [employer].  Objective     Noted reduction in submental girth and improved pliability with tape secured in place from yesterdays tx. No adverse reaction to tape noted.     Pt performed therapeutic exercises as follows x 15 minutes:  UBE L1.5 for 6 minutes alt direction each minute (O2 sats 92 % p. 90; pt encouraged to take slow, deep breaths and O2 sats increased to 95% p. 85)  Pulleys flexion, scaption, abduction x 10 reps ea with a 5 sec hold at the top  Scapular retractions with RTB x 30  Gentle corner wall stretch 30 sec x 3 trials  Manual stretches to R pectoral region in supine and R shoulder flexion/ scaption as tolerated  shoulder ext with red tband 30 reps in standing B  Provided pt with red tband for home use.     deferred  6MWT ambulated 1508 feet with O2 drop to 82% with sitting rest increased to 93% x 2 minutes  Shoulder flexion in supine 20 reps  Trunk and cervical rot opp directions x 10 reps in supine  Diaphragmatic breathing  Chin tucks  Mandibular depression   Cervical rotation  Cervical lateral flexion      Shoulder pulldowns with YTB x 10  Seated shoulder rolls   Cervical  rotation/lateral flexion/and flexion/extension x 3 reps ea        Manual Lymphatic Drainage was applied to bilateral neck as follows: for 45 minutes: Pre-treatment short neck series, cervical terminus, pre and post auricular, bilateral axillae, abdomen (educated on thoracic duct), and inguinal triangle. Posterior and lateral  cervical area, accessing watershed areas on trunk bilaterally. Lymphatouch to right and left lateral neck, submental nodes, clavicular nodes at 50-60 mmHg. Pt with initially some tenderness to anterior neck, which dissapaited with MLD. Applied rock tape in weave pattern to anteior neck/submental region. Advised pt to remove if he has any irritation with rock tape or redness. Instructed pt to wear up to 72 hours and explained how to remove tape with good understanding verbalized. Reviewed self MLD techniques with patient for cervical terminus, upper cervical clearing and axilla.    PT discussed briefly with patient about the Flexitouch head/neck pneumatic compression pump from [employer] to assist in managing his Lymphedema. Provided patient this information for him and his wife to look at it online.   PT to send pt info to [employer] to check insurance coverage. (pt in agreement)    Strongly encouraged pt to begin walking program, gradually increasing distance. Pt verbalized understanding    Assessment   Patient pleased with how he felt after last treatment session, less fullness sensation, less mucus, as well as breathing better, which will last for a few days but fullness returns. Patient reports sleeping on incline daily, tolerated rock tape well for 3 days without irritation.   Patient with good tissue response to MLD, use of lympha touch to anterior neck and submental regions as well as exercises. Patients vital signs monitored throughout exercises, decreased saturation with UBE but rebounded with deep breathing.  Will continue to monitor O2 stats.     Pt has reduction in  cshoulder arom, increase in girth measurements and increased weakness with nonattendance to PT treatments. Pt plans to schedule and get back to working on therapy goals.  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 Patient completed  chemotherapy 1x a week and 30 tx of radiation at [facility] in [place]. Pt primary complain is of severe fatigue with ADLs and any standing activity.  Patient presents with lymphedema of the head/neck with fullness to submandibular region and anterior/lateral neck, increased pain, increased stiffness in the right shoulder and cervical range of motion, as well as difficulty performing overhead reaching with flexion and abduction ranges and placing the pt at rist of higher infection. This pt would benefit from skilled therapy services to address the above impairments.    Medical necessity is demonstrated by the following IMPAIRMENTS/PROBLEMS:  1. Increased Pain  2. Decreased ROM  3. Decreased BUE strength  4. Postural Imbalance  5. Decreased Tolerance to Functional Activities    The following goals were discussed with the patient and patient is in agreement with them as to be addressed in the treatment plan.     Short Term Goals: (3 weeks) IN PROGRESS  1. Decrease cervical girth by 1 cm  2. Patient will demonstrate 100% knowledge of lymphedema precautions and signs of infection.   3. Patient demonstrates independence with Postural Awareness.   4. Patient will perform self lymph drainage techniques.  5. Patient demonstrates independence with HEP.       Long Term Goals: (6 weeks)  1. Decrease cervical girth by 2 cm.  2. Patient will perform self lymph drainage techniques independently.  3. Patient to tomas/doff compression garment.   4. Patient demonstrates increased R shoulder AROM flexion 160 or greater to improve tolerance  to functional activities pain free.   5. Patient demonstrates increased R shoulder AROM abduction 165 or greater to improve tolerance to functional activities pain free.   6. Patient demonstrates increased strength BUE's to 4+/5 or greater to improve tolerance to functional activities pain free.   7. Patient to report improvement in overall endurance, participating in 20-30 minute walking program 3-4x week.         PLAN   Patient to be seen 2-3 x per week for 18 visits for the medically necessary treatments to include: exercises, decongestive massage, intermittent compression pump, multi layered bandaging, education in lymphedema precautions, self massage, self bandaging, and assistance in obtaining appropriate compression garment. Pt may be seen by PTA as part of the rehabilitation team. Has 11 visits left on POC that was sent in to insurance at [facility].   May benefit from use of Jorge Fascioplasty. Would be excellent candidate for Flexitouch for head and neck.     Laurence Bahena, PT, CLT  08/11/2022    PT/PTA met face to face to discuss patient's treatment plan and progress towards established goals.  Patient will be seen by physical therapist every sixth visit and minimally once per month.

## 2022-08-11 NOTE — PATIENT INSTRUCTIONS
Self Manual Lymphatic Drainage Face, Head and Neck    For patients who were treated for head and neck cancer with:    Surgery to remove a tumour(s) or lymph nodes   Radiation therapy    Read this resource to learn:   what is your lymphatic system   what lymphatic self-massage is and why it is important   how to do lymphatic self-massage step-by-step    What is your lymphatic system?  Your lymphatic system filters and removes extra fluid and waste from your body. It plays an important role in your immune function. Your lymphatic system is made up of many lymph nodes that are connected together by lymph vessels.     Your lymph nodes are bean-shaped organs that are found all over your body. Large groups or chains of lymph nodes can be found in your neck, under your arms and in your groin (see the image below).     Surgery or radiation to your lymph nodes damage your lymph nodes and vessels. This damage prevents fluid from flowing well and causes swelling. Swelling from damaged lymph nodes and vessels is called lymphedema    What is lymphatic self-massage and why is it important?  Lymphatic self-massage is a gentle skin massage where the skin is gently stretched and released along lymph pathways. Lymphatic self-massage helps move extra fluid from swollen areas damaged by cancer treatment. This extra fluid can be moved into an area where the lymph nodes are working well.    Lymphatic self-massage can help move extra fluid away from:    areas of your face that have had treatment   areas of your neck that have had treatment    Lymphatic self-massage can help to move extra fluid to:    lymph vessels and lymph nodes in areas of your face or neck not affected by treatment   lymph vessels and lymph nodes in your underarms    How to do lymphatic self-massage   Keep your hands soft and relaxed. Use a light pressure on your skin. The pressure of your hands should be just enough to gently stretch the skin. Only stretch the skin as  far as it can go naturally without causing pain. Release the pressure and let your skin come back as it was. If you can feel your muscles under your skin you are pressing too hard.   Use the flat part (palms) of your hands instead of your fingertips. Your palms allow more contact with the skin to stimulate (pump) the lymph vessels.   Massage towards areas of your body that have not been treated for cancer such as your chest and underarms.   Make sure you are in a comfortable position. You can self-massage while sitting, standing or lying down. Choose a position that is most comfortable for you.   Massage when you are comfortably warm or when you are in a nice, warm room. If your muscles are warm, they are more flexible.   Do self-massage regularly. You can use self-massage as time to relax, breathe, and take care of yourself.    What to avoid   Do not strain your shoulders, neck, arm or hand   Do not self-massage if it causes pain   Do not do self-massage if you have an infection in the area that has swelling    Do not do self-massage if you think you have an infection. Infections can occur in your head, neck or face where your lymph nodes have been removed or you have had radiation.     Signs of an infection may include:    Swelling and redness of the skin. This redness can quickly spread.   Pain or soreness in your head, face or neck where you had treatment.   Warm or hot feeling in your head, face or neck where you had treatment.   Fever or chills.   Feeling unwell.    If you think you have an infection go to:   Your family doctor   Walk-in Clinic   Urgent Care Clinic   Emergency department    If you have had an infection, only start self-massage again when you have finished your antibiotics (medicine). Or if your doctor says it is okay to start again.    Try different ways to make self-massage a part of your routine. Try self-massage while you are watching TV or having a shower so it does not take time away from  your day. Try to make self-massage a time for yourself. Or make it a part of your routine for relaxing    Below are the steps for doing a lymphatic self-massage. Follow the instructions closely. Talk to your health care team if you have any questions.    Deep Breathing    Deep breathing is an important part of your self-care. Deep breathing works like a pump in your body. This pump helps the lymph nodes and vessels move fluid. You can practice deep breathing at any time!   What to do:    Place the palms of both hands on your stomach.   Take a deep breathe in through your nose until your stomach pushes against your hands.   Breathe out slowly through pursed lips (like you are blowing out candles). Then let your stomach go flat.   Repeat 5 times. Take a short rest between each breath so you do not feel dizzy       2. Stretch and release the skin at the front of your neck    This motion helps lymph fluid drain back to your heart. You can massage one side at a time or both sides at the same time. You may find it easier to cross your hands if you are doing both at the same time.   What to do:    Place the flats of your 2nd and 3rd fingers on either side of your neck just above your collarbone.   Massage down and inwards toward your collarbone. Always keep your fingers above your collarbone. Start massage on the area of your neck that is close to your shoulder and gently stretch the skin towards the middle of your neck.   Gently stretch the skin just as far as it goes without pain. Then let go of the skin.   This massage will look like 2 letter Js facing one another.   Repeat 10-15 times on each side.     3. Prepare your underarm lymph nodes    This prepares the lymph vessels and nodes under your arm to take in lymph fluid from your face and neck. Place your arm in a comfortable position. Your arm should be slightly raised and supported. You may want to place your arm on an armrest or  table for comfort.   What to do:     Place your palm of your opposite hand against your underarm.   Gently pull up and in toward your body, then release. Pause for a moment and then start again. Repeat 10-15 times   Do the same pumping on your other underarm. Repeat 10-15 times.       4. Stretch and release the skin from your chest to your underarm    Place you hand on your collarbone. Move your hand down your chest in half circles toward your underarm. Massage your chest to help reduce swelling. This massage will move the lymph fluid from your neck and chest to your underarm lymph vessels and nodes.   What to do:    Place your hand over your collar bone   Gently stretch the skin (not muscles) down your chest and towards your underarm. Then let go of the skin. Pause for a moment. Now repeat this massage stroke as you gradually move your hand down your chest towards your underarm.   Repeat this massage 10-15 times.     5. Stretch and release the skin from the front of your neck to your chest    Massage the front of your neck to help reduce swelling. This massage will move the lymph fluid from your neck to your chest. Place your hand on the front of your neck where you have swelling. Move your hand down your neck towards your collar bone and chest.     What to do:    Place your hand over the swelling at the front of your neck   Gently stretch the skin (not muscles) towards your collarbone. Then let go of the skin. Pause for a moment. Now repeat this massage stroke as you gradually move your hand past your collar bone and down your chest.   Repeat this massage 10-15 times.     6. Stretch and release the skin at the side of your neck    Massage the side of your neck to lower or prevent the swelling in your face and neck. This massage helps stimulate (pump) the vessels at the side of your neck. Do not massage both sides of your neck at the same time if you have radiation treatment.   What to do:    Place your hands flat on the side of your neck   Gently  stretch the skin away from your face and down. Then release.   Massage your neck and side of the face in a slow and gentle way.   Repeat 10-15 times.     7. Stretch and release the skin on the back of your neck    Massage the back of your neck to lower or prevent the swelling in your face and neck. This massage helps stimulate (pump) the vessels at the back of your neck.   What to do:    Place the palms of your hands on the back of your neck, just belowyour hairline.   Stretch the skin towards your spine and then down towards your back.   Repeat 10-15 times.       8. Massage your scar     Massage your scar. This massage is only if you have had surgery. Do not massage until three weeks after surgery. Do not massage until all staples and clips have been removed. Your scar may feel very sensitive, tight or itchy. Scar massage will help reduce these feelings.This massage helps soften the scar and allows better blood flow to the area. Scar massage should always be pain-free. Do not use oil while doing the scar massage. Apply any lotions or oils after the massage.    What to do:    Place the palm of your hand over the scar.    Move up and down in a zigzag pattern or [place] pattern along the scar. See pictures above for help.   Apply firm but gentle pressure while moving along the scar. Try to move the skin.   If possible, gently lift the skin along the scar.   Repeat 5 or 6 times on the scar.   Now place your fingertips just above the scar. Gently stretch the skin away from the scar and release.   Repeat 5 times.   Place your fingertips below the scar. Gently stretch the skin away from the scar. Then release the skin.   Repeat 5 times. If your skin and swollen tissue in your neck or face feels hard, ask your therapist to show you gentle kneading techniques to help soften the firm tissue.    Here is how you will soften the firm tissue on your own at home:   Gently place the pads of your fingers on the tissue or skin that  feels firm or hard.   Gently press down with the pads of your fingers and let go. As you release the pressure, move your fingers down slightly.    Repeat 10-15 times in one area. Move to another area and repeat.     9. Massage for face swelling     Your therapist will draw arrows on the image. This will show you the   way you should do your self-massage.     10. Massage for swelling inside the mouth    Your therapist will draw arrows on the image. This will show you the way you should do your self-massage.   Do not massage inside your mouth if you have any sores   or cuts or pain.   Make sure your hand is clean before you start to massage inside your mouth.     11. Stretch and release the skin at the front of your neck    This motion helps lymph fluid drain back to your heart. You can massage 1 side at a time or both sides at the same time. You may find it easier to cross your hands if you are doing both at the same time.   What to do:    Place the flats of your 2nd and 3rd fingers on either side of your neck just above your collarbone.   Massage down and inwards toward your collarbone. Always keep your fingers above your collarbone. Start massage on the area of your neck that is close to your shoulder and gently stretch the skin towards the middle of your neck.   Gently stretch the skin just as far as it goes without pain. Then let go of the skin.   This massage will look like 2 letter Js facing one another.   Repeat 15 times on each side.

## 2022-08-15 ENCOUNTER — DOCUMENTATION ONLY (OUTPATIENT)
Dept: ADMINISTRATIVE | Facility: OTHER | Age: 67
End: 2022-08-15
Payer: MEDICARE

## 2022-08-16 ENCOUNTER — TELEPHONE (OUTPATIENT)
Dept: HEMATOLOGY/ONCOLOGY | Facility: CLINIC | Age: 67
End: 2022-08-16
Payer: MEDICARE

## 2022-08-16 NOTE — TELEPHONE ENCOUNTER
I spoke with Nohemi and cancelled Seven' PT for today, she said she got his appts mixed up. We scheduled 2 more appts for next week!      ----- Message from Dayana Hutchins sent at 8/16/2022 12:57 PM CDT -----  Type: Needs Medical Advice  Who Called: Nohemi (spouse)  Symptoms (please be specific)  How long has patient had these symptoms:    Pharmacy name and phone #:    Best Call Back Number:   Additional Information: Nohemi is requesting a call back to reschedule her  PT appt. on 8/16.          Rolling walker will be delivered to bedside prior to discharge.

## 2022-08-16 NOTE — PROGRESS NOTES
08/15/2022      Hudson Seals M.D.   [address] [place] [address]      RE: Seven Freire    MR#:  [id number]    :  1955       DX: 1.  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  Right modified radical neck dissection in 2022.  Unknown head and neck primary.  Group pathologic stage IVB, cT0 pN3b (5/39 right cervical lymph nodes were positive, including major extracapsular extension to the sternocleidomastoid muscle, internal jugular vein, adventitia of the common carotid artery and the right pharyngeal constrictors muscles; largest lymph node was 3.1 cm) cM0.  PD-L1 positive.       2.      My 2022 evaluation suggests severe depression.      Dear Hudson:     Your patient returns three months after completing postoperative irradiation delivered with concurrent cisplatin.  I saw the patient on , and at that time the patient was severely depressed.     On , the patient was seen by Psychiatry.  He has also been seen by Physical Therapy for his neck lymphedema.     On , a PET-CT scan was performed.  I have reviewed the images, as well as read the radiologist's interpretation.  There are no abnormalities in the head and neck region.  There is some hypermetabolic activity in the L2 vertebral body; however, there is  less hypermetabolic activity  than the  PET-CT scan, and similar to the 2022 PET-CT scan, there is no CT correlate.  I do not think there is evidence for cancer in the L2 vertebral body.     Symptomatically, the patient is doing a lot better, than when I saw him on .  His depression is much better controlled.  He does not mention pain.  He is doing antitrismus exercises and using fluoride for dental protection.     Physical examination shows that his neck lymphedema is improved.  There is no cervical lymphadenopathy.  Examination of the oral cavity and  oropharynx is unremarkable.     Fiberoptic examination shows moderate right arytenoid edema, but he still has a good airway.  Other than this right arytenoid edema, there are no laryngeal lesions.  There are also no hypopharynx, oropharynx or nasopharynx lesions.     Assessment and Plan:  Symptomatically, the patient is doing well and there is no evidence for tumor recurrence on history, physical exam or current PET-CT scan.  His depression is doing quite a bit better.  The patient will see Dr. Haddad on August 18.  The patient will see the psychiatrist, Dr. Padilla, on September 9.  I will see the patient in October, and I think the patient sees Dr. Sellers on December 8.  Just to lay this question to rest, I will be repeating the PET-CT scan in six months and that is to confirm that there are no abnormalities that would correlate with some hypermetabolic activity in the L2 vertebral body.   35 minutes was spent on this follow up, of which 10 minutes was spent in record review, 20 minutes was spent directly with the patient and his wife, and over five minutes was spent in documentation.     I appreciate the opportunity to consult on your patients.  Please call me with any question or comments.       Sincerely,          Electronically Signed By:  Christian Wallace M.D.                                                  /MedQ  DD:  08/15/2022  DT:  08/15/2022  Job #:  [id number]    CC:  Kalpesh Haddad M.D., [address] [place] [address], Fax: [phone]        Juanita Sellers M.D., [address], [place] [address], Fax: [phone]        Ewa Jaramillo M.D., [address] [place] [id number], Fax: [phone]

## 2022-08-18 ENCOUNTER — CLINICAL SUPPORT (OUTPATIENT)
Dept: REHABILITATION | Facility: HOSPITAL | Age: 67
End: 2022-08-18
Attending: OTOLARYNGOLOGY
Payer: MEDICARE

## 2022-08-18 DIAGNOSIS — M25.9 SHOULDER JOINT DYSFUNCTION: Primary | ICD-10-CM

## 2022-08-18 DIAGNOSIS — I89.0 LYMPHEDEMA: ICD-10-CM

## 2022-08-18 PROCEDURE — 97140 MANUAL THERAPY 1/> REGIONS: CPT | Mod: PN,CQ

## 2022-08-18 PROCEDURE — 97110 THERAPEUTIC EXERCISES: CPT | Mod: PN,CQ

## 2022-08-19 NOTE — PROGRESS NOTES
"                                                    Physical Therapy Treatment Lymphedema Note     Patient: Seven Freire  Clinic #: [id number]    Date: 8/18/2022  Physician: Octaviano Wallace,*  Diagnosis: Lymphedema, shoulder dysfunction  Evaluation Date: 04/26/2022  Visit #/Visits authorized: 8/ 18 visits auth   Authorization Dates: 4/29/2022- 07/29/2022  Plan of Care Expiration: 07/29/2022  Re-assessment: 7/28/2022  MD order: PT Eval and Treat  PTA visit #1  Time in:  02:05 PM  Time out: 03:05 PM  Time: 60 minutes    Subjective   Feeling swollen today; mixed up appointment time last session and he can tell a difference. "I feel less swollen with therapy and also less mucous." Right shoulder hurts, "That is the shoulder that Dr Sellers took the muscle out of."     Pain:  Pt rates pain 7/10 in right shoulder  Wife reports hearing from a pump compnay called [employer], provided pt and wife with education via video on flexitouch pump through tactile.- last session  Objective     O2 sats 94% p. 94 upon arrival    Pt performed therapeutic exercises as follows x 20 minutes:  UBE L1.5 for 6 minutes alt direction each minute (O2 sats 98 % p. 111)  Pulleys flexion, scaption, abduction x 10 reps ea with a 5 sec hold at the top  Scapular retractions with RTB x 30  Gentle corner wall stretch 30 sec x 3 trials  Manual stretches to R pectoral region in supine and R shoulder flexion/ scaption as tolerated  shoulder ext with red tband 30 reps in standing B  Wall slides forward x 10 with a ten sec hold at the top  Wall slides lateral x 10  Cervical rotation  Cervical lateral flexion    deferred  6MWT ambulated 1508 feet with O2 drop to 82% with sitting rest increased to 93% x 2 minutes  Shoulder flexion in supine 20 reps  Trunk and cervical rot opp directions x 10 reps in supine  Diaphragmatic breathing  Chin tucks  Mandibular depression   Shoulder pulldowns with YTB x 10  Seated shoulder rolls   Cervical rotation/lateral flexion/and " "flexion/extension x 3 reps ea        Manual Lymphatic Drainage was applied to bilateral neck as follows: for 40 minutes: Pre-treatment short neck series, cervical terminus, pre and post auricular, bilateral axillae, abdomen (educated on thoracic duct), and inguinal triangle. Posterior and lateral  cervical area, accessing watershed areas on trunk bilaterally. Lymphatouch to right and left lateral neck, submental nodes, clavicular nodes at 50-60 mmHg. Pt with initially some tenderness to anterior neck, which dissapaited with MLD. Applied rock tape in weave pattern to anteior neck/submental region and right shoulder.  Advised pt to remove if he has any irritation with rock tape or redness. Instructed pt to wear up to 72 hours and explained how to remove tape with good understanding verbalized. Reviewed self MLD techniques with patient for cervical terminus, upper cervical clearing and axilla.    Last session"   PT discussed briefly with patient about the Flexitouch head/neck pneumatic compression pump from [employer] to assist in managing his Lymphedema. Provided patient this information for him and his wife to look at it online.   PT to send pt info to [employer] to check insurance coverage. (pt in agreement)    Strongly encouraged pt to begin walking program, gradually increasing distance. Pt verbalized understanding    Assessment   Patient pleased with how he felt after last treatment session, less fullness sensation, less mucus, as well as breathing better, which will last for a few days but fullness returns. Patient reports sleeping on incline daily, tolerated rock tape well for 3 days without irritation.   Patient with good tissue response to MLD, use of lympha touch to anterior neck and submental regions as well as exercises. Patients vital signs monitored throughout exercises, decreased saturation with UBE but rebounded with deep breathing.  Will continue to monitor O2 stats.     Pt has reduction in " cshoulder arom, increase in girth measurements and increased weakness with nonattendance to PT treatments. Pt plans to schedule and get back to working on therapy goals.  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 Patient completed  chemotherapy 1x a week and 30 tx of radiation at [facility] in [place]. Pt primary complain is of severe fatigue with ADLs and any standing activity.  Patient presents with lymphedema of the head/neck with fullness to submandibular region and anterior/lateral neck, increased pain, increased stiffness in the right shoulder and cervical range of motion, as well as difficulty performing overhead reaching with flexion and abduction ranges and placing the pt at rist of higher infection. This pt would benefit from skilled therapy services to address the above impairments.    Medical necessity is demonstrated by the following IMPAIRMENTS/PROBLEMS:  1. Increased Pain  2. Decreased ROM  3. Decreased BUE strength  4. Postural Imbalance  5. Decreased Tolerance to Functional Activities    The following goals were discussed with the patient and patient is in agreement with them as to be addressed in the treatment plan.     Short Term Goals: (3 weeks) IN PROGRESS  1. Decrease cervical girth by 1 cm  2. Patient will demonstrate 100% knowledge of lymphedema precautions and signs of infection.   3. Patient demonstrates independence with Postural Awareness.   4. Patient will perform self lymph drainage techniques.  5. Patient demonstrates independence with HEP.       Long Term Goals: (6 weeks)  1. Decrease cervical girth by 2 cm.  2. Patient will perform self lymph drainage techniques independently.  3. Patient to tomas/doff compression garment.   4. Patient demonstrates increased R shoulder AROM flexion 160 or greater to improve tolerance  to functional activities pain free.   5. Patient demonstrates increased R shoulder AROM abduction 165 or greater to improve tolerance to functional activities pain free.   6. Patient demonstrates increased strength BUE's to 4+/5 or greater to improve tolerance to functional activities pain free.   7. Patient to report improvement in overall endurance, participating in 20-30 minute walking program 3-4x week.         PLAN   Patient to be seen 2-3 x per week for 18 visits for the medically necessary treatments to include: exercises, decongestive massage, intermittent compression pump, multi layered bandaging, education in lymphedema precautions, self massage, self bandaging, and assistance in obtaining appropriate compression garment. Pt may be seen by PTA as part of the rehabilitation team. Has 11 visits left on POC that was sent in to insurance at [facility].   May benefit from use of Jorge Fascioplasty. Would be excellent candidate for Flexitouch for head and neck.     Rochelle Leslie PTA, CLT  08/18/2022    PT/PTA met face to face to discuss patient's treatment plan and progress towards established goals.  Patient will be seen by physical therapist every sixth visit and minimally once per month.

## 2022-08-23 ENCOUNTER — TELEPHONE (OUTPATIENT)
Dept: HEMATOLOGY/ONCOLOGY | Facility: CLINIC | Age: 67
End: 2022-08-23
Payer: MEDICARE

## 2022-08-26 ENCOUNTER — CLINICAL SUPPORT (OUTPATIENT)
Dept: REHABILITATION | Facility: HOSPITAL | Age: 67
End: 2022-08-26
Attending: OTOLARYNGOLOGY
Payer: MEDICARE

## 2022-08-26 DIAGNOSIS — I89.0 LYMPHEDEMA: ICD-10-CM

## 2022-08-26 DIAGNOSIS — M25.9 SHOULDER JOINT DYSFUNCTION: Primary | ICD-10-CM

## 2022-08-26 PROCEDURE — 97140 MANUAL THERAPY 1/> REGIONS: CPT | Mod: PN,CQ

## 2022-08-26 NOTE — PROGRESS NOTES
"                                                    Physical Therapy Treatment Lymphedema Note     Patient: Seven Freire  Clinic #: [id number]    Date: 8/26/2022  Physician: Octaviano Wallace,*  Diagnosis: Lymphedema, shoulder dysfunction  Evaluation Date: 04/26/2022  Visit #/Visits authorized: 9/ 18 visits auth   Authorization Dates: 4/29/2022- 07/29/2022  Plan of Care Expiration: 07/29/2022  Re-assessment: 7/28/2022  MD order: PT Eval and Treat  PTA visit #2  Time in:  03:00 PM  Time out: 04:05 PM  Time: 65 minutes    Subjective   Having a bad day, depression is getting the better of him. "I don't want to do any exercises; can I just get the massage and tape? And can you teach my wife how to do this at home?    Pain:  Pt rates pain 7/10 in right shoulder  Wife reports hearing from a pump compnay called [employer], provided pt and wife with education via video on flexitouch pump through tactile.- last session  Objective     Trial of Flexitouch pneumatic compression pump to head/neck x 25 minutes, with pt tolerating well and expresses that he has less mucous afterwards.     Manual Lymphatic Drainage was applied to bilateral neck as follows: for 40 minutes: Pre-treatment short neck series, cervical terminus, pre and post auricular, bilateral axillae, abdomen (educated on thoracic duct), and inguinal triangle. Posterior and lateral  cervical area, accessing watershed areas on trunk bilaterally. Lymphatouch to right and left lateral neck, submental nodes, clavicular nodes at 50-60 mmHg.  Applied rock tape in weave pattern to anteior neck/submental region and right shoulder.  Advised pt to remove if he has any irritation with rock tape or redness. Instructed pt to wear up to 72 hours and explained how to remove tape with good understanding verbalized. Reviewed self MLD techniques with patient and wife for cervical terminus, upper cervical clearing and axilla.  Issued cervical collar with Komprex II padding to place " "inside as a trial to use for nighttime compression only.     deferred  6MWT ambulated 1508 feet with O2 drop to 82% with sitting rest increased to 93% x 2 minutes  Shoulder flexion in supine 20 reps  Trunk and cervical rot opp directions x 10 reps in supine  Diaphragmatic breathing  Chin tucks  Mandibular depression   Shoulder pulldowns with YTB x 10  Seated shoulder rolls   Cervical rotation/lateral flexion/and flexion/extension x 3 reps ea    Pt performed therapeutic exercises as follows x 20 minutes:  UBE L1.5 for 6 minutes alt direction each minute (O2 sats 98 % p. 111)  Pulleys flexion, scaption, abduction x 10 reps ea with a 5 sec hold at the top  Scapular retractions with RTB x 30  Gentle corner wall stretch 30 sec x 3 trials  Manual stretches to R pectoral region in supine and R shoulder flexion/ scaption as tolerated  shoulder ext with red tband 30 reps in standing B  Wall slides forward x 10 with a ten sec hold at the top  Wall slides lateral x 10  Cervical rotation  Cervical lateral flexion      Last session"   PT discussed briefly with patient about the Flexitouch head/neck pneumatic compression pump from [employer] to assist in managing his Lymphedema. Provided patient this information for him and his wife to look at it online.   PT to send pt info to [employer] to check insurance coverage. (pt in agreement)    Strongly encouraged pt to begin walking program, gradually increasing distance. Pt verbalized understanding    Assessment   Patient pleased with how he felt after last treatment session, less fullness sensation, less mucus, as well as breathing better, which will last for a few days but fullness returns. Patient reports sleeping on incline daily, tolerated rock tape well for 3 days without irritation. Patient with good tissue response to MLD, use of lympha touch to anterior neck and submental regions as well as Flexitouch pump.      Pt has reduction in cshoulder arom, increase in girth " measurements and increased weakness with nonattendance to PT treatments. Pt plans to schedule and get back to working on therapy goals.  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 Patient completed  chemotherapy 1x a week and 30 tx of radiation at [facility] in [place]. Pt primary complain is of severe fatigue with ADLs and any standing activity.  Patient presents with lymphedema of the head/neck with fullness to submandibular region and anterior/lateral neck, increased pain, increased stiffness in the right shoulder and cervical range of motion, as well as difficulty performing overhead reaching with flexion and abduction ranges and placing the pt at rist of higher infection. This pt would benefit from skilled therapy services to address the above impairments.    Medical necessity is demonstrated by the following IMPAIRMENTS/PROBLEMS:  1. Increased Pain  2. Decreased ROM  3. Decreased BUE strength  4. Postural Imbalance  5. Decreased Tolerance to Functional Activities    The following goals were discussed with the patient and patient is in agreement with them as to be addressed in the treatment plan.     Short Term Goals: (3 weeks) IN PROGRESS  1. Decrease cervical girth by 1 cm  2. Patient will demonstrate 100% knowledge of lymphedema precautions and signs of infection.   3. Patient demonstrates independence with Postural Awareness.   4. Patient will perform self lymph drainage techniques.  5. Patient demonstrates independence with HEP.       Long Term Goals: (6 weeks)  1. Decrease cervical girth by 2 cm.  2. Patient will perform self lymph drainage techniques independently.  3. Patient to tomas/doff compression garment.   4. Patient demonstrates increased R shoulder AROM flexion 160 or greater to improve tolerance to functional activities pain  free.   5. Patient demonstrates increased R shoulder AROM abduction 165 or greater to improve tolerance to functional activities pain free.   6. Patient demonstrates increased strength BUE's to 4+/5 or greater to improve tolerance to functional activities pain free.   7. Patient to report improvement in overall endurance, participating in 20-30 minute walking program 3-4x week.         PLAN   Patient to be seen 2-3 x per week for 18 visits for the medically necessary treatments to include: exercises, decongestive massage, intermittent compression pump, multi layered bandaging, education in lymphedema precautions, self massage, self bandaging, and assistance in obtaining appropriate compression garment. Pt may be seen by PTA as part of the rehabilitation team. Has 9 visits left on POC that was sent in to insurance at [facility].   May benefit from use of Jorge Fascioplasty. Would be excellent candidate for Flexitouch for head and neck.     Rochelle Leslie PTA, CLT  08/26/2022    PT/PTA met face to face to discuss patient's treatment plan and progress towards established goals.  Patient will be seen by physical therapist every sixth visit and minimally once per month.

## 2022-08-29 ENCOUNTER — OFFICE VISIT (OUTPATIENT)
Dept: PSYCHIATRY | Facility: CLINIC | Age: 67
End: 2022-08-29
Payer: MEDICARE

## 2022-08-29 VITALS
WEIGHT: 164.38 LBS | SYSTOLIC BLOOD PRESSURE: 128 MMHG | HEIGHT: 72 IN | HEART RATE: 87 BPM | BODY MASS INDEX: 22.26 KG/M2 | DIASTOLIC BLOOD PRESSURE: 87 MMHG

## 2022-08-29 DIAGNOSIS — F33.1 MODERATE EPISODE OF RECURRENT MAJOR DEPRESSIVE DISORDER: ICD-10-CM

## 2022-08-29 PROCEDURE — 3074F PR MOST RECENT SYSTOLIC BLOOD PRESSURE < 130 MM HG: ICD-10-PCS | Mod: CPTII,S$GLB,, | Performed by: PSYCHOLOGIST

## 2022-08-29 PROCEDURE — 4010F ACE/ARB THERAPY RXD/TAKEN: CPT | Mod: CPTII,S$GLB,, | Performed by: PSYCHOLOGIST

## 2022-08-29 PROCEDURE — 1159F PR MEDICATION LIST DOCUMENTED IN MEDICAL RECORD: ICD-10-PCS | Mod: CPTII,S$GLB,, | Performed by: PSYCHOLOGIST

## 2022-08-29 PROCEDURE — 3079F DIAST BP 80-89 MM HG: CPT | Mod: CPTII,S$GLB,, | Performed by: PSYCHOLOGIST

## 2022-08-29 PROCEDURE — 3079F PR MOST RECENT DIASTOLIC BLOOD PRESSURE 80-89 MM HG: ICD-10-PCS | Mod: CPTII,S$GLB,, | Performed by: PSYCHOLOGIST

## 2022-08-29 PROCEDURE — 3074F SYST BP LT 130 MM HG: CPT | Mod: CPTII,S$GLB,, | Performed by: PSYCHOLOGIST

## 2022-08-29 PROCEDURE — 1101F PT FALLS ASSESS-DOCD LE1/YR: CPT | Mod: CPTII,S$GLB,, | Performed by: PSYCHOLOGIST

## 2022-08-29 PROCEDURE — 4010F PR ACE/ARB THEARPY RXD/TAKEN: ICD-10-PCS | Mod: CPTII,S$GLB,, | Performed by: PSYCHOLOGIST

## 2022-08-29 PROCEDURE — 99999 PR PBB SHADOW E&M-EST. PATIENT-LVL III: CPT | Mod: PBBFAC,,, | Performed by: PSYCHOLOGIST

## 2022-08-29 PROCEDURE — 3288F PR FALLS RISK ASSESSMENT DOCUMENTED: ICD-10-PCS | Mod: CPTII,S$GLB,, | Performed by: PSYCHOLOGIST

## 2022-08-29 PROCEDURE — 1101F PR PT FALLS ASSESS DOC 0-1 FALLS W/OUT INJ PAST YR: ICD-10-PCS | Mod: CPTII,S$GLB,, | Performed by: PSYCHOLOGIST

## 2022-08-29 PROCEDURE — 3008F PR BODY MASS INDEX (BMI) DOCUMENTED: ICD-10-PCS | Mod: CPTII,S$GLB,, | Performed by: PSYCHOLOGIST

## 2022-08-29 PROCEDURE — 1125F AMNT PAIN NOTED PAIN PRSNT: CPT | Mod: CPTII,S$GLB,, | Performed by: PSYCHOLOGIST

## 2022-08-29 PROCEDURE — 1125F PR PAIN SEVERITY QUANTIFIED, PAIN PRESENT: ICD-10-PCS | Mod: CPTII,S$GLB,, | Performed by: PSYCHOLOGIST

## 2022-08-29 PROCEDURE — 90792 PR PSYCHIATRIC DIAGNOSTIC EVALUATION W/MEDICAL SERVICES: ICD-10-PCS | Mod: S$GLB,,, | Performed by: PSYCHOLOGIST

## 2022-08-29 PROCEDURE — 90792 PSYCH DIAG EVAL W/MED SRVCS: CPT | Mod: S$GLB,,, | Performed by: PSYCHOLOGIST

## 2022-08-29 PROCEDURE — 3008F BODY MASS INDEX DOCD: CPT | Mod: CPTII,S$GLB,, | Performed by: PSYCHOLOGIST

## 2022-08-29 PROCEDURE — 1159F MED LIST DOCD IN RCRD: CPT | Mod: CPTII,S$GLB,, | Performed by: PSYCHOLOGIST

## 2022-08-29 PROCEDURE — 3288F FALL RISK ASSESSMENT DOCD: CPT | Mod: CPTII,S$GLB,, | Performed by: PSYCHOLOGIST

## 2022-08-29 PROCEDURE — 99999 PR PBB SHADOW E&M-EST. PATIENT-LVL III: ICD-10-PCS | Mod: PBBFAC,,, | Performed by: PSYCHOLOGIST

## 2022-08-29 RX ORDER — METHYLPHENIDATE HYDROCHLORIDE 5 MG/1
5 TABLET ORAL 2 TIMES DAILY
COMMUNITY
Start: 2022-08-22 | End: 2022-08-29

## 2022-08-29 RX ORDER — METHYLPHENIDATE HYDROCHLORIDE 5 MG/1
5 TABLET ORAL 2 TIMES DAILY
COMMUNITY

## 2022-08-29 RX ORDER — TRAZODONE HYDROCHLORIDE 100 MG/1
100-200 TABLET ORAL NIGHTLY
Qty: 60 TABLET | Refills: 1 | Status: SHIPPED | OUTPATIENT
Start: 2022-08-29 | End: 2022-11-02

## 2022-08-29 RX ORDER — DRONABINOL 2.5 MG/1
2.5 CAPSULE ORAL DAILY
COMMUNITY
Start: 2022-08-03

## 2022-08-29 RX ORDER — BUPROPION HYDROCHLORIDE 300 MG/1
300 TABLET ORAL DAILY
Qty: 30 TABLET | Refills: 1 | Status: SHIPPED | OUTPATIENT
Start: 2022-08-29 | End: 2023-08-29

## 2022-08-29 NOTE — LETTER
August 29, 2022        Ewa Jaramillo MD  [address] [facility], [place] [address]             [place] - Psychiatry  [address]  [facility] [address]  Phone: [phone]   Patient: Seven Freire   MR Number: [id number]   YOB: 1955   Date of Visit: 8/29/2022       Dear Dr. Jaramillo    Thank you for referring Seven Freire to me for evaluation. Below are the relevant portions of my assessment and plan of care.    Pt is to increase Wellbutrin XL dosage to 300mg Q AM and restart Trazodone 100-200mg Q HS PRN. Pt will continue Ritalin 5mg BID and will consider increasing in the future.     If you have questions, please do not hesitate to call me. I look forward to following Seven along with you.    Sincerely,      Mark Anthony Gann, PhD, MP           CC    No Recipients
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
## 2022-09-08 ENCOUNTER — TELEPHONE (OUTPATIENT)
Dept: PSYCHIATRY | Facility: CLINIC | Age: 67
End: 2022-09-08
Payer: MEDICARE

## 2022-09-08 NOTE — TELEPHONE ENCOUNTER
"Patient spouse stop by the clinic today 9/8/2022 to give a fax number to Dr. Gann. To have report sent fax to [phone].    This report is to state that patient is mentally cleared to have an IT pump trial so insurance would pay for it.     Called patient to inform him that the initial visit with Dr. Gann on 8/9/2022 was a referral from Dr. Padilla to treat for depression.    Patient states, " I didn't know I needed a clearance from Dr. Gann to get this done." I have this scheduled for next week and I was just told today that I needed a psychologic clearance."     When I asked patient if he mentioned any thing to Dr. Gann about the IT pump trial during his session, patient states," No" I didn't even think about it because I had no idea I needed to get clearance."     Informed patient that a separate appointment is need for psychologic clearance to see if he is a good candidate for trial.  Please advice.  Jessica"

## 2022-09-09 ENCOUNTER — TELEPHONE (OUTPATIENT)
Dept: PSYCHIATRY | Facility: CLINIC | Age: 67
End: 2022-09-09
Payer: MEDICARE

## 2022-11-18 ENCOUNTER — DOCUMENTATION ONLY (OUTPATIENT)
Dept: ADMINISTRATIVE | Facility: OTHER | Age: 67
End: 2022-11-18
Payer: MEDICARE

## 2022-11-22 NOTE — PROGRESS NOTES
2022      Hudson Seals M.D.   [address] [facility] [place] [address]      RE: Seven Freire    MR#:  [id number]    :  1955       DX:  1.  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  Right modified radical neck dissection in 2022.  Unknown head and neck primary.  Group pathologic stage IVB, cT0 pN3b (5/39 right cervical lymph nodes were positive, including major extracapsular extension to the sternocleidomastoid muscle, internal jugular vein, adventitia of the common carotid artery and the right pharyngeal constrictors muscles; largest lymph node was 3.1 cm) cM0.  PD-L1 positive.   2.        My 2022 evaluation suggests severe depression.         Dear Hudson:     Your patient returns six months after completing postoperative irradiation delivered with concurrent cisplatin.  Since I saw the patient last on August 15, he has done well.  He saw Dr. Padilla one time.  The patient decided to get his antidepression medications from his primary care physician.  The patient's depression has dramatically improved and he is very grateful about this.     On , a PET-CT scan was performed.  There is no evidence for active cancer.  The previous mild hypermetabolic activity in the L2 vertebral body has decreased significantly with an SUV of 3.3, compared to the previous SUV of 5.2, and this would be strong evidence that this is not related to cancer.  There is an interval development of patchy infiltrates in the upper lobe and in the lower lobes, and I agree with the radiologist that these are very likely inflammatory in nature.  The radiologist recommended follow up CT to ensure stability.     On , the patient saw Dr. Haddad and no evidence for recurrence was noted.     The patient is doing very well.  He is doing antitrismus exercises and he was reminded to continue doing these.  He is using fluoride for  dental protection.  On questioning, the patient says that, although he swallows well, occasionally he does have some slight aspiration and I think this would explain the findings on the current PET-CT scan.  The patient states his depression is dramatically improved with medications.  The patient is doing his recommended treatment for neck lymphedema.     Physical exam shows a man in no distress.  His mood is dramatically improved, compared with when I saw him last.  He does have some neck lymphedema, but it is not terrible.  There is no cervical lymphadenopathy.  Examination of the oral cavity and oropharynx is unremarkable.     Fiberoptic examination shows mild right greater than left laryngeal edema, but this is fairly mild.  There is no evidence for tumor recurrence in the larynx or any of the pharynx.     Assessment and Plan:  Symptomatically, the patient is doing very well and I see no evidence for tumor recurrence on history, physical exam or current PET-CT scan.  On December 8, the patient will be seeing Dr. Sellers in follow up, and according to the patient he is getting his head and neck followup through Dr. Sellers rather than from your office.  According to the patient, Dr. Haddad will be repeating the CT scan of the chest and possibly the neck in three or four months.  I have asked to see the patient in March and he will return with a TSH to rule out radiation-related hypothyroidism (this would only be done if it had not already been done through Dr. Sellers's or Dr. Haddad's office).     35 minutes was spent in this follow up, of which about 10 minutes was spent in record and image review, 20 minutes was spent directly with the patient, and five minutes was spent in documentation.     I appreciate the opportunity to consult on your patients.  Please call me with any questions or comments.     Sincerely,          Electronically Signed By:  Christian Wallace M.D.                                                [facility]  DD:  11/19/2022  DT:  11/21/2022  Job #:  [id number]    CC:  Ewa Jaramillo M.D., [address] [place] [address], Fax: [phone]        Kalpesh Haddad M.D., [address] [place] [address], Fax: [phone]        Juanita Sellers M.D., [address], [place] [address], Fax: [phone]

## 2022-12-05 NOTE — PROGRESS NOTES
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  HPV and EBV status unknown at this time due to lack of pathologic material.  NCCN guidelines recommend up front neck dissection followed with radiation therapy +/- chemo pending final path as per Dr Wallace's note.  On presentation today he denies neck pain, otalgia, dysphagia, odynophagia.    Patient reports that he bled heavily following tonsillectomy.  He presented to a local urgent care.  Patient was told that he had an infection it was prescribed Augmentin.  He has been very weak following that episode and due to low blood pressure he has stopped his antihypertensives, Plavix and aspirin.  He did not consult with his physician 1st.    Patient also has a history of tobacco abuse and continues to smoke a pack of cigarettes a day.  He is not interested in tobacco cessation at this time.  He also has a history of depression for which he was taken Wellbutrin.  He also discontinued this as he thought it was not helping him    3/15/2022  Patient is here today for post op visit. Staples were removed last week.  He has no complaints.  He was seen by Dr. Wallace last Friday who noted an also on his right soft palate.  Patient reports that this is painful only when it is touched.  Patient also noted neck swelling starting about 2 days ago.    Patient continues to using nicotine patch and has not smoke since surgery.    3/17/2022  Here today because seroma has reaccumulated  No symptoms    03/22/2022  Patient is here today for follow-up for re-evaluation of his neck.  He presented with postop  seroma.  He also presented with the also involving his right soft palate postop which could be due to trauma versus a small carcinoma.  Patient reports that the seroma has reacumulated with a small amount.  He also had PT scheduled today but it was cancelled due to weather.    5/26/2022  Patient is here today for follow-up.  He is status post an extended right modified radical neck dissection were unknown primary.  Postoperatively his course was complicated with a seroma which was aspirated in clinic.  He is status post completion of chemoradiation 5/13/2022  Patient overall tolerated treatment well except for extreme fatigue.  He canceled his physical and occupational therapy appointments due to feeling exhausted. He was treated with Ritalin by Dr Haddad which gave him nightmares.  He stopped this medication.  He is tolerating a regular diet.  He has lost 15 lb since started treatment but his weight is now stabilized.    12/8/2022  Patient is here routine cancer surveillance.  He was recently seen by Dr. Wallace who reported that his recent PET CT scan was negative (not available for my review today).  Patient has lost 13 pounds since May 2022. He only eats one meal a day--he has always done so. In addition to one meal he eats a banana and one Ensure. Patient reports that he is gaining weight. A more recent weight according to patient was 162. He occasionally gets choked on meats.  He is also using Lymphedema machine at home but it has not assisted with lymphedema and machine leaves deep marks on his face. Patient thinks that it is not fitting correctly.      ROS: see HPI  Constitutional: Negative for activity change and appetite change, weight loss.   Eyes: Negative for discharge, visual changes.   Respiratory: Negative for difficulty breathing and wheezing   Cardiovascular: Negative for chest pain.   Gastrointestinal: Negative for abdominal distention and abdominal pain.   Endocrine: Negative for cold  intolerance and heat intolerance.   Genitourinary: Negative for dysuria.   Musculoskeletal: Negative for gait problem, muscle pain and joint swelling.   Skin: Negative for color change and pallor; negative for skin lesions.   Neurological: Negative for syncope and weakness; no numbness face.   Psychiatric/Behavioral: Negative for agitation and confusion; negative for depression.    Physical Exam:  Physical Exam:      Constitutional  General Appearance: well nourished, well-developed, alert, oriented, in no acute distress  Communication: ability, understanding, normal  Head and Face  Inspection: normocephalic, atraumatic, no scars, lesions or masses   Palpation: no stepoffs, sinus tenderness or masses  Parotid glands: no masses, stones, swelling or tenderness  Eyes  Ocular Motility / Alignment: normal alignment, motility, no proptosis, enophthalmus or nystagmus  Conjunctiva: not injected  Eyelids: no hooding, lag, entropion, or ectropion  Nose  External Nose: no lesions, tenderness, trauma or deformity  Intranasal Exam: no edema, erythema, discharge, mass or obstruction  Oral Cavity / Oropharynx  Lips: upper and lower lips pink and moist  Teeth: good dentition  Gingiva: healthy  Oral Mucosa: thick white saliva; no mucosal lesions  Floor of Mouth: normal, no lesions, salivary ducts patent  Tongue: moist, normal mobility, no lesions  Palate: soft and hard palates without lesions or ulcers  Oropharynx: tonsils absent; no lesions  Nasopharynx, Hypopharynx, and Larynx  Indirect:          Thick mucous; post larynx normal  Neck  Inspection and Palpation: right sided hockey incision healed; pedicle to PMMF palpated --smaller; marked firm submental edema  Larynx and Trachea: normal position; normal crepitus  Thyroid: no tenderness, enlargement or nodules  Submandibular Glands: no masses or tenderness  Lymphatic:  Anterior, Posterior, Submandibular, Submental, Supraclavicular: no masses    Neurological  Cranial Nerves: minimal  "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 The nasal cavity, nasopharynx, oropharynx, hypopharynx and larynx were adequately visualized. The true vocal cords and arytenoids were examined during phonation and repose.        Operative Findings:     Nasal Cavity: Within normal limits  Nasopharynx: Within normal limits  Tongue Base: Within normal limits  Pharyngeal Walls: Within normal limits  Epiglottis / Aryepiglottic Folds: edema lingual and laryngeal surface epiglottis; edema bilateral AE folds  Pyriform Sinus: Within normal limits  Vocal Cords: Within normal limits  Arytenoids: Within normal limits; bilateral edema, R > L         Path:    1. LYMPH NODE, EXTERNAL JUGULAR VEIN, EXCISION:       --ONE LYMPH NODE WITH NO MORPHOLOGIC EVIDENCE OF MALIGNANCY (0/1).     2. 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   --LARGEST PART 2 SPECIMEN METASTATIC DEPOSIT: APPROXIMATELY 31    MILLIMETERS.       --EXTRANODAL EXTENSION:  PRESENT.       --NO PERINEURAL INVASION IDENTIFIED.       --SKELETAL MUSCLE INVOLVED BY METASTATIC SQUAMOUS CELL CARCINOMA.   --PD-L1 STUDY AND EBV IMMUNOHISTOCHEMISTRY ARE PENDING AND WILL BE    REPORTED IN ADDENDA.     3. RIGHT NECK, FURTHER DESIGNATED " ""LEVELS 4 AND 5," DISSECTION:   --ONE OUT OF NINETEEN LYMPH NODES IS POSITIVE FOR METASTATIC    KERATINIZING SQUAMOUS CELL CARCINOMA         (1/19).   --LARGEST PART 3 SPECIMEN METASTATIC DEPOSIT: APPROXIMATELY 8    MILLIMETERS.       --EXTRANODAL EXTENSION:  PRESENT.       --NO PERINEURAL INVASION IDENTIFIED.       --SKELETAL MUSCLE WITH NO MORPHOLOGIC EVIDENCE OF MALIGNANCY.     Human Papillomavirus (HPV) Testing:   p16 Immunohistochemistry (IHC) as a Surrogate for Transcriptionally    Active High-Risk HPV: NEGATIVE (no staining identified).   EBV negative  PDL 1 positive (85)      Records reviewed: Dr Wallace 11/18/2022  Assessment and Plan:  Symptomatically, the patient is doing very well and I see no evidence for tumor recurrence on history, physical exam or current PET-CT scan.  On December 8, the patient will be seeing Dr. Sellers in follow up, and according to the patient he is getting his head and neck followup through Dr. Sellers rather than from your office.  According to the patient, Dr. Haddad will be repeating the CT scan of the chest and possibly the neck in three or four months.  I have asked to see the patient in March and he will return with a TSH to rule out radiation-related hypothyroidism (this would only be done if it had not already been done through Dr. Sellers's or Dr. Haddad's office).     Assessment:   Unknown primary of the head and neck status post right modified radical neck dissection and pectoralis major Levar fascial flap--P 16 negative; PDL1 positive  ALEXANDREA  Marked submental edema--vest not fitting properly    Plan:  He is to see Dr Wallace in March so I will schedule patient to see me in May 2023  To have lymphedema vest refitted  "

## 2022-12-08 ENCOUNTER — OFFICE VISIT (OUTPATIENT)
Dept: HEMATOLOGY/ONCOLOGY | Facility: CLINIC | Age: 67
End: 2022-12-08
Payer: MEDICARE

## 2022-12-08 ENCOUNTER — TELEPHONE (OUTPATIENT)
Dept: HEMATOLOGY/ONCOLOGY | Facility: CLINIC | Age: 67
End: 2022-12-08
Payer: MEDICARE

## 2022-12-08 VITALS
HEART RATE: 98 BPM | BODY MASS INDEX: 22.6 KG/M2 | DIASTOLIC BLOOD PRESSURE: 73 MMHG | WEIGHT: 166.88 LBS | HEIGHT: 72 IN | TEMPERATURE: 97 F | RESPIRATION RATE: 18 BRPM | OXYGEN SATURATION: 94 % | SYSTOLIC BLOOD PRESSURE: 117 MMHG

## 2022-12-08 DIAGNOSIS — I89.0 LYMPHEDEMA: ICD-10-CM

## 2022-12-08 DIAGNOSIS — C80.1 SECONDARY SQUAMOUS CELL CARCINOMA OF HEAD AND NECK WITH UNKNOWN PRIMARY SITE: Primary | ICD-10-CM

## 2022-12-08 DIAGNOSIS — C79.89 SECONDARY SQUAMOUS CELL CARCINOMA OF HEAD AND NECK WITH UNKNOWN PRIMARY SITE: Primary | ICD-10-CM

## 2022-12-08 PROCEDURE — 4010F PR ACE/ARB THEARPY RXD/TAKEN: ICD-10-PCS | Mod: CPTII,S$GLB,, | Performed by: OTOLARYNGOLOGY

## 2022-12-08 PROCEDURE — 1160F RVW MEDS BY RX/DR IN RCRD: CPT | Mod: CPTII,S$GLB,, | Performed by: OTOLARYNGOLOGY

## 2022-12-08 PROCEDURE — 1126F PR PAIN SEVERITY QUANTIFIED, NO PAIN PRESENT: ICD-10-PCS | Mod: CPTII,S$GLB,, | Performed by: OTOLARYNGOLOGY

## 2022-12-08 PROCEDURE — 99999 PR PBB SHADOW E&M-EST. PATIENT-LVL IV: CPT | Mod: PBBFAC,,, | Performed by: OTOLARYNGOLOGY

## 2022-12-08 PROCEDURE — 1159F MED LIST DOCD IN RCRD: CPT | Mod: CPTII,S$GLB,, | Performed by: OTOLARYNGOLOGY

## 2022-12-08 PROCEDURE — 1101F PT FALLS ASSESS-DOCD LE1/YR: CPT | Mod: CPTII,S$GLB,, | Performed by: OTOLARYNGOLOGY

## 2022-12-08 PROCEDURE — 31575 DIAGNOSTIC LARYNGOSCOPY: CPT | Mod: S$GLB,,, | Performed by: OTOLARYNGOLOGY

## 2022-12-08 PROCEDURE — 99214 PR OFFICE/OUTPT VISIT, EST, LEVL IV, 30-39 MIN: ICD-10-PCS | Mod: 25,S$GLB,, | Performed by: OTOLARYNGOLOGY

## 2022-12-08 PROCEDURE — 1126F AMNT PAIN NOTED NONE PRSNT: CPT | Mod: CPTII,S$GLB,, | Performed by: OTOLARYNGOLOGY

## 2022-12-08 PROCEDURE — 3288F FALL RISK ASSESSMENT DOCD: CPT | Mod: CPTII,S$GLB,, | Performed by: OTOLARYNGOLOGY

## 2022-12-08 PROCEDURE — 31575 PR LARYNGOSCOPY, FLEXIBLE; DIAGNOSTIC: ICD-10-PCS | Mod: S$GLB,,, | Performed by: OTOLARYNGOLOGY

## 2022-12-08 PROCEDURE — 3288F PR FALLS RISK ASSESSMENT DOCUMENTED: ICD-10-PCS | Mod: CPTII,S$GLB,, | Performed by: OTOLARYNGOLOGY

## 2022-12-08 PROCEDURE — 3074F SYST BP LT 130 MM HG: CPT | Mod: CPTII,S$GLB,, | Performed by: OTOLARYNGOLOGY

## 2022-12-08 PROCEDURE — 4010F ACE/ARB THERAPY RXD/TAKEN: CPT | Mod: CPTII,S$GLB,, | Performed by: OTOLARYNGOLOGY

## 2022-12-08 PROCEDURE — 3078F PR MOST RECENT DIASTOLIC BLOOD PRESSURE < 80 MM HG: ICD-10-PCS | Mod: CPTII,S$GLB,, | Performed by: OTOLARYNGOLOGY

## 2022-12-08 PROCEDURE — 1159F PR MEDICATION LIST DOCUMENTED IN MEDICAL RECORD: ICD-10-PCS | Mod: CPTII,S$GLB,, | Performed by: OTOLARYNGOLOGY

## 2022-12-08 PROCEDURE — 1101F PR PT FALLS ASSESS DOC 0-1 FALLS W/OUT INJ PAST YR: ICD-10-PCS | Mod: CPTII,S$GLB,, | Performed by: OTOLARYNGOLOGY

## 2022-12-08 PROCEDURE — 1160F PR REVIEW ALL MEDS BY PRESCRIBER/CLIN PHARMACIST DOCUMENTED: ICD-10-PCS | Mod: CPTII,S$GLB,, | Performed by: OTOLARYNGOLOGY

## 2022-12-08 PROCEDURE — 3078F DIAST BP <80 MM HG: CPT | Mod: CPTII,S$GLB,, | Performed by: OTOLARYNGOLOGY

## 2022-12-08 PROCEDURE — 3008F PR BODY MASS INDEX (BMI) DOCUMENTED: ICD-10-PCS | Mod: CPTII,S$GLB,, | Performed by: OTOLARYNGOLOGY

## 2022-12-08 PROCEDURE — 3008F BODY MASS INDEX DOCD: CPT | Mod: CPTII,S$GLB,, | Performed by: OTOLARYNGOLOGY

## 2022-12-08 PROCEDURE — 3074F PR MOST RECENT SYSTOLIC BLOOD PRESSURE < 130 MM HG: ICD-10-PCS | Mod: CPTII,S$GLB,, | Performed by: OTOLARYNGOLOGY

## 2022-12-08 PROCEDURE — 99999 PR PBB SHADOW E&M-EST. PATIENT-LVL IV: ICD-10-PCS | Mod: PBBFAC,,, | Performed by: OTOLARYNGOLOGY

## 2022-12-08 PROCEDURE — 99214 OFFICE O/P EST MOD 30 MIN: CPT | Mod: 25,S$GLB,, | Performed by: OTOLARYNGOLOGY

## 2022-12-08 RX ORDER — MUPIROCIN 20 MG/G
OINTMENT TOPICAL
COMMUNITY
End: 2023-06-15

## 2022-12-08 NOTE — TELEPHONE ENCOUNTER
Ms. Song calling to notify Dr Sellers's office that she has spoken with the contractor from the lymphedema brace company and has an appointment for him to meet with pt.  Denies further questions/concerns. ----- Message from Juarez Munoz sent at 12/8/2022 12:01 PM CST -----  Type: Need Medical Advice  Who Called: wife of patient  Best callback number: [phone]  Additional Info:Wife called saying she got in contact with the person who brought the neck brace treatment,   Please call to further assist, Thanks

## 2022-12-12 ENCOUNTER — TELEPHONE (OUTPATIENT)
Dept: HEMATOLOGY/ONCOLOGY | Facility: CLINIC | Age: 67
End: 2022-12-12
Payer: MEDICARE

## 2022-12-12 NOTE — TELEPHONE ENCOUNTER
Returned call and spoke with pt's wife.  Pt has cough and chest congestion.  Pt was tested by wife and is Covid -.  Instructed pt's wife to contact pt's PCP for their recommendations.  Wife verbalized understanding. ----- Message from Juarez Munoz sent at 12/12/2022  1:26 PM CST -----  Type: Need Medical Advice  Who Called:[facility] callback number: [phone]  Additional Info: wife called stating the above patient is sick he has a bad cough and cold and would like something prescribed for him  Please call to further assist, Thanks

## 2023-02-08 ENCOUNTER — TELEPHONE (OUTPATIENT)
Dept: HEMATOLOGY/ONCOLOGY | Facility: CLINIC | Age: 68
End: 2023-02-08
Payer: MEDICARE

## 2023-02-08 NOTE — TELEPHONE ENCOUNTER
Pt's wife calling because pt is having dysphagia.  Unable to eat without difficulty for about 1 week.  Pt does not have a GI doctor. Soonest appointment made. Will update Dr Sellers on pt status. ----- Message from Dayana Hutchins sent at 2/8/2023  3:20 PM CST -----  Type: Needs Medical Advice  Who Called:  Nohemi (spouse)  Symptoms (please be specific):    How long has patient had these symptoms:    Pharmacy name and phone #:    Best Call Back Number: [phone]  Additional Information: Nohemi is requesting a call back to schedule a sooner appt.for her .

## 2023-02-13 ENCOUNTER — OFFICE VISIT (OUTPATIENT)
Dept: HEMATOLOGY/ONCOLOGY | Facility: CLINIC | Age: 68
End: 2023-02-13
Payer: MEDICARE

## 2023-02-13 VITALS
WEIGHT: 159.38 LBS | HEIGHT: 72 IN | SYSTOLIC BLOOD PRESSURE: 125 MMHG | RESPIRATION RATE: 18 BRPM | TEMPERATURE: 97 F | DIASTOLIC BLOOD PRESSURE: 80 MMHG | OXYGEN SATURATION: 94 % | BODY MASS INDEX: 21.59 KG/M2 | HEART RATE: 100 BPM

## 2023-02-13 DIAGNOSIS — R13.10 DYSPHAGIA, UNSPECIFIED TYPE: Primary | ICD-10-CM

## 2023-02-13 DIAGNOSIS — I89.0 LYMPHEDEMA: ICD-10-CM

## 2023-02-13 DIAGNOSIS — Z85.89 HISTORY OF HEAD AND NECK CANCER: ICD-10-CM

## 2023-02-13 DIAGNOSIS — F10.10 ALCOHOL ABUSE: ICD-10-CM

## 2023-02-13 DIAGNOSIS — R13.19 OTHER DYSPHAGIA: ICD-10-CM

## 2023-02-13 PROCEDURE — 1101F PT FALLS ASSESS-DOCD LE1/YR: CPT | Mod: CPTII,S$GLB,, | Performed by: OTOLARYNGOLOGY

## 2023-02-13 PROCEDURE — 99999 PR PBB SHADOW E&M-EST. PATIENT-LVL V: ICD-10-PCS | Mod: PBBFAC,,, | Performed by: OTOLARYNGOLOGY

## 2023-02-13 PROCEDURE — 3008F PR BODY MASS INDEX (BMI) DOCUMENTED: ICD-10-PCS | Mod: CPTII,S$GLB,, | Performed by: OTOLARYNGOLOGY

## 2023-02-13 PROCEDURE — 1126F AMNT PAIN NOTED NONE PRSNT: CPT | Mod: CPTII,S$GLB,, | Performed by: OTOLARYNGOLOGY

## 2023-02-13 PROCEDURE — 99214 PR OFFICE/OUTPT VISIT, EST, LEVL IV, 30-39 MIN: ICD-10-PCS | Mod: 25,S$GLB,, | Performed by: OTOLARYNGOLOGY

## 2023-02-13 PROCEDURE — 3079F PR MOST RECENT DIASTOLIC BLOOD PRESSURE 80-89 MM HG: ICD-10-PCS | Mod: CPTII,S$GLB,, | Performed by: OTOLARYNGOLOGY

## 2023-02-13 PROCEDURE — 1160F RVW MEDS BY RX/DR IN RCRD: CPT | Mod: CPTII,S$GLB,, | Performed by: OTOLARYNGOLOGY

## 2023-02-13 PROCEDURE — 1126F PR PAIN SEVERITY QUANTIFIED, NO PAIN PRESENT: ICD-10-PCS | Mod: CPTII,S$GLB,, | Performed by: OTOLARYNGOLOGY

## 2023-02-13 PROCEDURE — 3074F SYST BP LT 130 MM HG: CPT | Mod: CPTII,S$GLB,, | Performed by: OTOLARYNGOLOGY

## 2023-02-13 PROCEDURE — 3074F PR MOST RECENT SYSTOLIC BLOOD PRESSURE < 130 MM HG: ICD-10-PCS | Mod: CPTII,S$GLB,, | Performed by: OTOLARYNGOLOGY

## 2023-02-13 PROCEDURE — 1159F PR MEDICATION LIST DOCUMENTED IN MEDICAL RECORD: ICD-10-PCS | Mod: CPTII,S$GLB,, | Performed by: OTOLARYNGOLOGY

## 2023-02-13 PROCEDURE — 99214 OFFICE O/P EST MOD 30 MIN: CPT | Mod: 25,S$GLB,, | Performed by: OTOLARYNGOLOGY

## 2023-02-13 PROCEDURE — 1101F PR PT FALLS ASSESS DOC 0-1 FALLS W/OUT INJ PAST YR: ICD-10-PCS | Mod: CPTII,S$GLB,, | Performed by: OTOLARYNGOLOGY

## 2023-02-13 PROCEDURE — 1160F PR REVIEW ALL MEDS BY PRESCRIBER/CLIN PHARMACIST DOCUMENTED: ICD-10-PCS | Mod: CPTII,S$GLB,, | Performed by: OTOLARYNGOLOGY

## 2023-02-13 PROCEDURE — 3079F DIAST BP 80-89 MM HG: CPT | Mod: CPTII,S$GLB,, | Performed by: OTOLARYNGOLOGY

## 2023-02-13 PROCEDURE — 3008F BODY MASS INDEX DOCD: CPT | Mod: CPTII,S$GLB,, | Performed by: OTOLARYNGOLOGY

## 2023-02-13 PROCEDURE — 3288F PR FALLS RISK ASSESSMENT DOCUMENTED: ICD-10-PCS | Mod: CPTII,S$GLB,, | Performed by: OTOLARYNGOLOGY

## 2023-02-13 PROCEDURE — 99999 PR PBB SHADOW E&M-EST. PATIENT-LVL V: CPT | Mod: PBBFAC,,, | Performed by: OTOLARYNGOLOGY

## 2023-02-13 PROCEDURE — 3288F FALL RISK ASSESSMENT DOCD: CPT | Mod: CPTII,S$GLB,, | Performed by: OTOLARYNGOLOGY

## 2023-02-13 PROCEDURE — 31575 DIAGNOSTIC LARYNGOSCOPY: CPT | Mod: S$GLB,,, | Performed by: OTOLARYNGOLOGY

## 2023-02-13 PROCEDURE — 31575 PR LARYNGOSCOPY, FLEXIBLE; DIAGNOSTIC: ICD-10-PCS | Mod: S$GLB,,, | Performed by: OTOLARYNGOLOGY

## 2023-02-13 PROCEDURE — 1159F MED LIST DOCD IN RCRD: CPT | Mod: CPTII,S$GLB,, | Performed by: OTOLARYNGOLOGY

## 2023-02-13 RX ORDER — BUPROPION HYDROCHLORIDE 150 MG/1
TABLET, EXTENDED RELEASE ORAL
COMMUNITY
Start: 2022-12-13

## 2023-02-13 RX ORDER — OXYCODONE 13.5 MG/1
CAPSULE, EXTENDED RELEASE ORAL
COMMUNITY
Start: 2023-01-17

## 2023-02-13 NOTE — PROGRESS NOTES
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  HPV and EBV status unknown at this time due to lack of pathologic material.  NCCN guidelines recommend up front neck dissection followed with radiation therapy +/- chemo pending final path as per Dr Wallace's note.  On presentation today he denies neck pain, otalgia, dysphagia, odynophagia.    Patient reports that he bled heavily following tonsillectomy.  He presented to a local urgent care.  Patient was told that he had an infection it was prescribed Augmentin.  He has been very weak following that episode and due to low blood pressure he has stopped his antihypertensives, Plavix and aspirin.  He did not consult with his physician 1st.    Patient also has a history of tobacco abuse and continues to smoke a pack of cigarettes a day.  He is not interested in tobacco cessation at this time.  He also has a history of depression for which he was taken Wellbutrin.  He also discontinued this as he thought it was not helping him    3/15/2022  Patient is here today for post op visit. Staples were removed last week.  He has no complaints.  He was seen by Dr. Wallace last Friday who noted an also on his right soft palate.  Patient reports that this is painful only when it is touched.  Patient also noted neck swelling starting about 2 days ago.    Patient continues to using nicotine patch and has not smoke since surgery.    3/17/2022  Here today because seroma has reaccumulated  No symptoms    03/22/2022  Patient is here today for follow-up for re-evaluation of his neck.  He presented with postop  seroma.  He also presented with the also involving his right soft palate postop which could be due to trauma versus a small carcinoma.  Patient reports that the seroma has reacumulated with a small amount.  He also had PT scheduled today but it was cancelled due to weather.    5/26/2022  Patient is here today for follow-up.  He is status post an extended right modified radical neck dissection were unknown primary.  Postoperatively his course was complicated with a seroma which was aspirated in clinic.  He is status post completion of chemoradiation 5/13/2022  Patient overall tolerated treatment well except for extreme fatigue.  He canceled his physical and occupational therapy appointments due to feeling exhausted. He was treated with Ritalin by Dr Haddad which gave him nightmares.  He stopped this medication.  He is tolerating a regular diet.  He has lost 15 lb since started treatment but his weight is now stabilized.    12/8/2022  Patient is here routine cancer surveillance.  He was recently seen by Dr. Wallace who reported that his recent PET CT scan was negative (not available for my review today).  Patient has lost 13 pounds since May 2022. He only eats one meal a day--he has always done so. In addition to one meal he eats a banana and one Ensure. Patient reports that he is gaining weight. A more recent weight according to patient was 162. He occasionally gets choked on meats.  He is also using Lymphedema machine at home but it has not assisted with lymphedema and machine leaves deep marks on his face. Patient thinks that it is not fitting correctly.      2/13/2023    Patient is here today for worsening dysphagia. He can tolerate soft foods and soups and some meats: Breakfast: 2 cups coffee plus boost and banana; no lunch; full meal for supper. Some days he does not eat supper (2-3 nights/week). This occurs with anorexia. He has lost 7 pounds since last visit. He reports that drinks 5-12 beers/day and this  "sometimes kills his appetite.  He stopped smoking following surgery.  Patient also feels like there is a "pocket" in his throat.  He has a hx of esophageal stenosis S/P dilation years ago.    He has not seen PT, OT or ST in several months    Patient is to see Dr Wallace in March in addition to a PET CT scan. He just saw [facility] last week and according to patient his lab work was normal. However I cannot view results and it is unclear as to whether a TSH was drawn.    ROS: see HPI  Constitutional: Negative for activity change and appetite change, weight loss.   Eyes: Negative for discharge, visual changes.   Respiratory: Negative for difficulty breathing and wheezing   Cardiovascular: Negative for chest pain.   Gastrointestinal: Negative for abdominal distention and abdominal pain.   Endocrine: Negative for cold intolerance and heat intolerance.   Genitourinary: Negative for dysuria.   Musculoskeletal: Negative for gait problem, muscle pain and joint swelling.   Skin: Negative for color change and pallor; negative for skin lesions.   Neurological: Negative for syncope and weakness; no numbness face.   Psychiatric/Behavioral: Negative for agitation and confusion; negative for depression.    Physical Exam:      Constitutional  General Appearance: well nourished, well-developed, alert, oriented, in no acute distress' very thin but not cachectic  Communication: ability, understanding, normal  Head and Face  Inspection: normocephalic, atraumatic, no scars, lesions or masses   Palpation: no stepoffs, sinus tenderness or masses  Parotid glands: no masses, stones, swelling or tenderness  Eyes  Ocular Motility / Alignment: normal alignment, motility, no proptosis, enophthalmus or nystagmus  Conjunctiva: not injected  Eyelids: no hooding, lag, entropion, or ectropion  Nose  External Nose: no lesions, tenderness, trauma or deformity  Intranasal Exam: no edema, erythema, discharge, mass or obstruction  Oral Cavity / " Oropharynx  Lips: upper and lower lips pink and moist  Teeth: good dentition  Gingiva: healthy  Oral Mucosa: tdry; no mucosal lesions  Floor of Mouth: normal, no lesions, salivary ducts patent  Tongue: moist, normal mobility, no lesions  Palate: soft and hard palates without lesions or ulcers  Oropharynx: tonsils absent; no lesions  Nasopharynx, Hypopharynx, and Larynx  Indirect:          Thick mucous; post larynx normal  Neck  Inspection and Palpation: right sided hockey incision healed; pedicle to PMMF palpated --smaller; marked firm submental edema  Larynx and Trachea: normal position; normal crepitus  Thyroid: no tenderness, enlargement or nodules  Submandibular Glands: no masses or tenderness  Lymphatic:  Anterior, Posterior, Submandibular, Submental, Supraclavicular: no masses    Neurological  Cranial Nerves: 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 The nasal cavity, nasopharynx, oropharynx, hypopharynx and larynx were adequately visualized. The true vocal cords and arytenoids were examined during phonation and repose.        Operative Findings:     Nasal Cavity: Within normal limits  Nasopharynx: Within normal limits  Tongue Base: Within normal limits  Pharyngeal Walls: edema right lateral pharyngeal wall with overhanging of mucosa over right hemilarynx  Epiglottis / Aryepiglottic Folds: edematous epiglottis   "lingual and laryngeal surface ; edema bilateral AE folds; R>L  Pyriform Sinus: Within normal limits  Vocal Cords: Within normal limits  Arytenoids: Within normal limits; moderate edema bilaterally, R > L       Path:    1. LYMPH NODE, EXTERNAL JUGULAR VEIN, EXCISION:       --ONE LYMPH NODE WITH NO MORPHOLOGIC EVIDENCE OF MALIGNANCY (0/1).     2. 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   --LARGEST PART 2 SPECIMEN METASTATIC DEPOSIT: APPROXIMATELY 31    MILLIMETERS.       --EXTRANODAL EXTENSION:  PRESENT.       --NO PERINEURAL INVASION IDENTIFIED.       --SKELETAL MUSCLE INVOLVED BY METASTATIC SQUAMOUS CELL CARCINOMA.   --PD-L1 STUDY AND EBV IMMUNOHISTOCHEMISTRY ARE PENDING AND WILL BE    REPORTED IN ADDENDA.     3. RIGHT NECK, FURTHER DESIGNATED "LEVELS 4 AND 5," DISSECTION:   --ONE OUT OF NINETEEN LYMPH NODES IS POSITIVE FOR METASTATIC    KERATINIZING SQUAMOUS CELL CARCINOMA         (1/19).   --LARGEST PART 3 SPECIMEN METASTATIC DEPOSIT: APPROXIMATELY 8    MILLIMETERS.       --EXTRANODAL EXTENSION:  PRESENT.       --NO PERINEURAL INVASION IDENTIFIED.       --SKELETAL MUSCLE WITH NO MORPHOLOGIC EVIDENCE OF MALIGNANCY.     Human Papillomavirus (HPV) Testing:   p16 Immunohistochemistry (IHC) as a Surrogate for Transcriptionally    Active High-Risk HPV: NEGATIVE (no staining identified).   EBV negative  PDL 1 positive (85)    Records reviewed: Dr Wallace 11/18/2022  Assessment and Plan:  Symptomatically, the patient is doing very well and I see no evidence for tumor recurrence on history, physical exam or current PET-CT scan.  On December 8, the patient will be seeing Dr. Sellers in follow up, and according to the patient he is getting his head and neck followup through Dr. Sellers rather than from your office.  According to the patient, Dr. Haddad " will be repeating the CT scan of the chest and possibly the neck in three or four months.  I have asked to see the patient in March and he will return with a TSH to rule out radiation-related hypothyroidism (this would only be done if it had not already been done through Dr. Sellers's or Dr. Haddad's office).     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 I am unable to see recent lab work in Epic. If no TSH ordered, Dr Wallace will likely order at that time  Re-refer to PT for lymphedema therapy  Re-referred to [facility] ordered  [facility]  Patient encouragred to decrease alcohol intake  F/U 2-3 months

## 2023-02-14 ENCOUNTER — TELEPHONE (OUTPATIENT)
Dept: HEMATOLOGY/ONCOLOGY | Facility: CLINIC | Age: 68
End: 2023-02-14
Payer: MEDICARE

## 2023-02-14 NOTE — TELEPHONE ENCOUNTER
Noted that pt has scheduled his MBSS, called and assisted pt's wife scheduling f/u appt with Dr Sellers.

## 2023-02-14 NOTE — TELEPHONE ENCOUNTER
I spoke with the patient to schedule a ST and PT reassessment per Dr. Sellers. Patient verbalized acceptance of each appt and location was reviewed.

## 2023-02-23 ENCOUNTER — CLINICAL SUPPORT (OUTPATIENT)
Dept: REHABILITATION | Facility: HOSPITAL | Age: 68
End: 2023-02-23
Payer: MEDICARE

## 2023-02-23 DIAGNOSIS — Z85.89 HISTORY OF HEAD AND NECK CANCER: ICD-10-CM

## 2023-02-23 DIAGNOSIS — R13.10 DYSPHAGIA, UNSPECIFIED TYPE: ICD-10-CM

## 2023-02-23 DIAGNOSIS — Z85.89 HISTORY OF HEAD AND NECK CANCER: Primary | ICD-10-CM

## 2023-02-23 DIAGNOSIS — I89.0 LYMPHEDEMA: Primary | ICD-10-CM

## 2023-02-23 DIAGNOSIS — R13.10 DIFFICULTY SWALLOWING: ICD-10-CM

## 2023-02-23 DIAGNOSIS — I89.0 LYMPHEDEMA: ICD-10-CM

## 2023-02-23 PROCEDURE — 97163 PT EVAL HIGH COMPLEX 45 MIN: CPT | Mod: PN

## 2023-02-23 NOTE — PATIENT INSTRUCTIONS
You have Rock tape applied to anterior neck to improve lymphatic flow. You can keep in place unless it irritates skin up to 72 hours then remove.   Continue to sleep at incline and began wearing fabricated compression garment on neck when you sleep.    Next tx we will review self manual lymphatic drainage, please review this handout before.   Self Manual Lymphatic Drainage Face, Head and Neck    For patients who were treated for head and neck cancer with:    Surgery to remove a tumour(s) or lymph nodes   Radiation therapy    Read this resource to learn:   what is your lymphatic system   what lymphatic self-massage is and why it is important   how to do lymphatic self-massage step-by-step    What is your lymphatic system?  Your lymphatic system filters and removes extra fluid and waste from your body. It plays an important role in your immune function. Your lymphatic system is made up of many lymph nodes that are connected together by lymph vessels.     Your lymph nodes are bean-shaped organs that are found all over your body. Large groups or chains of lymph nodes can be found in your neck, under your arms and in your groin (see the image below).     Surgery or radiation to your lymph nodes damage your lymph nodes and vessels. This damage prevents fluid from flowing well and causes swelling. Swelling from damaged lymph nodes and vessels is called lymphedema    What is lymphatic self-massage and why is it important?  Lymphatic self-massage is a gentle skin massage where the skin is gently stretched and released along lymph pathways. Lymphatic self-massage helps move extra fluid from swollen areas damaged by cancer treatment. This extra fluid can be moved into an area where the lymph nodes are working well.    Lymphatic self-massage can help move extra fluid away from:    areas of your face that have had treatment   areas of your neck that have had treatment    Lymphatic self-massage can help to move extra fluid to:     lymph vessels and lymph nodes in areas of your face or neck not affected by treatment   lymph vessels and lymph nodes in your underarms    How to do lymphatic self-massage   Keep your hands soft and relaxed. Use a light pressure on your skin. The pressure of your hands should be just enough to gently stretch the skin. Only stretch the skin as far as it can go naturally without causing pain. Release the pressure and let your skin come back as it was. If you can feel your muscles under your skin you are pressing too hard.   Use the flat part (palms) of your hands instead of your fingertips. Your palms allow more contact with the skin to stimulate (pump) the lymph vessels.   Massage towards areas of your body that have not been treated for cancer such as your chest and underarms.   Make sure you are in a comfortable position. You can self-massage while sitting, standing or lying down. Choose a position that is most comfortable for you.   Massage when you are comfortably warm or when you are in a nice, warm room. If your muscles are warm, they are more flexible.   Do self-massage regularly. You can use self-massage as time to relax, breathe, and take care of yourself.    What to avoid   Do not strain your shoulders, neck, arm or hand   Do not self-massage if it causes pain   Do not do self-massage if you have an infection in the area that has swelling    Do not do self-massage if you think you have an infection. Infections can occur in your head, neck or face where your lymph nodes have been removed or you have had radiation.     Signs of an infection may include:    Swelling and redness of the skin. This redness can quickly spread.   Pain or soreness in your head, face or neck where you had treatment.   Warm or hot feeling in your head, face or neck where you had treatment.   Fever or chills.   Feeling unwell.    If you think you have an infection go to:   Your family doctor   [facility]   Urgent Care Clinic    Emergency department    If you have had an infection, only start self-massage again when you have finished your antibiotics (medicine). Or if your doctor says it is okay to start again.    Try different ways to make self-massage a part of your routine. Try self-massage while you are watching TV or having a shower so it does not take time away from your day. Try to make self-massage a time for yourself. Or make it a part of your routine for relaxing    Below are the steps for doing a lymphatic self-massage. Follow the instructions closely. Talk to your health care team if you have any questions.    Deep Breathing    Deep breathing is an important part of your self-care. Deep breathing works like a pump in your body. This pump helps the lymph nodes and vessels move fluid. You can practice deep breathing at any time!   What to do:    Place the palms of both hands on your stomach.   Take a deep breathe in through your nose until your stomach pushes against your hands.   Breathe out slowly through pursed lips (like you are blowing out candles). Then let your stomach go flat.   Repeat 5 times. Take a short rest between each breath so you do not feel dizzy       2. Stretch and release the skin at the front of your neck    This motion helps lymph fluid drain back to your heart. You can massage one side at a time or both sides at the same time. You may find it easier to cross your hands if you are doing both at the same time.   What to do:    Place the flats of your 2nd and 3rd fingers on either side of your neck just above your collarbone.   Massage down and inwards toward your collarbone. Always keep your fingers above your collarbone. Start massage on the area of your neck that is close to your shoulder and gently stretch the skin towards the middle of your neck.   Gently stretch the skin just as far as it goes without pain. Then let go of the skin.   This massage will look like 2 letter Js facing one another.   Repeat  10-15 times on each side.     3. Prepare your underarm lymph nodes    This prepares the lymph vessels and nodes under your arm to take in lymph fluid from your face and neck. Place your arm in a comfortable position. Your arm should be slightly raised and supported. You may want to place your arm on an armrest or  table for comfort.   What to do:    Place your palm of your opposite hand against your underarm.   Gently pull up and in toward your body, then release. Pause for a moment and then start again. Repeat 10-15 times   Do the same pumping on your other underarm. Repeat 10-15 times.       4. Stretch and release the skin from your chest to your underarm    Place you hand on your collarbone. Move your hand down your chest in half circles toward your underarm. Massage your chest to help reduce swelling. This massage will move the lymph fluid from your neck and chest to your underarm lymph vessels and nodes.   What to do:    Place your hand over your collar bone   Gently stretch the skin (not muscles) down your chest and towards your underarm. Then let go of the skin. Pause for a moment. Now repeat this massage stroke as you gradually move your hand down your chest towards your underarm.   Repeat this massage 10-15 times.     5. Stretch and release the skin from the front of your neck to your chest    Massage the front of your neck to help reduce swelling. This massage will move the lymph fluid from your neck to your chest. Place your hand on the front of your neck where you have swelling. Move your hand down your neck towards your collar bone and chest.     What to do:    Place your hand over the swelling at the front of your neck   Gently stretch the skin (not muscles) towards your collarbone. Then let go of the skin. Pause for a moment. Now repeat this massage stroke as you gradually move your hand past your collar bone and down your chest.   Repeat this massage 10-15 times.     6. Stretch and release the skin  at the side of your neck    Massage the side of your neck to lower or prevent the swelling in your face and neck. This massage helps stimulate (pump) the vessels at the side of your neck. Do not massage both sides of your neck at the same time if you have radiation treatment.   What to do:    Place your hands flat on the side of your neck   Gently stretch the skin away from your face and down. Then release.   Massage your neck and side of the face in a slow and gentle way.   Repeat 10-15 times.     7. Stretch and release the skin on the back of your neck    Massage the back of your neck to lower or prevent the swelling in your face and neck. This massage helps stimulate (pump) the vessels at the back of your neck.   What to do:    Place the palms of your hands on the back of your neck, just belowyour hairline.   Stretch the skin towards your spine and then down towards your back.   Repeat 10-15 times.       8. Massage your scar     Massage your scar. This massage is only if you have had surgery. Do not massage until three weeks after surgery. Do not massage until all staples and clips have been removed. Your scar may feel very sensitive, tight or itchy. Scar massage will help reduce these feelings.This massage helps soften the scar and allows better blood flow to the area. Scar massage should always be pain-free. Do not use oil while doing the scar massage. Apply any lotions or oils after the massage.    What to do:    Place the palm of your hand over the scar.    Move up and down in a zigzag pattern or [place] pattern along the scar. See pictures above for help.   Apply firm but gentle pressure while moving along the scar. Try to move the skin.   If possible, gently lift the skin along the scar.   Repeat 5 or 6 times on the scar.   Now place your fingertips just above the scar. Gently stretch the skin away from the scar and release.   Repeat 5 times.   Place your fingertips below the scar. Gently stretch the skin  away from the scar. Then release the skin.   Repeat 5 times. If your skin and swollen tissue in your neck or face feels hard, ask your therapist to show you gentle kneading techniques to help soften the firm tissue.    Here is how you will soften the firm tissue on your own at home:   Gently place the pads of your fingers on the tissue or skin that feels firm or hard.   Gently press down with the pads of your fingers and let go. As you release the pressure, move your fingers down slightly.    Repeat 10-15 times in one area. Move to another area and repeat.     9. Massage for face swelling     Your therapist will draw arrows on the image. This will show you the   way you should do your self-massage.     10. Massage for swelling inside the mouth    Your therapist will draw arrows on the image. This will show you the way you should do your self-massage.   Do not massage inside your mouth if you have any sores   or cuts or pain.   Make sure your hand is clean before you start to massage inside your mouth.     11. Stretch and release the skin at the front of your neck    This motion helps lymph fluid drain back to your heart. You can massage 1 side at a time or both sides at the same time. You may find it easier to cross your hands if you are doing both at the same time.   What to do:    Place the flats of your 2nd and 3rd fingers on either side of your neck just above your collarbone.   Massage down and inwards toward your collarbone. Always keep your fingers above your collarbone. Start massage on the area of your neck that is close to your shoulder and gently stretch the skin towards the middle of your neck.   Gently stretch the skin just as far as it goes without pain. Then let go of the skin.   This massage will look like 2 letter Js facing one another.   Repeat 15 times on each side.
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
## 2023-02-24 ENCOUNTER — TELEPHONE (OUTPATIENT)
Dept: HEMATOLOGY/ONCOLOGY | Facility: CLINIC | Age: 68
End: 2023-02-24
Payer: MEDICARE

## 2023-02-24 NOTE — TELEPHONE ENCOUNTER
Spoke with the patients' wife, Nohemi, and scheduled more PT appts. She voiced acceptance.     ----- Message from Juan Durán MA sent at 2/23/2023  1:59 PM CST -----  Regarding: Waitlist #5  2x week x 6 weeks lymph

## 2023-02-28 ENCOUNTER — CLINICAL SUPPORT (OUTPATIENT)
Dept: REHABILITATION | Facility: HOSPITAL | Age: 68
End: 2023-02-28
Payer: MEDICARE

## 2023-02-28 DIAGNOSIS — R13.19 OTHER DYSPHAGIA: Primary | ICD-10-CM

## 2023-02-28 DIAGNOSIS — R13.19 OTHER DYSPHAGIA: ICD-10-CM

## 2023-02-28 PROCEDURE — 92610 EVALUATE SWALLOWING FUNCTION: CPT | Mod: PN

## 2023-03-01 NOTE — PROGRESS NOTES
"[facility]  Speech and Language Therapy Evaluation    Date: 2/28/2023    Name: Seven Freire  MRN: [id number]  Therapy Diagnosis:   Encounter Diagnosis   Name Primary?    Other dysphagia      Physician: Juanita Sellers MD  Physician Orders: Ambulatory referral to   Medical Diagnosis: Dysphagia, s/p chemoradiation for H&N CA    Visit #:   1    Date of Evaluation: 02/28/23  Insurance Authorization Period: requires auth  Plan of Care Certification:  02/28/23 to 04/28/23     Procedure Min.   Swallow and Oral Function Evaluation   50 min          Time In: 1010   Time Out: 1100   Total Billable Time: 50 min    Precautions:Standard    Subjective     Date of Onset: 12/2022  History of Current Condition:   Pt reports he was experiencing increased difficulty swallowing over the holidays. He reports he felt like food and pills were "hanging up in my throat". He reports he had to use a liquid wash to clear or "hack it up and reswallow". He reports this difficulty decreased his intake and appetite and he experienced weight loss at this time. He also reports he was drinking beer heavily at this time which he feels may have impacted his oral intake. He reports he is no longer experiencing difficulty swallowing and has since regained weight. He denies sensation of food or liquid going down the wrong way and reports no current difficulty with swallowing regular consistencies (shrimp poboy last night, fajitas) or pills.  Past Medical History: Seven Freire  has a past medical history of Cancer (01/2022), Coronary artery disease, Depression, History of wheezing, Hypercholesteremia, and Hypertension.  Seven Freire  has a past surgical history that includes Coronary angioplasty with stent (2015); TONSILLECTOMY, ADENOIDECTOMY (Bilateral, 1/28/2022); Esophagoscopy (N/A, 1/28/2022); Nasal endoscopy (N/A, 1/28/2022); Direct laryngoscopy (N/A, 1/28/2022); Biopsy of pharynx (N/A, 1/28/2022); " Radical neck dissection (Right, 3/2/2022); and Insertion of tunneled central venous catheter (CVC) with subcutaneous port (Left, 3/24/2022).  Medical Hx and Allergies: Seven has a current medication list which includes the following prescription(s): albuterol, amlodipine, bupropion, bupropion, clopidogrel, dexamethasone, dronabinol, duexis, ezetimibe, irbesartan, magnesium oxide, methylphenidate hcl, mupirocin, nicotine, ondansetron, trazodone, varenicline, vitamin d, vitamin e, and xtampza er.   Review of patient's allergies indicates:   Allergen Reactions    Unasyn [ampicillin-sulbactam] Hallucinations     Prior Therapy:  Pt was scheduled for pre XRT counseling and follow up through treatment, but did not attend.   Social History:  Pt lives with his wife. He is retired.   Current Level of Function: Independent, wife supportive  Pain:   0/10  Pain Location / Description: n/a  Nutrition:  regular consistency and thin liquids  Patient's Therapy Goals:  no stated goals    Objective       Cranial Nerve Examination  Cranial Nerve 5: Trigeminal Nerve  Motor Jaw Posture at rest: WFL  Mandible Elevation/Depression: WFL  Mandible lateralization: WFL  Abnormal movement: absent Interpretation: WFL  Pt able to open mouth to 4cm/3 fingers   Sensory Forehead: WFL  Cheek: WFL  Jaw: WFL  Facial Pain: None noted Interpretation: [facility] [place]     Cranial Nerve 7: Facial Nerve  Motor Facial Symmetry: WFL  Wrinkle Forehead: WFL  Close eyes tightly: WFL  Labial Protrusion: WFL  Labial Retraction: WFL  Abnormal movement: absent Interpretation: [facility] [place]   Sensory Formal testing not completed. Pt endorses changes in taste s/p XRT      Cranial Nerves IX and X: Glossopharyngeal and Vagus Nerves  Motor Palatal Symmetry (Rest): WNL  Palatal Symmetry (Movement): WNL  Cough: Perceptually strong  Voice Prior to PO intake: Clear, gravely voice  Resonance: Normal  Abnormal movement: absent Interpretation: [facility] [place]     Cranial Nerve XII: Hypoglossal Nerve  Motor  Tongue at rest: WNL  Lingual Protrusion: WNL  Lingual Protrusion against Resistance: WNL  Lingual Lateralization: WNL  Abnormal movement: absent Interpretation: WNL     Pt reports Rad Onc had provided him with tongue depressors following XRT to perform mouth opening exercises but he reports he did not follow up with this at home. He also reports he stopped going to PT sessions for lymphedema following XRT.     EATING ASSESSMENT TOOL (EAT-10):   The EAT-10 is a questionnaire given to the patient to  his swallowing function and to document the initial dysphagia severity and monitor the treatment response in persons with a wide array of swallowing disorders.    Seven ranked his/her swallowing function as a 5/40     Interpretation: Score of greater than 3 is indicative of a swallowing disorder (Radha et al., 2008); higher scores correlate with increased penetration-aspiration  scale scores, and scores greater than 15 results in the patient being 2.2 times more likely to aspirate (Clarence et al., 2015)    References:   ELIZABETH Garcia., JOJO Sinha., AMAN Gonzales., CELINE Flores., Shira, COLETTE COSTELLO., CELINE Solis, & LINNEA Guillen (2008). Validity and reliability of the Eating Assessment Tool (EAT-10). Annals of Otology, Rhinology & Laryngology, 117(12), 919-924. https://doi.org/10.1177/[email or web address]  JOJO Lima., JESSICA Sandoval., CELINE Connors., Mirian, M. A., & Radha, P. LEONIE. (2015). The ability of the 10-item eating assessment tool (EAT-10) to predict aspiration risk in persons with dysphagia. Annals of Otology, Rhinology & Laryngology, 124(5), 351-354. https://doi.org/10.1177/[email or web address]     Reflux: Pt reports occasional symptoms of reflux. He does not take medication.    Bedside Swallow Evaluation:    Other information:  Volitional Swallow:  Difficult to palpate laryngeal rise due to submental and neck lymphedema  Mucosal Quality: No abnormal findings, pt reports intermittent dry mouth  Secretion Management:  "WFL  Significant lymphedema submentally and in neck R>L  Dentition: Missing upper and lower molars      Consistencies assessed:  Water by cup  Applesauce   Diced Peaches in liquid  Cracker        Oral Phase:  Adequate lip seal without loss of bolus. Mildly reduced bolus preparation and mastication due to missing molars. Pt presents with munching chewing pattern with front teet.. AP propulsion appeared timely.. No oral residue noted on any consistency.     Pharyngeal Phase:  Laryngeal lift is difficult to palpate due to lymphedema. Bolus transit time appears timely. No overt s/s of oropharyngeal distress were noted. Voice remained clear throughout. No evidence of multiple swallows. No c/o of food sticking throughout evaluation.     Impressions:  Pt presents with adequate swallow function for safe oral intake with regular to soft consistency.Pt reports chronic side effects from XRT - intermittent dry mouth and changes in taste. Pt reports he feels his appetite is "ok". He reports he typically only eats 2 meals per day and that is a long term preference. He reports he is working to ensure he is meeting nutritional needs by weighing himself twice weekly and supplementing oral intake with Boost.  He reports he has recently reduced his ETOH intake to 7 to 8 beers daily.    MBSS scheduled for 03/29/23  Pt currently evaluated by PT for lymphedema and scheduled for treatment.   Encouraged patient to follow up with visits to manage lymphedema and decrease potential for worsening swallow function.    Education: Education with pt and his wife regarding current swallow function and associated risks of dysphagia. Discussed diet as tolerated but optimal diet consistency would be soft, moist, easy to chew foods. Recommended to continue monitoring weight and to supplement nutrition with supplements such as Boost as needed. Pt and wife verbalized understanding.  Education regarding recommendation to complete MBSS as scheduled to " further assess swallow competency and to guide further treatment if indicated.         Assessment     Seven presents to [facility] s/p medical diagnosis of  dysphagia. He presents with functional oropharyngeal swallow at this time. Will determine need for further dysphagia intervention following MBSS.    Rehab Potential: fair to good,  follow through and attendance with previous therapy schedule/recommendations  Pt's spiritual, cultural and educational needs considered and pt agreeable to plan of care and goals.    Long/Short Term Goals   TBD following MBS      Plan     Plan of Care Certification Period: 2023 to 23    Recommended Treatment Plan:  Evaluation only at this time. Further need for dysphagia treatment to be determined following MBS on 3/29/23      Therapist's Name:   Tory Grossman CCC-SLP   2023    Physician Name: _______________________________    Physician Signature: ____________________________

## 2023-03-02 ENCOUNTER — CLINICAL SUPPORT (OUTPATIENT)
Dept: REHABILITATION | Facility: HOSPITAL | Age: 68
End: 2023-03-02
Payer: MEDICARE

## 2023-03-02 DIAGNOSIS — Z85.89 HISTORY OF HEAD AND NECK CANCER: ICD-10-CM

## 2023-03-02 DIAGNOSIS — R13.10 DYSPHAGIA, UNSPECIFIED TYPE: ICD-10-CM

## 2023-03-02 DIAGNOSIS — I89.0 LYMPHEDEMA: ICD-10-CM

## 2023-03-02 PROCEDURE — 97140 MANUAL THERAPY 1/> REGIONS: CPT | Mod: PN

## 2023-03-02 PROCEDURE — 97110 THERAPEUTIC EXERCISES: CPT | Mod: PN

## 2023-03-02 NOTE — PROGRESS NOTES
[facility]  Physical Therapy Progress Note  Lymphedema Therapy    Visit Date: 3/2/2023    Name: Seven Freire  MRN: [id number]  Therapy Diagnosis:   Encounter Diagnoses   Name Primary?    Lymphedema     History of head and neck cancer     Dysphagia, unspecified type      Physician: Juanita Sellers MD    Evaluation Date: 2/23/23  Visit #/Visits authorized: pending   Authorization Dates: pending  Plan of Care Expiration: 10 visits/ 30days  MD order: PT Eval and Treat       Visit: 2 / 18  PTA Visit: -- / 5  Time In: 11:05 AM  Time Out: 12:00 PM  Total Billable Time: 55 minutes    Precautions: Standard, cancer , lymphedema    Subjective     Pt reports: Did fine after the assessment. The tape that was put on my neck, I took it off on Saturday, didn't bother me. I have 2 types of compression garments at home but haven't tried wearing them in a while. Do use my pump at home sometimes 1x a day sometimes twice, sometimes I don't get to use it.     Pain: none  Location:     Objective     TREATMENT  Manual Therapy to develop flexibility, extensibility, pliability, and contour for 45 minutes including:   Manual lymphatic drainage to bilateral short neck series, cervical terminus , axilla, sternal nodes, paravertebral nodes, submental nodes, pre- and retro auricular nodes with facial sequence,  AIA anastomosis , RUTH anastomosis , and rework accessing all watershed areas on trunk.  Lymphatouch applied to cervical terminus, lateral cervical triangle, submental region bilaterally, anterior neck set at 55mmHg for 15 minutes.  Rock tape applied to anchored at superior border of scapula extending to lateral and anterior neck/submental region, second strip anchored at right cervical terminus extending in weave pattern to anterior neck  to improve lymphangiomotorcity 10 min Pt instructed to remove within 72 hours or sooner if it begins to irritate skin.     Therapeutic Exercise to develop strength, ROM,  flexibility, and posture for 10 minutes including:  Deep diaphragm breathing  Supine Chin tucks  In supine Cervical rotations R/L  Seated scapular retractions        EDUCATION  Education provided:   - Progress towards goals   - Role of therapy   - Activity modification  - Lymphedema information (definitions, signs, symptoms, precautions), purpose of lymphedema physical therapy and the benefits/risks of treatment    Written Home Exercises Provided:  Provided pt with additional handout of self MLD techniques, reviewed with pt steps 1-3, will continue with remaining steps during following visits. .  Exercises were reviewed and Seven was able to demonstrate them prior to the end of the session.  Seven demonstrated good  understanding of the education provided.   See EMR under Patient Instructions for exercises provided 3/2/2023.    Assessment     Pt tolerated today's tx well with no visible or reported adverse affects. Positive tissue response to MLD, lympha touch and exercise with softening and increased wrinkling noted. Began reviewed of self MLD techniques with proper handplacement, education on lymphemedema and benefit of performing self MLD at home with importance of maintaining consistancy with home program for best results. Encouraged pt to begin walking on treadmill at home starting with 10 minutes 3-4x week to work on improving endurance and fatigue.       Seven Is progressing well towards his goals. Pt will continue to benefit from skilled outpatient physical therapy to address the deficits listed in the problem list box on initial evaluation, provide pt/family education and to maximize pt's level of independence in the home and community environment.   Pt prognosis is Good.     Patient reports having difficulty with eating and has lost some weight. Dr. Sellers wanted me to return to PT. Pt has decreased mandibular depression limiting opening of mouth, scapular stabilization weakness more noted on R as compared to  L and fullness with decreased tissue pliability in submental/ anterior neck region.Poor level of endurance which could be improved.   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 Patient completed  chemotherapy 1x a week and 30 tx of radiation at [facility] in [place]. Pt primary complaint  Patient presents with lymphedema of the head/neck with fullness to submandibular region and anterior/lateral neck, increased pain, increased stiffness in the right shoulder and cervical range of motion, as well as difficulty performing overhead reaching with flexion and abduction ranges and placing the pt at rist of higher infection.    Plan of care discussed with patient: Yes  Pt's spiritual, cultural and educational needs considered and patient is agreeable to the plan of care and goals.    Anticipated Barriers for therapy: none    GOALS  Short Term Goals: (3 weeks)  1. Decrease cervical girth by 1 cm  2. Patient will demonstrate 100% knowledge of lymphedema precautions and signs of infection.   3. Patient demonstrates independence with Postural Awareness.   4. Patient will perform self lymph drainage techniques.  5. Patient demonstrates independence with HEP.         Long Term Goals: (6 weeks)  1. Decrease cervical girth by 2 cm.  2. Patient will perform self lymph drainage techniques independently.  3. Patient to tomas/doff compression garment.   4. Patient demonstrates increased B scapular stabilization to at least 4+/5   5. Patient demonstrates increased mandibular depression to 50 mm   6. Patient demonstrates increased strength BUE's to 4+/5 or greater to improve tolerance to functional activities pain free.   7. Patient to report improvement in overall endurance, participating in 30 minute walking program 3-5x week.     Plan     Continue with established POC  working toward PT goals.    Sheryl Osorio, PT , CLT

## 2023-03-02 NOTE — PATIENT INSTRUCTIONS
Self Manual Lymphatic Drainage Face, Head and Neck    For patients who were treated for head and neck cancer with:    Surgery to remove a tumour(s) or lymph nodes   Radiation therapy    Read this resource to learn:   what is your lymphatic system   what lymphatic self-massage is and why it is important   how to do lymphatic self-massage step-by-step    What is your lymphatic system?  Your lymphatic system filters and removes extra fluid and waste from your body. It plays an important role in your immune function. Your lymphatic system is made up of many lymph nodes that are connected together by lymph vessels.     Your lymph nodes are bean-shaped organs that are found all over your body. Large groups or chains of lymph nodes can be found in your neck, under your arms and in your groin (see the image below).     Surgery or radiation to your lymph nodes damage your lymph nodes and vessels. This damage prevents fluid from flowing well and causes swelling. Swelling from damaged lymph nodes and vessels is called lymphedema    What is lymphatic self-massage and why is it important?  Lymphatic self-massage is a gentle skin massage where the skin is gently stretched and released along lymph pathways. Lymphatic self-massage helps move extra fluid from swollen areas damaged by cancer treatment. This extra fluid can be moved into an area where the lymph nodes are working well.    Lymphatic self-massage can help move extra fluid away from:    areas of your face that have had treatment   areas of your neck that have had treatment    Lymphatic self-massage can help to move extra fluid to:    lymph vessels and lymph nodes in areas of your face or neck not affected by treatment   lymph vessels and lymph nodes in your underarms    How to do lymphatic self-massage   Keep your hands soft and relaxed. Use a light pressure on your skin. The pressure of your hands should be just enough to gently stretch the skin. Only stretch the skin as  far as it can go naturally without causing pain. Release the pressure and let your skin come back as it was. If you can feel your muscles under your skin you are pressing too hard.   Use the flat part (palms) of your hands instead of your fingertips. Your palms allow more contact with the skin to stimulate (pump) the lymph vessels.   Massage towards areas of your body that have not been treated for cancer such as your chest and underarms.   Make sure you are in a comfortable position. You can self-massage while sitting, standing or lying down. Choose a position that is most comfortable for you.   Massage when you are comfortably warm or when you are in a nice, warm room. If your muscles are warm, they are more flexible.   Do self-massage regularly. You can use self-massage as time to relax, breathe, and take care of yourself.    What to avoid   Do not strain your shoulders, neck, arm or hand   Do not self-massage if it causes pain   Do not do self-massage if you have an infection in the area that has swelling    Do not do self-massage if you think you have an infection. Infections can occur in your head, neck or face where your lymph nodes have been removed or you have had radiation.     Signs of an infection may include:    Swelling and redness of the skin. This redness can quickly spread.   Pain or soreness in your head, face or neck where you had treatment.   Warm or hot feeling in your head, face or neck where you had treatment.   Fever or chills.   Feeling unwell.    If you think you have an infection go to:   Your family doctor   Walk-in Clinic   Urgent Care Clinic   Emergency department    If you have had an infection, only start self-massage again when you have finished your antibiotics (medicine). Or if your doctor says it is okay to start again.    Try different ways to make self-massage a part of your routine. Try self-massage while you are watching TV or having a shower so it does not take time away from  your day. Try to make self-massage a time for yourself. Or make it a part of your routine for relaxing    Below are the steps for doing a lymphatic self-massage. Follow the instructions closely. Talk to your health care team if you have any questions.    Deep Breathing    Deep breathing is an important part of your self-care. Deep breathing works like a pump in your body. This pump helps the lymph nodes and vessels move fluid. You can practice deep breathing at any time!   What to do:    Place the palms of both hands on your stomach.   Take a deep breathe in through your nose until your stomach pushes against your hands.   Breathe out slowly through pursed lips (like you are blowing out candles). Then let your stomach go flat.   Repeat 5 times. Take a short rest between each breath so you do not feel dizzy       2. Stretch and release the skin at the front of your neck    This motion helps lymph fluid drain back to your heart. You can massage one side at a time or both sides at the same time. You may find it easier to cross your hands if you are doing both at the same time.   What to do:    Place the flats of your 2nd and 3rd fingers on either side of your neck just above your collarbone.   Massage down and inwards toward your collarbone. Always keep your fingers above your collarbone. Start massage on the area of your neck that is close to your shoulder and gently stretch the skin towards the middle of your neck.   Gently stretch the skin just as far as it goes without pain. Then let go of the skin.   This massage will look like 2 letter Js facing one another.   Repeat 10-15 times on each side.     3. Prepare your underarm lymph nodes    This prepares the lymph vessels and nodes under your arm to take in lymph fluid from your face and neck. Place your arm in a comfortable position. Your arm should be slightly raised and supported. You may want to place your arm on an armrest or  table for comfort.   What to do:     Place your palm of your opposite hand against your underarm.   Gently pull up and in toward your body, then release. Pause for a moment and then start again. Repeat 10-15 times   Do the same pumping on your other underarm. Repeat 10-15 times.       4. Stretch and release the skin from your chest to your underarm    Place you hand on your collarbone. Move your hand down your chest in half circles toward your underarm. Massage your chest to help reduce swelling. This massage will move the lymph fluid from your neck and chest to your underarm lymph vessels and nodes.   What to do:    Place your hand over your collar bone   Gently stretch the skin (not muscles) down your chest and towards your underarm. Then let go of the skin. Pause for a moment. Now repeat this massage stroke as you gradually move your hand down your chest towards your underarm.   Repeat this massage 10-15 times.     5. Stretch and release the skin from the front of your neck to your chest    Massage the front of your neck to help reduce swelling. This massage will move the lymph fluid from your neck to your chest. Place your hand on the front of your neck where you have swelling. Move your hand down your neck towards your collar bone and chest.     What to do:    Place your hand over the swelling at the front of your neck   Gently stretch the skin (not muscles) towards your collarbone. Then let go of the skin. Pause for a moment. Now repeat this massage stroke as you gradually move your hand past your collar bone and down your chest.   Repeat this massage 10-15 times.     6. Stretch and release the skin at the side of your neck    Massage the side of your neck to lower or prevent the swelling in your face and neck. This massage helps stimulate (pump) the vessels at the side of your neck. Do not massage both sides of your neck at the same time if you have radiation treatment.   What to do:    Place your hands flat on the side of your neck   Gently  stretch the skin away from your face and down. Then release.   Massage your neck and side of the face in a slow and gentle way.   Repeat 10-15 times.     7. Stretch and release the skin on the back of your neck    Massage the back of your neck to lower or prevent the swelling in your face and neck. This massage helps stimulate (pump) the vessels at the back of your neck.   What to do:    Place the palms of your hands on the back of your neck, just belowyour hairline.   Stretch the skin towards your spine and then down towards your back.   Repeat 10-15 times.       8. Massage your scar     Massage your scar. This massage is only if you have had surgery. Do not massage until three weeks after surgery. Do not massage until all staples and clips have been removed. Your scar may feel very sensitive, tight or itchy. Scar massage will help reduce these feelings.This massage helps soften the scar and allows better blood flow to the area. Scar massage should always be pain-free. Do not use oil while doing the scar massage. Apply any lotions or oils after the massage.    What to do:    Place the palm of your hand over the scar.    Move up and down in a zigzag pattern or [place] pattern along the scar. See pictures above for help.   Apply firm but gentle pressure while moving along the scar. Try to move the skin.   If possible, gently lift the skin along the scar.   Repeat 5 or 6 times on the scar.   Now place your fingertips just above the scar. Gently stretch the skin away from the scar and release.   Repeat 5 times.   Place your fingertips below the scar. Gently stretch the skin away from the scar. Then release the skin.   Repeat 5 times. If your skin and swollen tissue in your neck or face feels hard, ask your therapist to show you gentle kneading techniques to help soften the firm tissue.    Here is how you will soften the firm tissue on your own at home:   Gently place the pads of your fingers on the tissue or skin that  feels firm or hard.   Gently press down with the pads of your fingers and let go. As you release the pressure, move your fingers down slightly.    Repeat 10-15 times in one area. Move to another area and repeat.     9. Massage for face swelling     Your therapist will draw arrows on the image. This will show you the   way you should do your self-massage.     10. Massage for swelling inside the mouth    Your therapist will draw arrows on the image. This will show you the way you should do your self-massage.   Do not massage inside your mouth if you have any sores   or cuts or pain.   Make sure your hand is clean before you start to massage inside your mouth.     11. Stretch and release the skin at the front of your neck    This motion helps lymph fluid drain back to your heart. You can massage 1 side at a time or both sides at the same time. You may find it easier to cross your hands if you are doing both at the same time.   What to do:    Place the flats of your 2nd and 3rd fingers on either side of your neck just above your collarbone.   Massage down and inwards toward your collarbone. Always keep your fingers above your collarbone. Start massage on the area of your neck that is close to your shoulder and gently stretch the skin towards the middle of your neck.   Gently stretch the skin just as far as it goes without pain. Then let go of the skin.   This massage will look like 2 letter Js facing one another.   Repeat 15 times on each side.

## 2023-03-08 ENCOUNTER — CLINICAL SUPPORT (OUTPATIENT)
Dept: REHABILITATION | Facility: HOSPITAL | Age: 68
End: 2023-03-08
Payer: MEDICARE

## 2023-03-08 DIAGNOSIS — Z85.89 HISTORY OF HEAD AND NECK CANCER: ICD-10-CM

## 2023-03-08 DIAGNOSIS — R13.10 DYSPHAGIA, UNSPECIFIED TYPE: ICD-10-CM

## 2023-03-08 DIAGNOSIS — I89.0 LYMPHEDEMA: Primary | ICD-10-CM

## 2023-03-08 PROCEDURE — 97140 MANUAL THERAPY 1/> REGIONS: CPT | Mod: PN,CQ

## 2023-03-08 PROCEDURE — 97110 THERAPEUTIC EXERCISES: CPT | Mod: PN,CQ
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
## 2023-03-09 ENCOUNTER — TELEPHONE (OUTPATIENT)
Dept: HEMATOLOGY/ONCOLOGY | Facility: CLINIC | Age: 68
End: 2023-03-09
Payer: MEDICARE

## 2023-03-09 NOTE — TELEPHONE ENCOUNTER
Spoke with Nohemi and cancelled PT appt for to day as requested.    ----- Message from Dayana Hutchins sent at 3/9/2023  8:26 AM CST -----  Type: Needs Medical Advice  Who Called:  Nohemi(spouse)  Symptoms (please be specific):    How long has patient had these symptoms:    Pharmacy name and phone #:    Best Call Back Number: [phone]  Additional Information: Nohemi called to cancel Mr. Freire PT appt. for 3/9 at 11:00.

## 2023-03-15 ENCOUNTER — CLINICAL SUPPORT (OUTPATIENT)
Dept: REHABILITATION | Facility: HOSPITAL | Age: 68
End: 2023-03-15
Payer: MEDICARE

## 2023-03-15 DIAGNOSIS — I89.0 LYMPHEDEMA: Primary | ICD-10-CM

## 2023-03-15 DIAGNOSIS — Z85.89 HISTORY OF HEAD AND NECK CANCER: ICD-10-CM

## 2023-03-15 DIAGNOSIS — M25.9 SHOULDER JOINT DYSFUNCTION: ICD-10-CM

## 2023-03-15 PROCEDURE — 97110 THERAPEUTIC EXERCISES: CPT | Mod: PN

## 2023-03-15 PROCEDURE — 97535 SELF CARE MNGMENT TRAINING: CPT | Mod: PN

## 2023-03-15 PROCEDURE — 97140 MANUAL THERAPY 1/> REGIONS: CPT | Mod: PN

## 2023-03-15 NOTE — PROGRESS NOTES
[facility]  Physical Therapy Progress Note/Reassessment  Lymphedema Therapy    Visit Date: 3/15/2023    Name: Seven Freire  MRN: [id number]  Therapy Diagnosis:   No diagnosis found.    Physician: Juanita Sellers MD    Evaluation Date: 2/23/23  Reassess: 03/15/2023    Visit #/Visits authorized: pending   Authorization Dates: pending  Plan of Care Expiration: 10 visits/ 30days  MD order: PT Eval and Treat       Visit: 5 / 18  PTA Visit: 0 / 5  Time In: 1:05 PM  Time Out: 2:15 PM  Total Billable Time: 75 minutes    Precautions: Standard, cancer , lymphedema    Subjective     Pt reports: Still feel that swelling in my throat, like food has a hard time going today. I remember when I had the radiation treatment with the helment I always felt like it was too tight on my throat. The fullness at the neck feels mendez and firmer today, haven't used the pump 2 days in last week.  Have a swallow study coming up at [facility]. Have a appointment with Dr. Irwin on Monday.   Have been using the pump everyday usually in the afternoon, did miss about 2 days within the last week. I do have the neck compression next to my bed but I forget about it. I know I need to be better about using it. I am still sleeping on the incline, have the compression garment next to my bed but haven't been using it.    Pain: 6-7/10  Location: cervical spine    Objective     Shoulder Range of Motion:   ACTIVE ROM LEFT EVAL 4/26/2022 RIGHT EVAL 4/26/2022 LEFT Re-Assess  6/17/2022 RIGHT Re- Assess  06/17/2022 LEFT EVAL 2/23/2023 RIGHT EVAL 2/23/2023 LEFT  Reasses  03/15/2023 RIGHT   Reassess  03/15/2023   Flexion 170 155 164 150 160 deg 160 deg 165 165   Abduction 170 145 (P) 173 140 with paid during movement and  deg 170 deg 170 170   Extension WNL WNL WNL WNL         IR WNL WNL WNL WNL WFL WFL     ER WNL WNL 55 55 73 72 73 73      STRENGTH LEFT  EVAL 4/26/2022 RIGHT EVAL 4/26/2022 LEFT Re-Assess  6/17/2022 RIGHT  Re- Assess  06/17/2022 LEFT EVAL 2/23/2023 RIGHT EVAL 2/23/2023 LEFT Reassess  03/15/2023 RIGHT   Reassess  03/15/2023   Flexion 5/5 4/5 5/5 4/5 5/5 5/5 5/5 5/5   Abduction 5/5 4-/5 5/5 4-/5 5/5 4/5 5/5 4+/5   Extension 5/5 4/5 5/5 4/5 5/5 4+/5 5/5 4+/5   IR (neutral) 4+/5 4/5 5/5 4/5 5/5 5/5 5/5 5/5   ER (neutral) 4+/5 4/5 4+/5 4/5 5/5 4+/5 5/5 4+/5   Middle Trap 4+/5 4/5 4+/5 4/5 4+/5 4-/5 4+/5 4/4   Lower Trap 4+/5 4/5 4/5 4-/5 4/5 3+/5 4+/5 4/5      CERVICAL SPINE AROM:     eval 4/26/2022  Re-assess 6/17/2022 EVAL 2/23/23 Reassess  03/15/2023   Flexion: WNL  WNL WNL WNL   Extension: 20 deg 20 deg 35 deg 40   Left Sidebend: 18 deg 16 deg 15 deg 15   Right Sidebend: 20 deg 18 deg 10 deg 15   Left Rotation: 35 deg 38 deg 50 deg 50   Right Rotation: 40 deg 55 deg 56 deg 50   Mandibular depression   50 cm 35cm 40 mm   R lateral flexion and mandibular depression have reduced.      Girth Measurements (in centimeters)  LANDMARK Eval 4/26/2022 Re-assess 6/17/2022 EVAL 2/23/2023 Reassess  03/15/2023   2 in below earlobe 52.0 cm 49.0 cm 46.2 49.0   3 in below earlobe  50.0 cm 47.7 cm 44.2 47.4   4 in below earlobe 49.5 cm 45.0 cm 45.1 43.8   Increased girth of 2 out of 3 landmarks, increased firmness throughout submental region with fullness to right submandibular region.        TREATMENT  Manual Therapy to develop flexibility, extensibility, pliability, and contour for 45 minutes including:   Manual lymphatic drainage to bilateral short neck series, cervical terminus , axilla, sternal nodes, paravertebral nodes, submental nodes, pre- and retro auricular nodes with facial sequence,  AIA anastomosis , RUTH anastomosis , and rework accessing all watershed areas on trunk.  Lymphatouch applied to cervical terminus, lateral cervical triangle, submental region bilaterally, anterior neck set at 55mmHg for 15 minutes.  Reviewed with pt self MLD hand placements to cervical terminus, B axilla, lateral neck B  and from neck to chest  "(handout also provided again for review)  Self Care/Education/ADL training 10 minutes: Educated pt and wife of using all parts of Flexitouch (vest and head piece for max benefit) during entire treatment at home (was only using just head piece for a while.)  PT spoke with pts wife at length regarding increasing use of his pump from just in the evenings to mornings (when feeling most full), importance of performing self MLD daily  to increase lymphatic flow (encouraged pt to perform when in the shower, or whenever easiest to remember.) PT also reiterated to pt/wife benefit of wearing compression garment at night time to assist in decongesting and prevent return of fullness. Pt verbalized understanding of needing to improve his compliance for better results.   Pt states "I feel like always after PT treatment it feels softer and will continue to feel less swollen as the day goes on."      Deferred: Rock tape applied to anchored at superior border of scapula extending to lateral and anterior neck/submental region, second strip anchored at right cervical terminus extending in weave pattern to anterior neck  to improve lymphangiomotorcity 10 min Pt instructed to remove within 72 hours or sooner if it begins to irritate skin.     Therapeutic Exercise to develop strength, ROM, flexibility, and posture for 15 minutes including:  UBE L1 x 6' (alt direction ea minute)  Scap retractions with RTB 3 x 10  Straight arm pulldowns with RTB 3 x 10  Bilateral ER with RTB 2 x 10  Deep diaphragm breathing  Supine Chin tucks  In supine Cervical rotations R/L  Seated shoulder rolls x 10        EDUCATION  Education provided:   - Progress towards goals   - Role of therapy   - Activity modification  - Lymphedema information (definitions, signs, symptoms, precautions), purpose of lymphedema physical therapy and the benefits/risks of treatment    Written Home Exercises Provided:  Provided pt with additional handout of self MLD techniques, " reviewed with pt steps 1-3, will continue with remaining steps during following visits. .  Exercises were reviewed and Seven was able to demonstrate them prior to the end of the session.  Seven demonstrated good  understanding of the education provided.   See EMR under Patient Instructions for exercises provided 3/2/2023.    Assessment   Increased fullness and girth today with reassessment. Pt reports poor compliance with wearing night time compression, performing self MLD and minimal exercise at home. PT reiterated to patient and pts wife importance of consistency and compliance, pt admits to poor self motivation, lack of energy at home. Encouraged pt to begin walking on treadmill at home starting with 10 minutes 3-4x week to work on improving endurance and fatigue.  Pt tolerated today's tx well with no visible or reported adverse affects. Positive tissue response to MLD, lympha touch and exercise with softening and increased wrinkling noted. PT reviewed with patient and pts wife self MLD techniques with proper handplacement, education on lymphemedema and benefit of performing self MLD at home, utilizing compression pump 1-2x day with importance of maintaining consistancy with home program, wearing night time compression every night for best results.     Seven Is progressing well towards his goals. Pt will continue to benefit from skilled outpatient physical therapy to address the deficits listed in the problem list box on initial evaluation, provide pt/family education and to maximize pt's level of independence in the home and community environment.   Pt prognosis is Good.     Patient reports having difficulty with eating and has lost some weight. Dr. Sellers wanted me to return to PT. Pt has decreased mandibular depression limiting opening of mouth, scapular stabilization weakness more noted on R as compared to L and fullness with decreased tissue pliability in submental/ anterior neck region.Poor level of endurance  which could be improved.   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 Patient completed  chemotherapy 1x a week and 30 tx of radiation at [facility] in [place]. Pt primary complaint  Patient presents with lymphedema of the head/neck with fullness to submandibular region and anterior/lateral neck, increased pain, increased stiffness in the right shoulder and cervical range of motion, as well as difficulty performing overhead reaching with flexion and abduction ranges and placing the pt at rist of higher infection.    Plan of care discussed with patient: Yes  Pt's spiritual, cultural and educational needs considered and patient is agreeable to the plan of care and goals.    Anticipated Barriers for therapy: none    GOALS  Short Term Goals: (3 weeks) 03/15/2023  1. Decrease cervical girth by 1 cm (Not met, girth measures increased in 2 of 3 landmarks)  2. Patient will demonstrate 100% knowledge of lymphedema precautions and signs of infection.   3. Patient demonstrates independence with Postural Awareness.   4. Patient will perform self lymph drainage techniques. (Non-compliant)  5. Patient demonstrates independence with HEP.         Long Term Goals: (6 weeks) (IN progress)  1. Decrease cervical girth by 2 cm.  2. Patient will perform self lymph drainage techniques independently.  3. Patient to tomas/doff compression garment.   4. Patient demonstrates increased B scapular stabilization to at least 4+/5   5. Patient demonstrates increased mandibular depression to 50 mm   6. Patient demonstrates increased strength BUE's to 4+/5 or greater to improve tolerance to functional activities pain free.   7. Patient to report improvement in overall endurance, participating in 30 minute walking program 3-5x week.     Plan     Continue with established POC working  toward PT goals.    Sheryl Osorio, PT , CLT

## 2023-03-15 NOTE — PATIENT INSTRUCTIONS
Self Manual Lymphatic Drainage Face, Head and Neck    For patients who were treated for head and neck cancer with:    Surgery to remove a tumour(s) or lymph nodes   Radiation therapy    Read this resource to learn:   what is your lymphatic system   what lymphatic self-massage is and why it is important   how to do lymphatic self-massage step-by-step    What is your lymphatic system?  Your lymphatic system filters and removes extra fluid and waste from your body. It plays an important role in your immune function. Your lymphatic system is made up of many lymph nodes that are connected together by lymph vessels.     Your lymph nodes are bean-shaped organs that are found all over your body. Large groups or chains of lymph nodes can be found in your neck, under your arms and in your groin (see the image below).     Surgery or radiation to your lymph nodes damage your lymph nodes and vessels. This damage prevents fluid from flowing well and causes swelling. Swelling from damaged lymph nodes and vessels is called lymphedema    What is lymphatic self-massage and why is it important?  Lymphatic self-massage is a gentle skin massage where the skin is gently stretched and released along lymph pathways. Lymphatic self-massage helps move extra fluid from swollen areas damaged by cancer treatment. This extra fluid can be moved into an area where the lymph nodes are working well.    Lymphatic self-massage can help move extra fluid away from:    areas of your face that have had treatment   areas of your neck that have had treatment    Lymphatic self-massage can help to move extra fluid to:    lymph vessels and lymph nodes in areas of your face or neck not affected by treatment   lymph vessels and lymph nodes in your underarms    How to do lymphatic self-massage   Keep your hands soft and relaxed. Use a light pressure on your skin. The pressure of your hands should be just enough to gently stretch the skin. Only stretch the skin as  far as it can go naturally without causing pain. Release the pressure and let your skin come back as it was. If you can feel your muscles under your skin you are pressing too hard.   Use the flat part (palms) of your hands instead of your fingertips. Your palms allow more contact with the skin to stimulate (pump) the lymph vessels.   Massage towards areas of your body that have not been treated for cancer such as your chest and underarms.   Make sure you are in a comfortable position. You can self-massage while sitting, standing or lying down. Choose a position that is most comfortable for you.   Massage when you are comfortably warm or when you are in a nice, warm room. If your muscles are warm, they are more flexible.   Do self-massage regularly. You can use self-massage as time to relax, breathe, and take care of yourself.    What to avoid   Do not strain your shoulders, neck, arm or hand   Do not self-massage if it causes pain   Do not do self-massage if you have an infection in the area that has swelling    Do not do self-massage if you think you have an infection. Infections can occur in your head, neck or face where your lymph nodes have been removed or you have had radiation.     Signs of an infection may include:    Swelling and redness of the skin. This redness can quickly spread.   Pain or soreness in your head, face or neck where you had treatment.   Warm or hot feeling in your head, face or neck where you had treatment.   Fever or chills.   Feeling unwell.    If you think you have an infection go to:   Your family doctor   Walk-in Clinic   Urgent Care Clinic   Emergency department    If you have had an infection, only start self-massage again when you have finished your antibiotics (medicine). Or if your doctor says it is okay to start again.    Try different ways to make self-massage a part of your routine. Try self-massage while you are watching TV or having a shower so it does not take time away from  your day. Try to make self-massage a time for yourself. Or make it a part of your routine for relaxing    Below are the steps for doing a lymphatic self-massage. Follow the instructions closely. Talk to your health care team if you have any questions.    Deep Breathing    Deep breathing is an important part of your self-care. Deep breathing works like a pump in your body. This pump helps the lymph nodes and vessels move fluid. You can practice deep breathing at any time!   What to do:    Place the palms of both hands on your stomach.   Take a deep breathe in through your nose until your stomach pushes against your hands.   Breathe out slowly through pursed lips (like you are blowing out candles). Then let your stomach go flat.   Repeat 5 times. Take a short rest between each breath so you do not feel dizzy       2. Stretch and release the skin at the front of your neck    This motion helps lymph fluid drain back to your heart. You can massage one side at a time or both sides at the same time. You may find it easier to cross your hands if you are doing both at the same time.   What to do:    Place the flats of your 2nd and 3rd fingers on either side of your neck just above your collarbone.   Massage down and inwards toward your collarbone. Always keep your fingers above your collarbone. Start massage on the area of your neck that is close to your shoulder and gently stretch the skin towards the middle of your neck.   Gently stretch the skin just as far as it goes without pain. Then let go of the skin.   This massage will look like 2 letter Js facing one another.   Repeat 10-15 times on each side.     3. Prepare your underarm lymph nodes    This prepares the lymph vessels and nodes under your arm to take in lymph fluid from your face and neck. Place your arm in a comfortable position. Your arm should be slightly raised and supported. You may want to place your arm on an armrest or  table for comfort.   What to do:     Place your palm of your opposite hand against your underarm.   Gently pull up and in toward your body, then release. Pause for a moment and then start again. Repeat 10-15 times   Do the same pumping on your other underarm. Repeat 10-15 times.       4. Stretch and release the skin from your chest to your underarm    Place you hand on your collarbone. Move your hand down your chest in half circles toward your underarm. Massage your chest to help reduce swelling. This massage will move the lymph fluid from your neck and chest to your underarm lymph vessels and nodes.   What to do:    Place your hand over your collar bone   Gently stretch the skin (not muscles) down your chest and towards your underarm. Then let go of the skin. Pause for a moment. Now repeat this massage stroke as you gradually move your hand down your chest towards your underarm.   Repeat this massage 10-15 times.     5. Stretch and release the skin from the front of your neck to your chest    Massage the front of your neck to help reduce swelling. This massage will move the lymph fluid from your neck to your chest. Place your hand on the front of your neck where you have swelling. Move your hand down your neck towards your collar bone and chest.     What to do:    Place your hand over the swelling at the front of your neck   Gently stretch the skin (not muscles) towards your collarbone. Then let go of the skin. Pause for a moment. Now repeat this massage stroke as you gradually move your hand past your collar bone and down your chest.   Repeat this massage 10-15 times.     6. Stretch and release the skin at the side of your neck    Massage the side of your neck to lower or prevent the swelling in your face and neck. This massage helps stimulate (pump) the vessels at the side of your neck. Do not massage both sides of your neck at the same time if you have radiation treatment.   What to do:    Place your hands flat on the side of your neck   Gently  stretch the skin away from your face and down. Then release.   Massage your neck and side of the face in a slow and gentle way.   Repeat 10-15 times.     7. Stretch and release the skin on the back of your neck    Massage the back of your neck to lower or prevent the swelling in your face and neck. This massage helps stimulate (pump) the vessels at the back of your neck.   What to do:    Place the palms of your hands on the back of your neck, just belowyour hairline.   Stretch the skin towards your spine and then down towards your back.   Repeat 10-15 times.       8. Massage your scar     Massage your scar. This massage is only if you have had surgery. Do not massage until three weeks after surgery. Do not massage until all staples and clips have been removed. Your scar may feel very sensitive, tight or itchy. Scar massage will help reduce these feelings.This massage helps soften the scar and allows better blood flow to the area. Scar massage should always be pain-free. Do not use oil while doing the scar massage. Apply any lotions or oils after the massage.    What to do:    Place the palm of your hand over the scar.    Move up and down in a zigzag pattern or [place] pattern along the scar. See pictures above for help.   Apply firm but gentle pressure while moving along the scar. Try to move the skin.   If possible, gently lift the skin along the scar.   Repeat 5 or 6 times on the scar.   Now place your fingertips just above the scar. Gently stretch the skin away from the scar and release.   Repeat 5 times.   Place your fingertips below the scar. Gently stretch the skin away from the scar. Then release the skin.   Repeat 5 times. If your skin and swollen tissue in your neck or face feels hard, ask your therapist to show you gentle kneading techniques to help soften the firm tissue.    Here is how you will soften the firm tissue on your own at home:   Gently place the pads of your fingers on the tissue or skin that  feels firm or hard.   Gently press down with the pads of your fingers and let go. As you release the pressure, move your fingers down slightly.    Repeat 10-15 times in one area. Move to another area and repeat.     9. Massage for face swelling     Your therapist will draw arrows on the image. This will show you the   way you should do your self-massage.     10. Massage for swelling inside the mouth    Your therapist will draw arrows on the image. This will show you the way you should do your self-massage.   Do not massage inside your mouth if you have any sores   or cuts or pain.   Make sure your hand is clean before you start to massage inside your mouth.     11. Stretch and release the skin at the front of your neck    This motion helps lymph fluid drain back to your heart. You can massage 1 side at a time or both sides at the same time. You may find it easier to cross your hands if you are doing both at the same time.   What to do:    Place the flats of your 2nd and 3rd fingers on either side of your neck just above your collarbone.   Massage down and inwards toward your collarbone. Always keep your fingers above your collarbone. Start massage on the area of your neck that is close to your shoulder and gently stretch the skin towards the middle of your neck.   Gently stretch the skin just as far as it goes without pain. Then let go of the skin.   This massage will look like 2 letter Js facing one another.   Repeat 15 times on each side.

## 2023-03-20 ENCOUNTER — DOCUMENTATION ONLY (OUTPATIENT)
Dept: ADMINISTRATIVE | Facility: OTHER | Age: 68
End: 2023-03-20
Payer: MEDICARE

## 2023-03-23 ENCOUNTER — CLINICAL SUPPORT (OUTPATIENT)
Dept: REHABILITATION | Facility: HOSPITAL | Age: 68
End: 2023-03-23
Payer: MEDICARE

## 2023-03-23 DIAGNOSIS — Z85.89 HISTORY OF HEAD AND NECK CANCER: ICD-10-CM

## 2023-03-23 DIAGNOSIS — M25.9 SHOULDER JOINT DYSFUNCTION: ICD-10-CM

## 2023-03-23 DIAGNOSIS — I89.0 LYMPHEDEMA: Primary | ICD-10-CM

## 2023-03-23 PROCEDURE — 97110 THERAPEUTIC EXERCISES: CPT | Mod: PN,CQ

## 2023-03-23 PROCEDURE — 97140 MANUAL THERAPY 1/> REGIONS: CPT | Mod: PN,CQ

## 2023-03-23 NOTE — PROGRESS NOTES
"[facility]  Physical Therapy Progress Note  Lymphedema Therapy    Visit Date: 3/23/2023    Name: Seven Freire  MRN: [id number]  Therapy Diagnosis:   Encounter Diagnoses   Name Primary?    Lymphedema Yes    History of head and neck cancer     Shoulder joint dysfunction        Physician: Juanita Sellers MD    Evaluation Date: 2/23/23  Reassess: 03/15/2023    Visit #/Visits authorized: pending   Authorization Dates: pending  Plan of Care Expiration: 10 visits/ 30days  MD order: PT Eval and Treat       Visit: 6 / 18  PTA Visit: 1 / 5  Time In: 11:05 AM  Time Out: 12:05 PM  Total Billable Time: 60 minutes    Precautions: Standard, cancer , lymphedema    Subjective     Pt reports: that he saw Dr Boudreaux and "he put a scope down my throat, and he said that everything looked good. I have gained 5#." Sleeping with compression for last week and can really tell a difference; less swelling. Using pump every day. Pt reports that for a day after his treatment for lymphedema, his swelling is much better."     Pain: 6-7/10  Location: cervical spine    Objective       TREATMENT  Manual Therapy to develop flexibility, extensibility, pliability, and contour for 45 minutes including:   Manual lymphatic drainage to bilateral short neck series, cervical terminus , axilla, sternal nodes, paravertebral nodes, submental nodes, pre- and retro auricular nodes with facial sequence,  AIA anastomosis , RUTH anastomosis , and rework accessing all watershed areas on trunk.  Lymphatouch applied to cervical terminus, lateral cervical triangle, submental region bilaterally, anterior neck set at 55mmHg for 15 minutes.  Reviewed with pt self MLD hand placements to cervical terminus, B axilla, lateral neck B  and from neck to chest (handout also provided again for review)  Deferred: Rock tape applied to anchored at superior border of scapula extending to lateral and anterior neck/submental region, second strip anchored at " "right cervical terminus extending in weave pattern to anterior neck  to improve lymphangiomotorcity 10 min Pt instructed to remove within 72 hours or sooner if it begins to irritate skin.     Therapeutic Exercise to develop strength, ROM, flexibility, and posture for 15 minutes including:  UBE L1 x 6' (alt direction ea minute)  Scap retractions with RTB 3 x 10  Straight arm pulldowns with RTB 3 x 10  Bilateral ER with RTB 2 x 10  Deep diaphragm breathing  Supine Chin tucks  In supine Cervical rotations R/L  Seated shoulder rolls x 10    Last session:  Self Care/Education/ADL training 10 minutes: Educated pt and wife of using all parts of Flexitouch (vest and head piece for max benefit) during entire treatment at home (was only using just head piece for a while.)  PT spoke with pts wife at length regarding increasing use of his pump from just in the evenings to mornings (when feeling most full), importance of performing self MLD daily  to increase lymphatic flow (encouraged pt to perform when in the shower, or whenever easiest to remember.) PT also reiterated to pt/wife benefit of wearing compression garment at night time to assist in decongesting and prevent return of fullness. Pt verbalized understanding of needing to improve his compliance for better results.   Pt states "I feel like always after PT treatment it feels softer and will continue to feel less swollen as the day goes on."      EDUCATION  Education provided:   - Progress towards goals   - Role of therapy   - Activity modification  - Lymphedema information (definitions, signs, symptoms, precautions), purpose of lymphedema physical therapy and the benefits/risks of treatment    Written Home Exercises Provided:  Provided pt with additional handout of self MLD techniques, reviewed with pt steps 1-3, will continue with remaining steps during following visits. .  Exercises were reviewed and Seven was able to demonstrate them prior to the end of the session.  " Seven demonstrated good  understanding of the education provided.   See EMR under Patient Instructions for exercises provided 3/2/2023.    Assessment   Pt reports improved compliance with wearing night time compression and using pump with associated decrease in swelling.   Pt tolerated today's tx well with no visible or reported adverse affects. Positive tissue response to MLD, lympha touch and exercise with softening and increased wrinkling noted. Reviewed with patient self MLD techniques with proper handplacement, education on lymphemedema and benefit of performing self MLD at home, utilizing compression pump 1-2x day with importance of maintaining consistancy with home program, wearing night time compression every night for best results.     Seven Is progressing well towards his goals. Pt will continue to benefit from skilled outpatient physical therapy to address the deficits listed in the problem list box on initial evaluation, provide pt/family education and to maximize pt's level of independence in the home and community environment.   Pt prognosis is Good.     Patient reports having difficulty with eating and has lost some weight. Dr. Sellers wanted me to return to PT. Pt has decreased mandibular depression limiting opening of mouth, scapular stabilization weakness more noted on R as compared to L and fullness with decreased tissue pliability in submental/ anterior neck region.Poor level of endurance which could be improved.   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 Patient completed  chemotherapy 1x a week and 30 tx of radiation at [facility] in [place]. Pt primary complaint  Patient presents with lymphedema of the head/neck with fullness to submandibular region and anterior/lateral neck, increased pain, increased stiffness in the right  shoulder and cervical range of motion, as well as difficulty performing overhead reaching with flexion and abduction ranges and placing the pt at rist of higher infection.    Plan of care discussed with patient: Yes  Pt's spiritual, cultural and educational needs considered and patient is agreeable to the plan of care and goals.    Anticipated Barriers for therapy: none    GOALS  Short Term Goals: (3 weeks) 03/15/2023  1. Decrease cervical girth by 1 cm (Not met, girth measures increased in 2 of 3 landmarks)  2. Patient will demonstrate 100% knowledge of lymphedema precautions and signs of infection.   3. Patient demonstrates independence with Postural Awareness.   4. Patient will perform self lymph drainage techniques. (Non-compliant)  5. Patient demonstrates independence with HEP.         Long Term Goals: (6 weeks) (IN progress)  1. Decrease cervical girth by 2 cm.  2. Patient will perform self lymph drainage techniques independently.  3. Patient to tomas/doff compression garment.   4. Patient demonstrates increased B scapular stabilization to at least 4+/5   5. Patient demonstrates increased mandibular depression to 50 mm   6. Patient demonstrates increased strength BUE's to 4+/5 or greater to improve tolerance to functional activities pain free.   7. Patient to report improvement in overall endurance, participating in 30 minute walking program 3-5x week.     Plan     Continue with established POC working toward PT goals.    Rochelle Leslie PTA , CLT

## 2023-03-23 NOTE — PROGRESS NOTES
2023      Hudson Seals MD   [address]   [place] [address]      RE: Seven Freire    MR#:  [id number]    :  1955       DX:  1. 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  Right modified radical neck dissection in 2022.  Unknown head and neck primary.  Group pathologic stage IVB, cT0 pN3b (5/39 right cervical lymph nodes were positive, including major extracapsular extension to the sternocleidomastoid muscle, internal jugular vein, adventitia of the common carotid artery and the right pharyngeal constrictors muscles; largest lymph node was 3.1 cm) cM0.  PD-L1 positive.   2.      My 2022 evaluation suggests severe depression.          Dear Hudson:     Your patient returns 10 months after completing postoperative right neck irradiation and irradiation was also delivered to the left neck, as well as all possible sites of primary including the nasopharynx, oropharynx and hypopharynx.     On , the patient saw Dr. Haddad.  On , TSH was elevated at 5.3.     On , the patient saw you in follow up.  You saw no evidence of recurrence.  The patient was sent back to Lymphedema for his neck.  The patient was encouraged to decrease alcohol use.     The patient has done very well since I saw him last.  He is using his compression garment for his neck for lymphedema and he thinks this is helpful.  The patient was once again reminded to continue to do anti-trismus exercises, and reminded to take very good care of his teeth using prescription-strength fluoride and seeing his dentist regularly (the patient said he had stopped doing his fluoride care and stopped taking good care of his teeth), and he was told in the strongest terms that he needs to take very good care of his teeth or there could be significant consequences.  The patient's mental status is dramatically improved, and he has been seen  by the psychiatrist and apparently this has been very helpful.     Physical exam shows a man in no distress.  His lymphedema of his neck seems improved.  There is no cervical lymphadenopathy.  Examination of the oral cavity and oropharynx is unremarkable.     Fiberoptic scope examination shows right greater than left arytenoid edema, but no lesions suspicious for tumor recurrence and this arytenoid edema is fairly characteristic for postradiation changes.     Assessment and Plan:  Symptomatically, the patient is doing well and I see no evidence for tumor recurrence on history or physical exam findings.  The patient was once again instructed on the importance of taking good care of his teeth, and using the fluoride for dental protection and seeing his dentist regularly.  The patient's TSH in February was elevated at 5.3.  The patient has a copy of that TSH and we have also faxed the TSA report to Dr. Sellers, and the patient will talk with Dr. Sellers about thyroid replacement when he sees Dr. Sellers on April 4.  On April 24, a PET-CT scan will be performed under the direction of Dr. Haddad.  On April 27, the patient will see Dr. Haddad.  I have asked to see the patient in August.     35 minutes was spent on this follow up, of which 10 minutes was spent in record review, 20 minutes was spent directly with the patient, and over five minutes was spent in documentation.     I appreciate the opportunity to consult on your patients.  Please call me with any questions or comments.     Sincerely,          Electronically Signed By:  MARISA Uriarte/Gabe  DD:  03/21/2023  DT:  03/21/2023  Job #:  [id number]    CC:  Ewa Jaramillo M.D., [address] [place] [address], Fax: [phone]        Kalpesh Haddad M.D., [address] [place] [address], Fax: [phone]        Juanita Sellers M.D., [address], [place] [address], Fax: [phone]

## 2023-03-27 ENCOUNTER — CLINICAL SUPPORT (OUTPATIENT)
Dept: REHABILITATION | Facility: HOSPITAL | Age: 68
End: 2023-03-27
Payer: MEDICARE

## 2023-03-27 DIAGNOSIS — M25.9 SHOULDER JOINT DYSFUNCTION: ICD-10-CM

## 2023-03-27 DIAGNOSIS — I89.0 LYMPHEDEMA: Primary | ICD-10-CM

## 2023-03-27 PROCEDURE — 97140 MANUAL THERAPY 1/> REGIONS: CPT | Mod: PN

## 2023-03-27 PROCEDURE — 97110 THERAPEUTIC EXERCISES: CPT | Mod: PN

## 2023-03-27 NOTE — PROGRESS NOTES
"[facility]  Physical Therapy Progress Note  Lymphedema Therapy    Visit Date: 3/27/2023    Name: Seven Freire  MRN: [id number]  Therapy Diagnosis:   Encounter Diagnoses   Name Primary?    Lymphedema Yes    Shoulder joint dysfunction        Physician: Juanita Sellers MD    Evaluation Date: 2/23/23  Reassess: 03/15/2023    Visit #/Visits authorized: pending   Authorization Dates: pending  Plan of Care Expiration: 10 visits/ 30days  MD order: PT Eval and Treat       Visit: 7 / 18  PTA Visit: 0/ 5  Time In: 2:05 PM  Time Out: 3:00 PM  Total Billable Time: 55 minutes    Precautions: Standard, cancer , lymphedema    Subjective     Pt reports: doing ok today. Missed the pump last night, but has been using it. Sleeping with neck collar every night.and able to stay asleep with it on.  "It helps" I can't say I have been doing a lot of exercising at home. That is my fault.    Pain: 0/10  Location: cervical spine    Objective       TREATMENT  Manual Therapy to develop flexibility, extensibility, pliability, and contour for 35 minutes including:   Manual lymphatic drainage to bilateral short neck series, cervical terminus , axilla, sternal nodes, paravertebral nodes, submental nodes, pre- and retro auricular nodes with facial sequence,  AIA anastomosis , RUTH anastomosis , and rework accessing all watershed areas on trunk.  Lymphatouch applied to cervical terminus, lateral cervical triangle, submental region bilaterally, anterior neck set at 55mmHg for 15 minutes.  Reviewed with pt self MLD hand placements to cervical terminus, B axilla, lateral neck B  and from neck to chest (handout also provided again for review)    Deferred: Rock tape applied to anchored at superior border of scapula extending to lateral and anterior neck/submental region, second strip anchored at right cervical terminus extending in weave pattern to anterior neck  to improve lymphangiomotorcity 10 min Pt instructed to remove " "within 72 hours or sooner if it begins to irritate skin.     Therapeutic Exercise to develop strength, ROM, flexibility, and posture for 25 minutes including:  UBE L1 x 6' (alt direction ea minute)  Nu-Step 11 min L3  HR 93 bpm, O2 93 %  Wall Ball Roll up (red physioball) 10 sec hold x10 reps   Deep diaphragm breathing  Supine Chin tucks  Grimacing ex in supine and sitting 10 reps each  In supine Cervical rotations R/L  Seated shoulder rolls x 10    deferred  Scap retractions with RTB 3 x 10  Straight arm pulldowns with RTB 3 x 10  Bilateral ER with RTB 2 x 10      Previous  session:  Self Care/Education/ADL training 10 minutes: Educated pt and wife of using all parts of Flexitouch (vest and head piece for max benefit) during entire treatment at home (was only using just head piece for a while.)  PT spoke with pts wife at length regarding increasing use of his pump from just in the evenings to mornings (when feeling most full), importance of performing self MLD daily  to increase lymphatic flow (encouraged pt to perform when in the shower, or whenever easiest to remember.) PT also reiterated to pt/wife benefit of wearing compression garment at night time to assist in decongesting and prevent return of fullness. Pt verbalized understanding of needing to improve his compliance for better results.   Pt states "I feel like always after PT treatment it feels softer and will continue to feel less swollen as the day goes on."      EDUCATION  Education provided:   - Progress towards goals   - Role of therapy   - Activity modification  - Lymphedema information (definitions, signs, symptoms, precautions), purpose of lymphedema physical therapy and the benefits/risks of treatment    Written Home Exercises Provided:  Provided pt with additional handout of self MLD techniques, reviewed with pt steps 1-3, will continue with remaining steps during following visits. .  Exercises were reviewed and Seven was able to demonstrate them " prior to the end of the session.  Seven demonstrated good  understanding of the education provided.   See EMR under Patient Instructions for exercises provided 3/2/2023.    Assessment   Pt reports improved compliance with wearing night time compression and using pump with associated decrease in swelling.   Pt tolerated today's tx well with no visible or reported adverse affects. Positive tissue response to MLD, lympha touch and exercise with softening and increased wrinkling noted. Reviewed with patient self MLD techniques with proper handplacement, education on lymphemedema and benefit of performing self MLD at home, utilizing compression pump 1-2x day with importance of maintaining consistancy with home program, wearing night time compression every night for best results.     Seven Is progressing well towards his goals. Pt will continue to benefit from skilled outpatient physical therapy to address the deficits listed in the problem list box on initial evaluation, provide pt/family education and to maximize pt's level of independence in the home and community environment.   Pt prognosis is Good.     Patient reports having difficulty with eating and has lost some weight. Dr. Sellers wanted me to return to PT. Pt has decreased mandibular depression limiting opening of mouth, scapular stabilization weakness more noted on R as compared to L and fullness with decreased tissue pliability in submental/ anterior neck region.Poor level of endurance which could be improved.   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 Patient completed  chemotherapy 1x a week and 30 tx of radiation at [facility] in [place]. Pt primary complaint  Patient presents with lymphedema of the head/neck with fullness to submandibular region and anterior/lateral neck, increased pain,  increased stiffness in the right shoulder and cervical range of motion, as well as difficulty performing overhead reaching with flexion and abduction ranges and placing the pt at rist of higher infection.    Plan of care discussed with patient: Yes  Pt's spiritual, cultural and educational needs considered and patient is agreeable to the plan of care and goals.    Anticipated Barriers for therapy: none    GOALS  Short Term Goals: (3 weeks) 03/15/2023  1. Decrease cervical girth by 1 cm (Not met, girth measures increased in 2 of 3 landmarks)  2. Patient will demonstrate 100% knowledge of lymphedema precautions and signs of infection.   3. Patient demonstrates independence with Postural Awareness.   4. Patient will perform self lymph drainage techniques. (Non-compliant)  5. Patient demonstrates independence with HEP.         Long Term Goals: (6 weeks) (IN progress)  1. Decrease cervical girth by 2 cm.  2. Patient will perform self lymph drainage techniques independently.  3. Patient to tomas/doff compression garment.   4. Patient demonstrates increased B scapular stabilization to at least 4+/5   5. Patient demonstrates increased mandibular depression to 50 mm   6. Patient demonstrates increased strength BUE's to 4+/5 or greater to improve tolerance to functional activities pain free.   7. Patient to report improvement in overall endurance, participating in 30 minute walking program 3-5x week.     Plan     Continue with established POC working toward PT goals.    Laurence Bahena, PT , CLT

## 2023-03-31 ENCOUNTER — CLINICAL SUPPORT (OUTPATIENT)
Dept: REHABILITATION | Facility: HOSPITAL | Age: 68
End: 2023-03-31
Payer: MEDICARE

## 2023-03-31 DIAGNOSIS — I89.0 LYMPHEDEMA: Primary | ICD-10-CM

## 2023-03-31 PROCEDURE — 97110 THERAPEUTIC EXERCISES: CPT | Mod: PN

## 2023-03-31 PROCEDURE — 97140 MANUAL THERAPY 1/> REGIONS: CPT | Mod: PN

## 2023-03-31 NOTE — PROGRESS NOTES
[facility]  Physical Therapy Progress Note  Lymphedema Therapy    Visit Date: 3/31/2023    Name: Seven Freire  MRN: [id number]  Therapy Diagnosis:   Encounter Diagnosis   Name Primary?    Lymphedema Yes       Physician: Juanita Sellers MD    Evaluation Date: 2/23/23  Reassess: 03/15/2023    Visit #/Visits authorized: pending   Authorization Dates: pending  Plan of Care Expiration: 10 visits/ 30days  MD order: PT Eval and Treat       Visit: 8 / 18  PTA Visit: 0/ 5  Time In:1:03  PM  Time Out: 2:00 PM  Total Billable Time: 55 minutes    Precautions: Standard, cancer , lymphedema    Subjective     Pt reports: doing ok today. Still wearing collar at night. Missed yesterday with pump,but most days I do the pump and sometimes I do that twice a day. Not much on exercise.    Pain: 0/10  Location: cervical spine    Objective       TREATMENT  O2 sat 95% and HR 97 bpm at rest   Manual Therapy to develop flexibility, extensibility, pliability, and contour for 35 minutes including:   Manual lymphatic drainage to bilateral short neck series, cervical terminus , axilla, sternal nodes, paravertebral nodes, submental nodes, pre- and retro auricular nodes with facial sequence,  AIA anastomosis , RUTH anastomosis , and rework accessing all watershed areas on trunk.  Lymphatouch applied to cervical terminus, lateral cervical triangle, submental region bilaterally, anterior neck set at 55mmHg for 15 minutes.  Rock tape applied to anchored at superior border of scapula extending to lateral and anterior neck/submental region, second strip anchored at right cervical terminus extending in weave pattern to anterior neck  to improve lymphangiomotorcity 10 min Pt instructed to remove within 72 hours or sooner if it begins to irritate skin.   Previously Reviewed with pt self MLD hand placements to cervical terminus, B axilla, lateral neck B  and from neck to chest (handout also provided again for  "review)    Therapeutic Exercise to develop strength, ROM, flexibility, and posture for 25 minutes including:  UBE L1 x 8' (alt direction ea minute) 98 bpm, O2 97%  Stat bike 12 min L2   bpm, O2 96 %  Wall Ball Roll up (red physioball) 10 sec hold x10 reps   Deep diaphragm breathing  Supine Chin tucks  Grimacing ex in supine and sitting 10 reps each  In supine Cervical rotations R/L  Seated shoulder rolls x 10    deferred  Scap retractions with RTB 3 x 10  Straight arm pulldowns with RTB 3 x 10  Bilateral ER with RTB 2 x 10      Previous  session:  Self Care/Education/ADL training 10 minutes: Educated pt and wife of using all parts of Flexitouch (vest and head piece for max benefit) during entire treatment at home (was only using just head piece for a while.)  PT spoke with pts wife at length regarding increasing use of his pump from just in the evenings to mornings (when feeling most full), importance of performing self MLD daily  to increase lymphatic flow (encouraged pt to perform when in the shower, or whenever easiest to remember.) PT also reiterated to pt/wife benefit of wearing compression garment at night time to assist in decongesting and prevent return of fullness. Pt verbalized understanding of needing to improve his compliance for better results.   Pt states "I feel like always after PT treatment it feels softer and will continue to feel less swollen as the day goes on."      EDUCATION  Education provided:   - Progress towards goals   - Role of therapy   - Activity modification  - Lymphedema information (definitions, signs, symptoms, precautions), purpose of lymphedema physical therapy and the benefits/risks of treatment    Written Home Exercises Provided:  Provided pt with additional handout of self MLD techniques, reviewed with pt steps 1-3, will continue with remaining steps during following visits. .  Exercises were reviewed and Seven was able to demonstrate them prior to the end of the session. "  Seven demonstrated good  understanding of the education provided.   See EMR under Patient Instructions for exercises provided 3/2/2023.    Assessment   Pt reports improved compliance with wearing night time compression and using pump with associated decrease in swelling.   Pt tolerated today's tx well with no visible or reported adverse affects. Positive tissue response to MLD, lympha touch and exercise with softening and increased wrinkling noted. Reviewed with patient self MLD techniques with proper handplacement, education on lymphemedema and benefit of performing self MLD at home, utilizing compression pump 1-2x day with importance of maintaining consistancy with home program, wearing night time compression every night for best results.     Seven Is progressing well towards his goals. Pt will continue to benefit from skilled outpatient physical therapy to address the deficits listed in the problem list box on initial evaluation, provide pt/family education and to maximize pt's level of independence in the home and community environment.   Pt prognosis is Good.     Patient reports having difficulty with eating and has lost some weight. Dr. Sellers wanted me to return to PT. Pt has decreased mandibular depression limiting opening of mouth, scapular stabilization weakness more noted on R as compared to L and fullness with decreased tissue pliability in submental/ anterior neck region.Poor level of endurance which could be improved.   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 Patient completed  chemotherapy 1x a week and 30 tx of radiation at [facility] in [place]. Pt primary complaint  Patient presents with lymphedema of the head/neck with fullness to submandibular region and anterior/lateral neck, increased pain, increased stiffness in the right  shoulder and cervical range of motion, as well as difficulty performing overhead reaching with flexion and abduction ranges and placing the pt at rist of higher infection.    Plan of care discussed with patient: Yes  Pt's spiritual, cultural and educational needs considered and patient is agreeable to the plan of care and goals.    Anticipated Barriers for therapy: none    GOALS  Short Term Goals: (3 weeks) 03/15/2023  1. Decrease cervical girth by 1 cm (Not met, girth measures increased in 2 of 3 landmarks)  2. Patient will demonstrate 100% knowledge of lymphedema precautions and signs of infection.   3. Patient demonstrates independence with Postural Awareness.   4. Patient will perform self lymph drainage techniques. (Non-compliant)  5. Patient demonstrates independence with HEP.         Long Term Goals: (6 weeks) (IN progress)  1. Decrease cervical girth by 2 cm.  2. Patient will perform self lymph drainage techniques independently.  3. Patient to tomas/doff compression garment.   4. Patient demonstrates increased B scapular stabilization to at least 4+/5   5. Patient demonstrates increased mandibular depression to 50 mm   6. Patient demonstrates increased strength BUE's to 4+/5 or greater to improve tolerance to functional activities pain free.   7. Patient to report improvement in overall endurance, participating in 30 minute walking program 3-5x week.     Plan     Continue with established POC working toward PT goals.    Laurence Bahena, PT , CLT

## 2023-04-04 ENCOUNTER — OFFICE VISIT (OUTPATIENT)
Dept: HEMATOLOGY/ONCOLOGY | Facility: CLINIC | Age: 68
End: 2023-04-04
Payer: MEDICARE

## 2023-04-04 ENCOUNTER — CLINICAL SUPPORT (OUTPATIENT)
Dept: REHABILITATION | Facility: HOSPITAL | Age: 68
End: 2023-04-04
Payer: MEDICARE

## 2023-04-04 VITALS
SYSTOLIC BLOOD PRESSURE: 131 MMHG | TEMPERATURE: 97 F | HEIGHT: 72 IN | BODY MASS INDEX: 21.56 KG/M2 | DIASTOLIC BLOOD PRESSURE: 85 MMHG | RESPIRATION RATE: 18 BRPM | HEART RATE: 86 BPM | WEIGHT: 159.19 LBS | OXYGEN SATURATION: 94 %

## 2023-04-04 DIAGNOSIS — I89.0 LYMPHEDEMA: ICD-10-CM

## 2023-04-04 DIAGNOSIS — Z85.89 HISTORY OF HEAD AND NECK CANCER: ICD-10-CM

## 2023-04-04 DIAGNOSIS — E03.9 ACQUIRED HYPOTHYROIDISM: Primary | ICD-10-CM

## 2023-04-04 DIAGNOSIS — C80.1 SECONDARY SQUAMOUS CELL CARCINOMA OF HEAD AND NECK WITH UNKNOWN PRIMARY SITE: ICD-10-CM

## 2023-04-04 DIAGNOSIS — C79.89 SECONDARY SQUAMOUS CELL CARCINOMA OF HEAD AND NECK WITH UNKNOWN PRIMARY SITE: ICD-10-CM

## 2023-04-04 DIAGNOSIS — F33.1 MODERATE EPISODE OF RECURRENT MAJOR DEPRESSIVE DISORDER: ICD-10-CM

## 2023-04-04 DIAGNOSIS — R13.13 PHARYNGEAL DYSPHAGIA: Primary | ICD-10-CM

## 2023-04-04 DIAGNOSIS — M25.511 PAIN IN JOINT OF RIGHT SHOULDER: ICD-10-CM

## 2023-04-04 DIAGNOSIS — R13.19 OTHER DYSPHAGIA: ICD-10-CM

## 2023-04-04 PROCEDURE — 1159F MED LIST DOCD IN RCRD: CPT | Mod: CPTII,S$GLB,, | Performed by: OTOLARYNGOLOGY

## 2023-04-04 PROCEDURE — 92526 ORAL FUNCTION THERAPY: CPT | Mod: PN

## 2023-04-04 PROCEDURE — 1101F PT FALLS ASSESS-DOCD LE1/YR: CPT | Mod: CPTII,S$GLB,, | Performed by: OTOLARYNGOLOGY

## 2023-04-04 PROCEDURE — 3288F FALL RISK ASSESSMENT DOCD: CPT | Mod: CPTII,S$GLB,, | Performed by: OTOLARYNGOLOGY

## 2023-04-04 PROCEDURE — 1160F PR REVIEW ALL MEDS BY PRESCRIBER/CLIN PHARMACIST DOCUMENTED: ICD-10-PCS | Mod: CPTII,S$GLB,, | Performed by: OTOLARYNGOLOGY

## 2023-04-04 PROCEDURE — 1160F RVW MEDS BY RX/DR IN RCRD: CPT | Mod: CPTII,S$GLB,, | Performed by: OTOLARYNGOLOGY

## 2023-04-04 PROCEDURE — 1159F PR MEDICATION LIST DOCUMENTED IN MEDICAL RECORD: ICD-10-PCS | Mod: CPTII,S$GLB,, | Performed by: OTOLARYNGOLOGY

## 2023-04-04 PROCEDURE — 3008F BODY MASS INDEX DOCD: CPT | Mod: CPTII,S$GLB,, | Performed by: OTOLARYNGOLOGY

## 2023-04-04 PROCEDURE — 99214 PR OFFICE/OUTPT VISIT, EST, LEVL IV, 30-39 MIN: ICD-10-PCS | Mod: 25,S$GLB,, | Performed by: OTOLARYNGOLOGY

## 2023-04-04 PROCEDURE — 3079F DIAST BP 80-89 MM HG: CPT | Mod: CPTII,S$GLB,, | Performed by: OTOLARYNGOLOGY

## 2023-04-04 PROCEDURE — 4010F ACE/ARB THERAPY RXD/TAKEN: CPT | Mod: CPTII,S$GLB,, | Performed by: OTOLARYNGOLOGY

## 2023-04-04 PROCEDURE — 3075F SYST BP GE 130 - 139MM HG: CPT | Mod: CPTII,S$GLB,, | Performed by: OTOLARYNGOLOGY

## 2023-04-04 PROCEDURE — 3288F PR FALLS RISK ASSESSMENT DOCUMENTED: ICD-10-PCS | Mod: CPTII,S$GLB,, | Performed by: OTOLARYNGOLOGY

## 2023-04-04 PROCEDURE — 99999 PR PBB SHADOW E&M-EST. PATIENT-LVL V: CPT | Mod: PBBFAC,,, | Performed by: OTOLARYNGOLOGY

## 2023-04-04 PROCEDURE — 1101F PR PT FALLS ASSESS DOC 0-1 FALLS W/OUT INJ PAST YR: ICD-10-PCS | Mod: CPTII,S$GLB,, | Performed by: OTOLARYNGOLOGY

## 2023-04-04 PROCEDURE — 1125F PR PAIN SEVERITY QUANTIFIED, PAIN PRESENT: ICD-10-PCS | Mod: CPTII,S$GLB,, | Performed by: OTOLARYNGOLOGY

## 2023-04-04 PROCEDURE — 99999 PR PBB SHADOW E&M-EST. PATIENT-LVL V: ICD-10-PCS | Mod: PBBFAC,,, | Performed by: OTOLARYNGOLOGY

## 2023-04-04 PROCEDURE — 1125F AMNT PAIN NOTED PAIN PRSNT: CPT | Mod: CPTII,S$GLB,, | Performed by: OTOLARYNGOLOGY

## 2023-04-04 PROCEDURE — 31575 DIAGNOSTIC LARYNGOSCOPY: CPT | Mod: S$GLB,,, | Performed by: OTOLARYNGOLOGY

## 2023-04-04 PROCEDURE — 4010F PR ACE/ARB THEARPY RXD/TAKEN: ICD-10-PCS | Mod: CPTII,S$GLB,, | Performed by: OTOLARYNGOLOGY

## 2023-04-04 PROCEDURE — 3079F PR MOST RECENT DIASTOLIC BLOOD PRESSURE 80-89 MM HG: ICD-10-PCS | Mod: CPTII,S$GLB,, | Performed by: OTOLARYNGOLOGY

## 2023-04-04 PROCEDURE — 31575 PR LARYNGOSCOPY, FLEXIBLE; DIAGNOSTIC: ICD-10-PCS | Mod: S$GLB,,, | Performed by: OTOLARYNGOLOGY

## 2023-04-04 PROCEDURE — 3008F PR BODY MASS INDEX (BMI) DOCUMENTED: ICD-10-PCS | Mod: CPTII,S$GLB,, | Performed by: OTOLARYNGOLOGY

## 2023-04-04 PROCEDURE — 3075F PR MOST RECENT SYSTOLIC BLOOD PRESS GE 130-139MM HG: ICD-10-PCS | Mod: CPTII,S$GLB,, | Performed by: OTOLARYNGOLOGY

## 2023-04-04 PROCEDURE — 99214 OFFICE O/P EST MOD 30 MIN: CPT | Mod: 25,S$GLB,, | Performed by: OTOLARYNGOLOGY

## 2023-04-04 RX ORDER — LEVOTHYROXINE SODIUM 75 UG/1
75 TABLET ORAL DAILY
Qty: 30 TABLET | Refills: 6 | Status: SHIPPED | OUTPATIENT
Start: 2023-04-04 | End: 2024-04-03

## 2023-04-04 NOTE — PROGRESS NOTES
Date of Encounter: 2/15/2022  Provider: Juanita Sellers MD  Referring MD: Hudson Seals MD  PCP: Ewa Jaramillo MD  Rad Onc: Christian Wallace MD  Med Onc:    CC:  Unknown primary of the head neck by Aguila Seals and Christian Wallace, [id number] SCCA    HPI:    Patient is a 66-year-old male who presented for evaluation and treatment of neck disease of an unknown primary of the head neck by Donya Seals and Christian Wallace.  HPV and EBV status unknown at this time due to lack of pathologic material.  NCCN guidelines recommend up front neck dissection followed with radiation therapy +/- chemo pending final path as per Dr Wallace's note.  On presentation today he denies neck pain, otalgia, dysphagia, odynophagia.    Patient reports that he bled heavily following tonsillectomy.  He presented to a local urgent care.  Patient was told that he had an infection it was prescribed Augmentin.  He has been very weak following that episode and due to low blood pressure he has stopped his antihypertensives, Plavix and aspirin.  He did not consult with his physician 1st.    Patient also has a history of tobacco abuse and continues to smoke a pack of cigarettes a day.  He is not interested in tobacco cessation at this time.  He also has a history of depression for which he was taken Wellbutrin.  He also discontinued this as he thought it was not helping him    3/15/2022  Patient is here today for post op visit. Staples were removed last week.  He has no complaints.  He was seen by Dr. Wallace last Friday who noted an also on his right soft palate.  Patient reports that this is painful only when it is touched.  Patient also noted neck swelling starting about 2 days ago.    Patient continues to using nicotine patch and has not smoke since surgery.    3/17/2022  Here today because seroma has reaccumulated  No symptoms    03/22/2022  Patient is here today for follow-up for re-evaluation of his neck.  He presented with postop  seroma.  He also presented with the also involving his right soft palate postop which could be due to trauma versus a small carcinoma.  Patient reports that the seroma has reacumulated with a small amount.  He also had PT scheduled today but it was cancelled due to weather.    5/26/2022  Patient is here today for follow-up.  He is status post an extended right modified radical neck dissection were unknown primary.  Postoperatively his course was complicated with a seroma which was aspirated in clinic.  He is status post completion of chemoradiation 5/13/2022  Patient overall tolerated treatment well except for extreme fatigue.  He canceled his physical and occupational therapy appointments due to feeling exhausted. He was treated with Ritalin by Dr Haddad which gave him nightmares.  He stopped this medication.  He is tolerating a regular diet.  He has lost 15 lb since started treatment but his weight is now stabilized.    12/8/2022  Patient is here routine cancer surveillance.  He was recently seen by Dr. Wallace who reported that his recent PET CT scan was negative (not available for my review today).  Patient has lost 13 pounds since May 2022. He only eats one meal a day--he has always done so. In addition to one meal he eats a banana and one Ensure. Patient reports that he is gaining weight. A more recent weight according to patient was 162. He occasionally gets choked on meats.  He is also using Lymphedema machine at home but it has not assisted with lymphedema and machine leaves deep marks on his face. Patient thinks that it is not fitting correctly.      2/13/2023    Patient is here today for worsening dysphagia. He can tolerate soft foods and soups and some meats: Breakfast: 2 cups coffee plus boost and banana; no lunch; full meal for supper. Some days he does not eat supper (2-3 nights/week). This occurs with anorexia. He has lost 7 pounds since last visit. He reports that drinks 5-12 beers/day and this  "sometimes kills his appetite.  He stopped smoking following surgery.  Patient also feels like there is a "pocket" in his throat.  He has a hx of esophageal stenosis S/P dilation years ago.    He has not seen PT, OT or ST in several months    Patient is to see Dr Wallace in March in addition to a PET CT scan. He just saw [facility] last week and according to patient his lab work was normal. However I cannot view results and it is unclear as to whether a TSH was drawn.    4/4/2023  Patient is here today for routine cancer surveillance.  He has a question regarding his thyroid test results.  He is tolerating a regular diet and has maintained his weight. He is S/P dsyphagia therapy.  He continues to drink 8-10 cans beer/day.    Patient continues with submental edema.  He is using his lymphedema vest daily    ROS: see HPI  Constitutional: Negative for activity change and appetite change, weight loss.   Eyes: Negative for discharge, visual changes.   Respiratory: Negative for difficulty breathing and wheezing   Cardiovascular: Negative for chest pain.   Gastrointestinal: Negative for abdominal distention and abdominal pain.   Endocrine: Negative for cold intolerance and heat intolerance.   Genitourinary: Negative for dysuria.   Musculoskeletal: Negative for gait problem, muscle pain and joint swelling.   Skin: Negative for color change and pallor; negative for skin lesions.   Neurological: Negative for syncope and weakness; no numbness face.   Psychiatric/Behavioral: Negative for agitation and confusion; negative for depression.    Physical Exam:      Constitutional  General Appearance: well nourished, well-developed, alert, oriented, in no acute distress' very thin but not cachectic  Communication: ability, understanding, normal  Head and Face  Inspection: normocephalic, atraumatic, no scars, lesions or masses   Palpation: no stepoffs, sinus tenderness or masses  Parotid glands: no masses, stones, swelling or " tenderness  Eyes  Ocular Motility / Alignment: normal alignment, motility, no proptosis, enophthalmus or nystagmus  Conjunctiva: not injected  Eyelids: no hooding, lag, entropion, or ectropion  Nose  External Nose: no lesions, tenderness, trauma or deformity  Intranasal Exam: no edema, erythema, discharge, mass or obstruction  Oral Cavity / Oropharynx  Lips: upper and lower lips pink and moist  Teeth: good dentition  Gingiva: healthy  Oral Mucosa: tdry; no mucosal lesions  Floor of Mouth: normal, no lesions, salivary ducts patent  Tongue: moist, normal mobility, no lesions  Palate: soft and hard palates without lesions or ulcers  Oropharynx: tonsils absent; no lesions  Nasopharynx, Hypopharynx, and Larynx  Indirect:          Thick mucous; post larynx normal  Neck  Inspection and Palpation: right sided hockey incision healed; pedicle to PMMF palpated; submental edema-marked  Larynx and Trachea: normal position; normal crepitus  Thyroid: no tenderness, enlargement or nodules  Submandibular Glands: no masses or tenderness  Lymphatic:  Anterior, Posterior, Submandibular, Submental, Supraclavicular: no masses    Neurological  Cranial Nerves: 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 The nasal cavity, nasopharynx, oropharynx, hypopharynx and larynx were adequately visualized. The true vocal cords and arytenoids were examined  "during phonation and repose.        Operative Findings:     Nasal Cavity: Within normal limits  Nasopharynx: Within normal limits  Tongue Base: Within normal limits  Pharyngeal Walls: edema right lateral pharyngeal wall with overhanging of mucosa over right supraglottis  Epiglottis / Aryepiglottic Folds: edematous epiglottis  lingual and laryngeal surface ; edema bilateral AE folds; R>L  Pyriform Sinus: Within normal limits  Vocal Cords: Within normal limits  Arytenoids: Within normal limits; moderate edema bilaterally, R > L       Path:    1. LYMPH NODE, EXTERNAL JUGULAR VEIN, EXCISION:       --ONE LYMPH NODE WITH NO MORPHOLOGIC EVIDENCE OF MALIGNANCY (0/1).     2. 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   --LARGEST PART 2 SPECIMEN METASTATIC DEPOSIT: APPROXIMATELY 31    MILLIMETERS.       --EXTRANODAL EXTENSION:  PRESENT.       --NO PERINEURAL INVASION IDENTIFIED.       --SKELETAL MUSCLE INVOLVED BY METASTATIC SQUAMOUS CELL CARCINOMA.   --PD-L1 STUDY AND EBV IMMUNOHISTOCHEMISTRY ARE PENDING AND WILL BE    REPORTED IN ADDENDA.     3. RIGHT NECK, FURTHER DESIGNATED "LEVELS 4 AND 5," DISSECTION:   --ONE OUT OF NINETEEN LYMPH NODES IS POSITIVE FOR METASTATIC    KERATINIZING SQUAMOUS CELL CARCINOMA         (1/19).   --LARGEST PART 3 SPECIMEN METASTATIC DEPOSIT: APPROXIMATELY 8    MILLIMETERS.       --EXTRANODAL EXTENSION:  PRESENT.       --NO PERINEURAL INVASION IDENTIFIED.       --SKELETAL MUSCLE WITH NO MORPHOLOGIC EVIDENCE OF MALIGNANCY.     Human Papillomavirus (HPV) Testing:   p16 Immunohistochemistry (IHC) as a Surrogate for Transcriptionally    Active High-Risk HPV: NEGATIVE (no staining identified).   EBV negative  PDL 1 positive (85)    [facility] [place]  IMPRESSIONS: Seven Freire presents with a functional oral swallow and mild to " moderate pharyngeal dysphagia. There was silent aspiration when he took a large volume consecutive sip of thin liquid and trace penetration with thin liquid when he took a large single sip.  There was no penetration or aspiration with any other consistency.  Aspiration occurred as a result of incomplete airway closure at the height of the swallow.  Valleculae residue clears with either a spontaneous or cued swallow and was greatest with solid consistency.      RECOMMENDATIONS: Mr. Freire and his wife, Nohemi, viewed the MBSS upon completion.  We discussed the normal swallow process and the change in the function of his swallow.  It was recommended that he continue eating a regular consistency diet moistening foods with sauce, gravy or jelly.  He should alternate liquids and solids and dry swallow between bites/sips.  He was encouraged to take small single sips of liquid at a time.  Recommendations were provided in writing. I also called his wife at home after reviewing the swallow study to reinforce small single sips of liquid given silent aspiration with thin liquid when he took large volume consecutive sips. It was recommended that he follow up with the out-patient speech therapist, Tory Grossman regarding swallowing treatment exercises for dysphagia and that he continue with his lymphedema therapy.  Secure chat was sent to Dr. Sellers regarding continuation of thin liquid and follow up for swallow treatment and she was in agreement with that plan.     Records reviewed: Dr Wallace     Assessment:   Unknown primary of the head and neck status post right modified radical neck dissection and pectoralis major Levar fascial flap--P 16 negative; PDL1 positive; ALEXANDREA  Marked submental edema-stable; using vest daily  Weight loss and dysphagia  Laryngeal edema  Alcohol abuse    Plan:  Synthroid 75 mcg/day  F/U 3 months

## 2023-04-04 NOTE — PROGRESS NOTES
"[facility]  Speech and Language Therapy Daily Note    Date: 4/4/2023    Start Time: 1005  Stop Time: 1100    Name: Seven Freire  MRN: [id number]  Therapy Diagnosis:   Encounter Diagnoses   Name Primary?    Other dysphagia     Pharyngeal dysphagia Yes    Lymphedema     History of head and neck cancer      Physician: Juanita Sellers MD  Medical Diagnosis: Pharyngeal dysphagia    Visit # : 2     Precautions:Standard, aspiration precautions    SUBJECTIVE:  Pt returns today for swallow therapy following MBS performed on 03/29/23 .   MBS rvealed   IMPRESSIONS: Seven Freire presents with a functional oral swallow and mild to moderate pharyngeal dysphagia. There was silent aspiration when he took a large volume consecutive sip of thin liquid and trace penetration with thin liquid when he took a large single sip.  There was no penetration or aspiration with any other consistency.  Aspiration occurred as a result of incomplete airway closure at the height of the swallow.  Valleculae residue clears with either a spontaneous or cued swallow and was greatest with solid consistency.    Pt reports "I don't know why I'm here, I'm doing fine swallowing at home." Pt reports he has been taking only small, single sips as recommended following MBS. He reports symptom of food sticking in his throat has resolved since last visit and he is able to tolerate a regular consistency diet and thin liquids using single sips. He reports "I can eat whatever I want." Pt does report continued changes in taste and appetite stating, "Sometimes I just dont feel like eating and I have to make myself eat something."    Response to previous treatment: Reinforcement required regarding Swallow POC.  Pain Scale: 0/10  Pain Location: no pain reported in throat or with swallowing  OBJECTIVE:      UNTIMED  Procedure Min.   Dysphagia therapy  55         Total Minutes: 55 min      Short Term Goals:   1.Pt will be " educated and trained regarding swallow exercises to improve laryngeal elevation and airway closure. Pt will perform exercises I'ly     Patient Education/Response:  Reviewed results of MBS using Dysphagia Francesca, discussed importance of follow through with Lymphedema treatment. Instructed pt regarding swallow exercises to improve airway protection.    Home Program:  HEP with swallow exercises provided.    ASSESSMENT:  Pt educated regarding results of MBS and swallow strategies - small single sips. Pt educated on importance of maintaining adequate oral intake for nutrition and to build strength and endurance. Pt reports he has gained 4 to 5 pounds since last visit. He verbalized understanding regarding importance of adequate oral intake.  Pt educated and trained in swallow exercises to focus on increasing laryngeal elevation for airway closure including effortful swallow, mendelsohn, breath hold and pitch glides. Following education, pt able to demonstrate all exercises I'ly with adequate technique. Pt verbalized importance of completing exercises daily but states it is hard for him to be compliant due to decreased energy and overall fatigue with healthcare requirements. Pt reports he is overwhelmed with the amount of appointments, recommendations and exercises he continues to have. ST stressed the importance of following up with PT for lymphedema and maintaining oral intake. Encouraged pt to complete swallow exercises at home. Pt reports he would try. He states he does not feel he needs to return for further instruction with exercises at this time.    Seven is progressing towards his goals. Pt reports improved tolerance with regular consistency foods and liquids using small sips. He reports no concerns with his swallow at this time. Pt to be seen as needed should swallow function decline or should he have questions regarding swallow exercises     Anticipated Barriers to Treatment: healthcare fatigue    Pt's spiritual,  cultural and educational needs considered and pt agreeable to plan of care and goals.    Medical necessity is demonstrated by the following:  Without skilled ST to treat Pt's swallow deficits, Pt is at risk for associated risks of dysphagia including aspiration pneumonia and malnutrition and decreased QOL      PLAN:    Discharge from ST at this time. Education with pt to notify MD/Speech Therapist should swallow function decline.      Therapist's Signature:  Tory Grossman CCC-SLP   4/4/2023

## 2023-04-17 ENCOUNTER — CLINICAL SUPPORT (OUTPATIENT)
Dept: REHABILITATION | Facility: HOSPITAL | Age: 68
End: 2023-04-17
Payer: MEDICARE

## 2023-04-17 DIAGNOSIS — I89.0 LYMPHEDEMA: ICD-10-CM

## 2023-04-17 DIAGNOSIS — M25.511 PAIN IN JOINT OF RIGHT SHOULDER: ICD-10-CM

## 2023-04-17 PROCEDURE — 97110 THERAPEUTIC EXERCISES: CPT | Mod: PN

## 2023-04-17 NOTE — PROGRESS NOTES
[facility]  Physical Therapy Progress Note/Reassessment  Lymphedema Therapy    Visit Date: 4/17/2023    Name: Seven Freire  MRN: [id number]  Therapy Diagnosis:   Encounter Diagnoses   Name Primary?    Lymphedema     Pain in joint of right shoulder        Physician: Juanita Sellers MD    Evaluation Date: 2/23/23  Reassess: 03/15/2023    Visit #/Visits authorized: pending   Authorization Dates: pending  Plan of Care Expiration: 10 visits/ 30days  MD order: PT Eval and Treat       Visit: 8 / 18  PTA Visit: 0/ 5  Time In: 02:02 PM  Time Out: 03:00 PM  Total Billable Time: 58 minutes    Precautions: Standard, cancer , lymphedema    Subjective     Pt reports: Dr. Sellers started me on a new medication for my thyroid last Monday. I've been wearing the night time compression like ya'll told me, I feel like that give me better results than the machine. I know exercises will help too but just haven't gotten to do much, I have a treadmill at home but haven't been using it.   Seeing a pain management doctor that Dr. Boudreaux referred me too for my low back and right shoulder.   Right now having minimal pain in my shoulder, but I also took some pan medication before I came. The right shoulder tightness is better, and the pain really doesn't limit me at home.   My goal for coming to therapy is really for some energy, I just want to sit on my couch at home.   .    Pain: 4/10  Location: cervical spine/right shoulder    Objective     Reassessment:  04/17/2023 6MWT     Evaluation:  02/23/2023 6MWT .1594 feet O2 sat 93% and HR 99 bpm after ambulation.    Shoulder Range of Motion:   ACTIVE ROM LEFT EVAL 4/26/2022 RIGHT EVAL 4/26/2022 LEFT Re-Assess  6/17/2022 RIGHT Re- Assess  06/17/2022 LEFT EVAL 2/23/2023 RIGHT EVAL 2/23/2023 LEFT  Reasses  03/15/2023 RIGHT   Reassess  03/15/2023 LEFT  Reassess  04/17/2023 RIGHT   Reassess  04/17/2023   Flexion 170 155 164 150 160 deg 160 deg 165 165 170 165   Abduction 170  145 (P) 173 140 with paid during movement and  deg 170 deg 170 170 170 170   Extension WNL WNL WNL WNL             IR WNL WNL WNL WNL WFL WFL         ER WNL WNL 55 55 73 72 73 73 73 73      STRENGTH LEFT  EVAL 4/26/2022 RIGHT EVAL 4/26/2022 LEFT Re-Assess  6/17/2022 RIGHT Re- Assess  06/17/2022 LEFT EVAL 2/23/2023 RIGHT EVAL 2/23/2023 LEFT Reassess  03/15/2023 RIGHT   Reassess  03/15/2023 LEFT Reassess  04/17/2023 RIGHT Reassess 04/17/2023   Flexion 5/5 4/5 5/5 4/5 5/5 5/5 5/5 5/5 5/5 5/5   Abduction 5/5 4-/5 5/5 4-/5 5/5 4/5 5/5 4+/5 5/5 4+/5   Extension 5/5 4/5 5/5 4/5 5/5 4+/5 5/5 4+/5 5/5 4+/5   IR (neutral) 4+/5 4/5 5/5 4/5 5/5 5/5 5/5 5/5 5/5 5/5   ER (neutral) 4+/5 4/5 4+/5 4/5 5/5 4+/5 5/5 4+/5 5/5 5/5   Middle Trap 4+/5 4/5 4+/5 4/5 4+/5 4-/5 4+/5 4/4 4+/5 4/5   Lower Trap 4+/5 4/5 4/5 4-/5 4/5 3+/5 4+/5 4/5 4+/5 4/5    No pain R UE with MMT    CERVICAL SPINE AROM:     eval 4/26/2022  Re-assess 6/17/2022 EVAL 2/23/23 Reassess  03/15/2023 Reassess  04/17/2023   Flexion: WNL  WNL WNL WNL WNL   Extension: 20 deg 20 deg 35 deg 40 40   Left Sidebend: 18 deg 16 deg 15 deg 15 25   Right Sidebend: 20 deg 18 deg 10 deg 15 20   Left Rotation: 35 deg 38 deg 50 deg 50 55   Right Rotation: 40 deg 55 deg 56 deg 50 50   Mandibular depression   50 cm 35cm 40 mm 44 mm   Improved ROM and mandibular depression noted     Girth Measurements (in centimeters)  LANDMARK Eval 4/26/2022 Re-assess 6/17/2022 EVAL 2/23/2023 Reassess  03/15/2023 Reassess  04/17/2023   2 in below earlobe 52.0 cm 49.0 cm 46.2 49.0 47.5   3 in below earlobe  50.0 cm 47.7 cm 44.2 47.4 45.8   4 in below earlobe 49.5 cm 45.0 cm 45.1 43.8 44.0   Improved reduction to 2 of 3 landmarks, increased firmness throughout submental region with fullness to right submandibular region.       TREATMENT  O2 sat 95% and HR 97 bpm at rest     Deferred:  Manual Therapy to develop flexibility, extensibility, pliability, and contour for 35 minutes including:   Manual  lymphatic drainage to bilateral short neck series, cervical terminus , axilla, sternal nodes, paravertebral nodes, submental nodes, pre- and retro auricular nodes with facial sequence,  AIA anastomosis , RUTH anastomosis , and rework accessing all watershed areas on trunk.  Lymphatouch applied to cervical terminus, lateral cervical triangle, submental region bilaterally, anterior neck set at 55mmHg for 15 minutes.  Rock tape applied to anchored at superior border of scapula extending to lateral and anterior neck/submental region, second strip anchored at right cervical terminus extending in weave pattern to anterior neck  to improve lymphangiomotorcity 10 min Pt instructed to remove within 72 hours or sooner if it begins to irritate skin.   Previously Reviewed with pt self MLD hand placements to cervical terminus, B axilla, lateral neck B  and from neck to chest (handout also provided again for review)    Therapeutic Exercise to develop strength, ROM, flexibility, and posture for 50 minutes including:  MMT  6MWT  UBE L2 x 6' (alt direction ea minute) 96 bpm, O2 97%  Wall slides with towel R shoulder flexion 10 sec hold x 10 reps (pain reported to anterior shoulder after)  Scap retractions with RTB 3 x 10  Straight arm pulldowns with RTB 3 x 10  Bilateral ER with RTB 2 x 10      Deferred due to time:  Stat bike 12 min L2   bpm, O2 96 %  Wall Ball Roll up (red physioball) 10 sec hold x10 reps   Deep diaphragm breathing  Supine Chin tucks  Grimacing ex in supine and sitting 10 reps each  In supine Cervical rotations R/L  Seated shoulder rolls x 10    Deferred:  Self Care/Education/ADL training 10 minutes: Educated pt and wife of using all parts of Flexitouch (vest and head piece for max benefit) during entire treatment at home (was only using just head piece for a while.)  PT spoke with pts wife at length regarding increasing use of his pump from just in the evenings to mornings (when feeling most full),  "importance of performing self MLD daily  to increase lymphatic flow (encouraged pt to perform when in the shower, or whenever easiest to remember.) PT also reiterated to pt/wife benefit of wearing compression garment at night time to assist in decongesting and prevent return of fullness. Pt verbalized understanding of needing to improve his compliance for better results.   Pt states "I feel like always after PT treatment it feels softer and will continue to feel less swollen as the day goes on."      EDUCATION  Education provided:   - Progress towards goals   - Role of therapy   - Activity modification  - Lymphedema information (definitions, signs, symptoms, precautions), purpose of lymphedema physical therapy and the benefits/risks of treatment    Written Home Exercises Provided:  Provided pt with additional handout of self MLD techniques, reviewed with pt steps 1-3, will continue with remaining steps during following visits. .  Exercises were reviewed and Seven was able to demonstrate them prior to the end of the session.  Seven demonstrated good  understanding of the education provided.   See EMR under Patient Instructions for exercises provided 3/2/2023.    Assessment   Pt reports improved compliance with wearing night time compression consistently every night, pleased with response. Pt reports compliance with using his pump 1x a day. Pt overall improved R UE shoulder ROM, however presents with mid trap/lower trap weakness, anterior position of right humeral head and pain to anterior right shoulder, with forward elevation after repetitive motion. Pt would benefit from scapular and glenohumeral stabilization, postural exercises in order to improve functional mobility with less pain. Pt reports continued poor endurance at home and self motivation to exercise.    PT to confirm with pt compliance and proficiency of self MLD and benefit of performing self MLD at home, utilizing compression pump 1-2x day with " importance of maintaining consistancy with home program, wearing night time compression every night for best results.      Seven Is progressing well towards his goals. Pt will continue to benefit from skilled outpatient physical therapy to address the deficits listed in the problem list box on initial evaluation, provide pt/family education and to maximize pt's level of independence in the home and community environment.   Pt prognosis is Good.     Patient reports having difficulty with eating and has lost some weight. Dr. Sellers wanted me to return to PT. Pt has decreased mandibular depression limiting opening of mouth, scapular stabilization weakness more noted on R as compared to L and fullness with decreased tissue pliability in submental/ anterior neck region.Poor level of endurance which could be improved.   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 Patient completed  chemotherapy 1x a week and 30 tx of radiation at [facility] in [place]. Pt primary complaint  Patient presents with lymphedema of the head/neck with fullness to submandibular region and anterior/lateral neck, increased pain, increased stiffness in the right shoulder and cervical range of motion, as well as difficulty performing overhead reaching with flexion and abduction ranges and placing the pt at rist of higher infection.    Plan of care discussed with patient: Yes  Pt's spiritual, cultural and educational needs considered and patient is agreeable to the plan of care and goals.    Anticipated Barriers for therapy: none    GOALS  Short Term Goals: (3 weeks) 03/15/2023  1. Decrease cervical girth by 1 cm (Not met, girth measures increased in 2 of 3 landmarks)  2. Patient will demonstrate 100% knowledge of lymphedema precautions and signs of infection.   3. Patient demonstrates  independence with Postural Awareness.   4. Patient will perform self lymph drainage techniques. (Non-compliant)  5. Patient demonstrates independence with HEP.         Long Term Goals: (6 weeks) (IN progress)  1. Decrease cervical girth by 2 cm.  2. Patient will perform self lymph drainage techniques independently.  3. Patient to tomas/doff compression garment.   4. Patient demonstrates increased B scapular stabilization to at least 4+/5   5. Patient demonstrates increased mandibular depression to 50 mm   6. Patient demonstrates increased strength BUE's to 4+/5 or greater to improve tolerance to functional activities pain free.   7. Patient to report improvement in overall endurance, participating in 30 minute walking program 3-5x week.     Updated Goals: (4 Weeks) 04/17/2023-05/17/2023  Improved R mid trap/lower trap strength to 4+/5 or greater.  Decrease R shoulder pain to 2-3/10 or less with AROM  Patient to report improvement in overall endurance, participating in 30 minute walking program 3-5x week.       Plan   Assess 30 sec sit to stand test.   Recommend 2x week/4 weeks 04/17/2023-05/17/2023     Sheryl Osorio PT , CLT

## 2023-04-18 ENCOUNTER — TELEPHONE (OUTPATIENT)
Dept: HEMATOLOGY/ONCOLOGY | Facility: CLINIC | Age: 68
End: 2023-04-18
Payer: MEDICARE

## 2023-04-18 DIAGNOSIS — C80.1 SECONDARY SQUAMOUS CELL CARCINOMA OF HEAD AND NECK WITH UNKNOWN PRIMARY SITE: Primary | ICD-10-CM

## 2023-04-18 DIAGNOSIS — C79.89 SECONDARY SQUAMOUS CELL CARCINOMA OF HEAD AND NECK WITH UNKNOWN PRIMARY SITE: Primary | ICD-10-CM

## 2023-04-18 DIAGNOSIS — I89.0 LYMPHEDEMA: ICD-10-CM

## 2023-04-19 ENCOUNTER — CLINICAL SUPPORT (OUTPATIENT)
Dept: REHABILITATION | Facility: HOSPITAL | Age: 68
End: 2023-04-19
Payer: MEDICARE

## 2023-04-19 DIAGNOSIS — I89.0 LYMPHEDEMA: ICD-10-CM

## 2023-04-19 DIAGNOSIS — M25.511 PAIN IN JOINT OF RIGHT SHOULDER: Primary | ICD-10-CM

## 2023-04-19 PROCEDURE — 97110 THERAPEUTIC EXERCISES: CPT | Mod: PN

## 2023-04-19 NOTE — PROGRESS NOTES
[facility]  Physical Therapy Progress Note/Reassessment  Lymphedema Therapy    Visit Date: 4/19/2023    Name: Seven Freire  MRN: [id number]  Therapy Diagnosis:   Encounter Diagnoses   Name Primary?    Pain in joint of right shoulder Yes    Lymphedema        Physician: Juanita Sellers MD    Evaluation Date: 2/23/23  Reassess: 03/15/2023  Re-assess: 04/17/2023  Visit #/Visits authorized: pending   Authorization Dates: pending  Plan of Care Expiration: 10 visits/ 30days  MD order: PT Eval and Treat       Visit: 9 / 18  PTA Visit: 0/ 5  Time In: 02:03 PM  Time Out: 03:00 PM  Total Billable Time: 57 minutes    Precautions: Standard, cancer , lymphedema    Subjective     Pt reports:   I took a pain pill  before I came down here.   Seeing a pain management doctor that Dr. Boudreaux referred me too for my low back and right shoulder.   Right now having pain in my right shoulder.  The right shoulder tightness is better, and the pain really doesn't limit me at home.     .    Pain: 4/10  Location: cervical spine/right shoulder/ lower back     Objective       TREATMENT  O2 sat 91% and HR 87 bpm at rest     Deferred:  Manual Therapy to develop flexibility, extensibility, pliability, and contour for 35 minutes including:   Manual lymphatic drainage to bilateral short neck series, cervical terminus , axilla, sternal nodes, paravertebral nodes, submental nodes, pre- and retro auricular nodes with facial sequence,  AIA anastomosis , RUTH anastomosis , and rework accessing all watershed areas on trunk.  Lymphatouch applied to cervical terminus, lateral cervical triangle, submental region bilaterally, anterior neck set at 55mmHg for 15 minutes.  Rock tape applied to anchored at superior border of scapula extending to lateral and anterior neck/submental region, second strip anchored at right cervical terminus extending in weave pattern to anterior neck  to improve lymphangiomotorcity 10 min Pt instructed to  remove within 72 hours or sooner if it begins to irritate skin.   Previously Reviewed with pt self MLD hand placements to cervical terminus, B axilla, lateral neck B  and from neck to chest (handout also provided again for review)    Therapeutic Exercise to develop strength, ROM, flexibility, and posture for 55 minutes including:  Nu step L2 10 min with post HR 85 and O2 90 with verbal cues for increase in breathing  UBE L2 x 6' (alt direction ea minute) 94 bpm, O2 97%  Wall slides with towel R shoulder flexion 10 sec hold x 10 reps (pain reported to anterior shoulder after)  Scap retractions with RTB 3 x 10  Straight arm pulldowns with RTB 3 x 10 deferred  Tricep ext with red tband 2x 10 B UE  Bilateral ER with RTB 2 x 10  8 reps sit to stand test 30 sec   Trunk rotation 3 plane center, feet to left fet to right 10 reps each  Post pelvic tilts 10 sec hold 10 reps  Glut sets 10 reps in supine  Diaphragmatic breathing  Trunk flexion with ball roll outs in sitting x 10     Deferred due to time:  Stat bike 12 min L2   bpm, O2 96 %  Wall Ball Roll up (red physioball) 10 sec hold x10 reps   Deep diaphragm breathing  Supine Chin tucks  Grimacing ex in supine and sitting 10 reps each  In supine Cervical rotations R/L  Seated shoulder rolls x 10    Deferred:  Self Care/Education/ADL training 10 minutes: Educated pt and wife of using all parts of Flexitouch (vest and head piece for max benefit) during entire treatment at home (was only using just head piece for a while.)  PT spoke with pts wife at length regarding increasing use of his pump from just in the evenings to mornings (when feeling most full), importance of performing self MLD daily  to increase lymphatic flow (encouraged pt to perform when in the shower, or whenever easiest to remember.) PT also reiterated to pt/wife benefit of wearing compression garment at night time to assist in decongesting and prevent return of fullness. Pt verbalized understanding of  "needing to improve his compliance for better results.   Pt states "I feel like always after PT treatment it feels softer and will continue to feel less swollen as the day goes on."      EDUCATION  Education provided:   - Progress towards goals   - Role of therapy   - Activity modification  - Lymphedema information (definitions, signs, symptoms, precautions), purpose of lymphedema physical therapy and the benefits/risks of treatment    Written Home Exercises Provided:  Provided pt with additional handout of self MLD techniques, reviewed with pt steps 1-3, will continue with remaining steps during following visits. .  Exercises were reviewed and Seven was able to demonstrate them prior to the end of the session.  Seven demonstrated good  understanding of the education provided.   See EMR under Patient Instructions for exercises provided 3/2/2023.    Assessment   Pt overall improved R UE shoulder ROM, however presents with mid trap/lower trap weakness, anterior position of right humeral head and pain to anterior right shoulder, with forward elevation after repetitive motion. Pt would benefit from scapular and glenohumeral stabilization, postural exercises in order to improve functional mobility with less pain. Pt reports continued poor endurance at home and self motivation to exercise.    PT to confirm with pt compliance and proficiency of self MLD and benefit of performing self MLD at home, utilizing compression pump 1-2x day with importance of maintaining consistancy with home program, wearing night time compression every night for best results.      Seven Is progressing well towards his goals. Pt will continue to benefit from skilled outpatient physical therapy to address the deficits listed in the problem list box on initial evaluation, provide pt/family education and to maximize pt's level of independence in the home and community environment.   Pt prognosis is Good.     Patient reports having difficulty with " eating and has lost some weight. Dr. Sellers wanted me to return to PT. Pt has decreased mandibular depression limiting opening of mouth, scapular stabilization weakness more noted on R as compared to L and fullness with decreased tissue pliability in submental/ anterior neck region.Poor level of endurance which could be improved.   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 Patient completed  chemotherapy 1x a week and 30 tx of radiation at [facility] in [place]. Pt primary complaint  Patient presents with lymphedema of the head/neck with fullness to submandibular region and anterior/lateral neck, increased pain, increased stiffness in the right shoulder and cervical range of motion, as well as difficulty performing overhead reaching with flexion and abduction ranges and placing the pt at rist of higher infection.    Plan of care discussed with patient: Yes  Pt's spiritual, cultural and educational needs considered and patient is agreeable to the plan of care and goals.    Anticipated Barriers for therapy: none    GOALS  Short Term Goals: (3 weeks) 03/15/2023  1. Decrease cervical girth by 1 cm (Not met, girth measures increased in 2 of 3 landmarks)  2. Patient will demonstrate 100% knowledge of lymphedema precautions and signs of infection.   3. Patient demonstrates independence with Postural Awareness.   4. Patient will perform self lymph drainage techniques. (Non-compliant)  5. Patient demonstrates independence with HEP.         Long Term Goals: (6 weeks) (IN progress)  1. Decrease cervical girth by 2 cm.  2. Patient will perform self lymph drainage techniques independently.  3. Patient to tomas/doff compression garment.   4. Patient demonstrates increased B scapular stabilization to at least 4+/5   5. Patient demonstrates increased mandibular  depression to 50 mm   6. Patient demonstrates increased strength BUE's to 4+/5 or greater to improve tolerance to functional activities pain free.   7. Patient to report improvement in overall endurance, participating in 30 minute walking program 3-5x week.     Updated Goals: (4 Weeks) 04/17/2023-05/17/2023 IN PROGRESS  Improved R mid trap/lower trap strength to 4+/5 or greater.  Decrease R shoulder pain to 2-3/10 or less with AROM  Patient to report improvement in overall endurance, participating in 30 minute walking program 3-5x week.       Plan     Recommend 2x week/4 weeks 04/17/2023-05/17/2023     Laurence Bahena, PT , CLT

## 2023-04-24 ENCOUNTER — CLINICAL SUPPORT (OUTPATIENT)
Dept: REHABILITATION | Facility: HOSPITAL | Age: 68
End: 2023-04-24
Payer: MEDICARE

## 2023-04-24 DIAGNOSIS — M25.511 PAIN IN JOINT OF RIGHT SHOULDER: Primary | ICD-10-CM

## 2023-04-24 DIAGNOSIS — Z85.89 HISTORY OF HEAD AND NECK CANCER: ICD-10-CM

## 2023-04-24 DIAGNOSIS — I89.0 LYMPHEDEMA: ICD-10-CM

## 2023-04-24 PROCEDURE — 97110 THERAPEUTIC EXERCISES: CPT | Mod: PN

## 2023-04-24 NOTE — PROGRESS NOTES
"[facility] / [facility]  Physical Therapy Progress Note  Lymphedema Therapy    Visit Date: 4/24/2023    Name: Seven Freire  MRN: [id number]  Therapy Diagnosis:   Encounter Diagnoses   Name Primary?    Pain in joint of right shoulder Yes    Lymphedema     History of head and neck cancer        Physician: Juanita Sellers MD    Evaluation Date: 2/23/23  Reassess: 03/15/2023  Re-assess: 04/17/2023  Visit #/Visits authorized: pending   Authorization Dates: pending  Plan of Care Expiration: 10 visits/ 30days  MD order: PT Eval and Treat       Visit: 10 / 18  PTA Visit: 0/ 5  Time In: 09:00 AM  Time Out: 09:55 AM  Total Billable Time: 55 minutes    Precautions: Standard, cancer , lymphedema    Subjective     Pt reports: "I had to fast this morning so haven't had any coffee yet. Have to  have blood work today. Also having a PET scan tomorrow."  "Didn't take anything for pain today, the right shoulder is about a 4/10 this morning." "The garment ya'll recommended me to wear at night really helps, I don't feel real swollen this morning."    Pain: 4/10  Location: cervical spine/right shoulder/ lower back     Objective       TREATMENT  O2 sat 91% and HR 87 bpm at rest     Deferred:  Manual Therapy to develop flexibility, extensibility, pliability, and contour for 35 minutes including:   Manual lymphatic drainage to bilateral short neck series, cervical terminus , axilla, sternal nodes, paravertebral nodes, submental nodes, pre- and retro auricular nodes with facial sequence,  AIA anastomosis , RUTH anastomosis , and rework accessing all watershed areas on trunk.  Lymphatouch applied to cervical terminus, lateral cervical triangle, submental region bilaterally, anterior neck set at 55mmHg for 15 minutes.  Rock tape applied to anchored at superior border of scapula extending to lateral and anterior neck/submental region, second strip anchored at right cervical terminus extending in weave pattern to anterior neck  " to improve lymphangiomotorcity 10 min Pt instructed to remove within 72 hours or sooner if it begins to irritate skin.   Previously Reviewed with pt self MLD hand placements to cervical terminus, B axilla, lateral neck B  and from neck to chest (handout also provided again for review)    Therapeutic Exercise to develop strength, ROM, flexibility, and posture for 55 minutes including:  Nu step L2 10 min with post HR 85 and O2 92 with verbal cues for increase in breathing (recovered to 97% after Diaphragm breathing instruction)  6 minutes of instruction Deep Diaphragm breathing with verbal and tactile cue instruction for proper technique (1 hand on chest/1 hand on abdomen) (continues to require practice)  UBE L2 x 8' (alt direction ea minute) 94 bpm, O2 92% (verbal instruction given for proper diaphragm breathing) O2 sats recovered to 96%  Wall slides with towel R shoulder flexion 10 sec hold x 10 reps (pain reported to anterior shoulder after)-deferred    Seated:  Scap retractions with GTB 3 x 10  Straight arm pulldowns with RTB 3 x 10   Tricep ext with red tband 2x 15 B UE  Bilateral ER with RTB 3 x 10  Supine serratus presses R UE AAROM for proper technique required 2x10 reps    Deferred due to time:  8 reps sit to stand test 30 sec   Trunk rotation 3 plane center, feet to left fet to right 10 reps each  Post pelvic tilts 10 sec hold 10 reps  Glut sets 10 reps in supine  Diaphragmatic breathing  Trunk flexion with ball roll outs in sitting x 10   Stat bike 12 min L2   bpm, O2 96 %  Supine Chin tucks  Grimacing ex in supine and sitting 10 reps each  In supine Cervical rotations R/L  Seated shoulder rolls x 10    Deferred:  Self Care/Education/ADL training 10 minutes: Educated pt and wife of using all parts of Flexitouch (vest and head piece for max benefit) during entire treatment at home (was only using just head piece for a while.)  PT spoke with pts wife at length regarding increasing use of his pump from  "just in the evenings to mornings (when feeling most full), importance of performing self MLD daily  to increase lymphatic flow (encouraged pt to perform when in the shower, or whenever easiest to remember.) PT also reiterated to pt/wife benefit of wearing compression garment at night time to assist in decongesting and prevent return of fullness. Pt verbalized understanding of needing to improve his compliance for better results.   Pt states "I feel like always after PT treatment it feels softer and will continue to feel less swollen as the day goes on."      EDUCATION  Education provided:   - Progress towards goals   - Role of therapy   - Activity modification  - Lymphedema information (definitions, signs, symptoms, precautions), purpose of lymphedema physical therapy and the benefits/risks of treatment    Written Home Exercises Provided:  Provided pt with additional handout of self MLD techniques, reviewed with pt steps 1-3, will continue with remaining steps during following visits. .  Exercises were reviewed and Seven was able to demonstrate them prior to the end of the session.  Seven demonstrated good  understanding of the education provided.   See EMR under Patient Instructions for exercises provided 3/2/2023.    Assessment   Patient demonstrated shallow diaphragm breathing, compensates with upper respiratory, extra time spent today with practice of deep diaphragm breathing with verbal and tactile cues needed. Continued practice needed. Pt overall improved R UE shoulder ROM, however presents with R winging scapula, mid trap/lower trap weakness, anterior position of right humeral head and pain to anterior right shoulder, with forward elevation after repetitive motion. Pt would benefit from scapular and glenohumeral stabilization, postural exercises in order to improve functional mobility with less pain. Pt reports continued poor endurance at home and self motivation to exercise.    PT to confirm with pt " compliance and proficiency of self MLD and benefit of performing self MLD at home, utilizing compression pump 1-2x day with importance of maintaining consistancy with home program, wearing night time compression every night for best results.      Seven Is progressing well towards his goals. Pt will continue to benefit from skilled outpatient physical therapy to address the deficits listed in the problem list box on initial evaluation, provide pt/family education and to maximize pt's level of independence in the home and community environment.   Pt prognosis is Good.     Patient reports having difficulty with eating and has lost some weight. Dr. Sellers wanted me to return to PT. Pt has decreased mandibular depression limiting opening of mouth, scapular stabilization weakness more noted on R as compared to L and fullness with decreased tissue pliability in submental/ anterior neck region.Poor level of endurance which could be improved.   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 Patient completed  chemotherapy 1x a week and 30 tx of radiation at [facility] in [place]. Pt primary complaint  Patient presents with lymphedema of the head/neck with fullness to submandibular region and anterior/lateral neck, increased pain, increased stiffness in the right shoulder and cervical range of motion, as well as difficulty performing overhead reaching with flexion and abduction ranges and placing the pt at rist of higher infection.    Plan of care discussed with patient: Yes  Pt's spiritual, cultural and educational needs considered and patient is agreeable to the plan of care and goals.    Anticipated Barriers for therapy: none    GOALS  Short Term Goals: (3 weeks) 03/15/2023  1. Decrease cervical girth by 1 cm (Not met, girth measures increased in 2 of 3  landmarks)  2. Patient will demonstrate 100% knowledge of lymphedema precautions and signs of infection.   3. Patient demonstrates independence with Postural Awareness.   4. Patient will perform self lymph drainage techniques. (Non-compliant)  5. Patient demonstrates independence with HEP.         Long Term Goals: (6 weeks) (IN progress)  1. Decrease cervical girth by 2 cm.  2. Patient will perform self lymph drainage techniques independently.  3. Patient to tomas/doff compression garment.   4. Patient demonstrates increased B scapular stabilization to at least 4+/5   5. Patient demonstrates increased mandibular depression to 50 mm   6. Patient demonstrates increased strength BUE's to 4+/5 or greater to improve tolerance to functional activities pain free.   7. Patient to report improvement in overall endurance, participating in 30 minute walking program 3-5x week.     Updated Goals: (4 Weeks) 04/17/2023-05/17/2023 IN PROGRESS  Improved R mid trap/lower trap strength to 4+/5 or greater.  Decrease R shoulder pain to 2-3/10 or less with AROM  Patient to report improvement in overall endurance, participating in 30 minute walking program 3-5x week.       Plan     Recommend 2x week/4 weeks 04/17/2023-05/17/2023     Sheryl Osorio PT , CLT

## 2023-04-26 ENCOUNTER — CLINICAL SUPPORT (OUTPATIENT)
Dept: REHABILITATION | Facility: HOSPITAL | Age: 68
End: 2023-04-26
Payer: MEDICARE

## 2023-04-26 DIAGNOSIS — M25.511 PAIN IN JOINT OF RIGHT SHOULDER: Primary | ICD-10-CM

## 2023-04-26 DIAGNOSIS — I89.0 LYMPHEDEMA: ICD-10-CM

## 2023-04-26 DIAGNOSIS — C80.1 SECONDARY SQUAMOUS CELL CARCINOMA OF HEAD AND NECK WITH UNKNOWN PRIMARY SITE: ICD-10-CM

## 2023-04-26 DIAGNOSIS — C79.89 SECONDARY SQUAMOUS CELL CARCINOMA OF HEAD AND NECK WITH UNKNOWN PRIMARY SITE: ICD-10-CM

## 2023-04-26 PROCEDURE — 97140 MANUAL THERAPY 1/> REGIONS: CPT | Mod: PN,CQ

## 2023-04-26 PROCEDURE — 97110 THERAPEUTIC EXERCISES: CPT | Mod: PN,CQ

## 2023-04-26 NOTE — PROGRESS NOTES
"[facility]  Physical Therapy Progress Note  Lymphedema Therapy    Visit Date: 4/26/2023    Name: Seven Freire  MRN: [id number]  Therapy Diagnosis:   Encounter Diagnoses   Name Primary?    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     Pain in joint of right shoulder Yes       Physician: Juanita Sellers MD    Evaluation Date: 2/23/23  Reassess: 03/15/2023  Re-assess: 04/17/2023  Visit #/Visits authorized: pending   Authorization Dates: pending  Plan of Care Expiration: 10 visits/ 30days  MD order: PT Eval and Treat       Visit: 11 / 18  PTA Visit: 1/ 5  Time In: 11:05 AM  Time Out: 12:00 PM  Total Billable Time: 55 minutes    Precautions: Standard, cancer , lymphedema    Subjective     Pt reports:  "The garment ya'll recommended me to wear at night really helps, I don't feel as swollen this morning." Pt reports that he hasn't used his pump for two days, but usually does use it at least 6 times a week. The garment seems to help more that anything. Right shoulder continues to hurt, uses 800 mg Ibuprofen when the pain is bad. Biggest issue is lack of energy; have been this way since chemo/radiation. Pt admits that he hasn't been doing self MLD; have the sheet that explains how to do it.     Pain: 4/10  Location: cervical spine/right shoulder/ lower back     Objective       TREATMENT  O2 sat 93% and HR 66 bpm at rest     Therapeutic Exercise to develop strength, ROM, flexibility, and posture for 40 minutes including:  Nu step L2 10 min with post HR 77 and O2 92  6 minutes of instruction Deep Diaphragm breathing with verbal and tactile cue instruction for proper technique (1 hand on chest/1 hand on abdomen) (continues to require practice)  UBE L2 x 8' (alt direction ea minute) 78 bpm, O2 92% (verbal instruction given for proper diaphragm breathing) O2 sats recovered to 96%  Wall slides with towel R shoulder flexion 10 sec hold x 10 reps  Wall " slides with towel right shoulder abduction x 10    Standing:   Scap retractions with GTB 3 x 10  Straight arm pulldowns with RTB 3 x 10   Tricep ext with red tband 2x 15 B UE  Bilateral ER with RTB 3 x 10    Supine:  Supine serratus presses R UE AAROM for proper technique required 2x10 reps  Side lying scapula retraction/depression x 10 reps with 10 sec hold    Manual stretching of right shoulder, with tightness at end range flexion/abduction      Manual Therapy to develop flexibility, extensibility, pliability, and contour for 15 minutes including:   Manual lymphatic drainage to bilateral short neck series, cervical terminus , axilla, sternal nodes, paravertebral nodes, submental nodes, pre- and retro auricular nodes with facial sequence,  AIA anastomosis , RUTH anastomosis , and rework accessing all watershed areas on trunk. Discussed that pt can do self MLD in the shower.     Deferred:  Lymphatouch applied to cervical terminus, lateral cervical triangle, submental region bilaterally, anterior neck set at 55mmHg for 15 minutes.  Rock tape applied to anchored at superior border of scapula extending to lateral and anterior neck/submental region, second strip anchored at right cervical terminus extending in weave pattern to anterior neck  to improve lymphangiomotorcity 10 min Pt instructed to remove within 72 hours or sooner if it begins to irritate skin.   Previously Reviewed with pt self MLD hand placements to cervical terminus, B axilla, lateral neck B  and from neck to chest (handout also provided again for review)  Deferred due to time:  8 reps sit to stand test 30 sec   Trunk rotation 3 plane center, feet to left fet to right 10 reps each  Post pelvic tilts 10 sec hold 10 reps  Glut sets 10 reps in supine  Diaphragmatic breathing  Trunk flexion with ball roll outs in sitting x 10   Stat bike 12 min L2   bpm, O2 96 %  Supine Chin tucks  Grimacing ex in supine and sitting 10 reps each  In supine Cervical  "rotations R/L  Seated shoulder rolls x 10    Deferred:  Self Care/Education/ADL training 10 minutes: Educated pt and wife of using all parts of Flexitouch (vest and head piece for max benefit) during entire treatment at home (was only using just head piece for a while.)  PT spoke with pts wife at length regarding increasing use of his pump from just in the evenings to mornings (when feeling most full), importance of performing self MLD daily  to increase lymphatic flow (encouraged pt to perform when in the shower, or whenever easiest to remember.) PT also reiterated to pt/wife benefit of wearing compression garment at night time to assist in decongesting and prevent return of fullness. Pt verbalized understanding of needing to improve his compliance for better results.   Pt states "I feel like always after PT treatment it feels softer and will continue to feel less swollen as the day goes on."      EDUCATION  Education provided:   - Progress towards goals   - Role of therapy   - Activity modification  - Lymphedema information (definitions, signs, symptoms, precautions), purpose of lymphedema physical therapy and the benefits/risks of treatment    Written Home Exercises Provided:  Provided pt with additional handout of self MLD techniques, reviewed with pt steps 1-3, will continue with remaining steps during following visits. .  Exercises were reviewed and Seven was able to demonstrate them prior to the end of the session.  Seven demonstrated good  understanding of the education provided.   See EMR under Patient Instructions for exercises provided 3/2/2023.    Assessment   Patient demonstrated shallow diaphragm breathing, compensates with upper respiratory, extra time spent today with practice of deep diaphragm breathing with verbal and tactile cues needed. Continued practice needed. Pt overall improved R UE shoulder ROM, however presents with R winging scapula, mid trap/lower trap weakness, anterior position of " right humeral head and pain to anterior right shoulder, with forward elevation after repetitive motion. Pt would benefit from scapular and glenohumeral stabilization, postural exercises in order to improve functional mobility with less pain. Pt reports continued poor endurance at home and self motivation to exercise.    PT to confirm with pt compliance and proficiency of self MLD and benefit of performing self MLD at home, utilizing compression pump 1-2x day with importance of maintaining consistancy with home program, wearing night time compression every night for best results.      Seven Is progressing well towards his goals. Pt will continue to benefit from skilled outpatient physical therapy to address the deficits listed in the problem list box on initial evaluation, provide pt/family education and to maximize pt's level of independence in the home and community environment.   Pt prognosis is Good.     Patient reports having difficulty with eating and has lost some weight. Dr. Sellers wanted me to return to PT. Pt has decreased mandibular depression limiting opening of mouth, scapular stabilization weakness more noted on R as compared to L and fullness with decreased tissue pliability in submental/ anterior neck region.Poor level of endurance which could be improved.   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 Patient completed  chemotherapy 1x a week and 30 tx of radiation at [facility] in [place]. Pt primary complaint  Patient presents with lymphedema of the head/neck with fullness to submandibular region and anterior/lateral neck, increased pain, increased stiffness in the right shoulder and cervical range of motion, as well as difficulty performing overhead reaching with flexion and abduction ranges and placing the pt at rist of higher  infection.    Plan of care discussed with patient: Yes  Pt's spiritual, cultural and educational needs considered and patient is agreeable to the plan of care and goals.    Anticipated Barriers for therapy: none    GOALS  Short Term Goals: (3 weeks) 03/15/2023  1. Decrease cervical girth by 1 cm (Not met, girth measures increased in 2 of 3 landmarks)  2. Patient will demonstrate 100% knowledge of lymphedema precautions and signs of infection.   3. Patient demonstrates independence with Postural Awareness.   4. Patient will perform self lymph drainage techniques. (Non-compliant)  5. Patient demonstrates independence with HEP.         Long Term Goals: (6 weeks) (IN progress)  1. Decrease cervical girth by 2 cm.  2. Patient will perform self lymph drainage techniques independently.  3. Patient to tomas/doff compression garment.   4. Patient demonstrates increased B scapular stabilization to at least 4+/5   5. Patient demonstrates increased mandibular depression to 50 mm   6. Patient demonstrates increased strength BUE's to 4+/5 or greater to improve tolerance to functional activities pain free.   7. Patient to report improvement in overall endurance, participating in 30 minute walking program 3-5x week.     Updated Goals: (4 Weeks) 04/17/2023-05/17/2023 IN PROGRESS  Improved R mid trap/lower trap strength to 4+/5 or greater.  Decrease R shoulder pain to 2-3/10 or less with AROM  Patient to report improvement in overall endurance, participating in 30 minute walking program 3-5x week.       Plan     Recommend 2x week/4 weeks 04/17/2023-05/17/2023     Rochelle Leslie, NATANAEL , CLT

## 2023-05-01 ENCOUNTER — CLINICAL SUPPORT (OUTPATIENT)
Dept: REHABILITATION | Facility: HOSPITAL | Age: 68
End: 2023-05-01
Payer: MEDICARE

## 2023-05-01 DIAGNOSIS — C80.1 SECONDARY SQUAMOUS CELL CARCINOMA OF HEAD AND NECK WITH UNKNOWN PRIMARY SITE: ICD-10-CM

## 2023-05-01 DIAGNOSIS — C79.89 SECONDARY SQUAMOUS CELL CARCINOMA OF HEAD AND NECK WITH UNKNOWN PRIMARY SITE: ICD-10-CM

## 2023-05-01 DIAGNOSIS — M25.511 PAIN IN JOINT OF RIGHT SHOULDER: Primary | ICD-10-CM

## 2023-05-01 PROCEDURE — 97110 THERAPEUTIC EXERCISES: CPT | Mod: PN

## 2023-05-01 PROCEDURE — 97140 MANUAL THERAPY 1/> REGIONS: CPT | Mod: PN

## 2023-05-01 NOTE — PROGRESS NOTES
"[facility]  Physical Therapy Progress Note  Lymphedema Therapy    Visit Date: 5/1/2023    Name: Seven Freire  MRN: [id number]  Therapy Diagnosis:   Encounter Diagnoses   Name Primary?    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 The front of the right shoulder is sore and side of my neck feels tight today."    Pt admits that he hasn't been doing self MLD; have the sheet that explains how to do it.     Pain: 4/10  Location: cervical spine/right shoulder/ lower back     Objective   Observation: rounded shoulders with anterior position of R humeral head, mild right scapular winging.     TREATMENT  O2 sat 94% and HR 66 bpm at rest     Therapeutic Exercise to develop strength, ROM, flexibility, and posture for 50 minutes including:  Nu step L3  x 10 min (671 steps) with post HR 95 and O2 95%  6 minutes of instruction Deep Diaphragm breathing with verbal and tactile cue instruction for proper technique (1 hand on chest/1 hand on abdomen) (continues to require practice)  UBE L2 x 10' (alt direction ea minute) 78 bpm, O2 92% (verbal instruction given for proper diaphragm breathing) O2 sats recovered to 96%  Wall slides with towel R shoulder flexion 5 sec hold x 10 reps  Wall slides with towel right shoulder abduction x 10    Standing:   Scap retractions with GTB 2 x 10  Straight arm " "pulldowns with RTB 2 x 10   Tricep ext with red tband 2x 15 B UE  Bilateral ER with RTB 3 x 10    Supine:  Supine serratus presses B UE AROM for proper technique required 20 reps  Side lying scapula retraction/depression x 10 reps with 10 sec hold    Manual therapy: 8 minutes of one on one time spent for Patient to receive Kinesiology taping to the R shoulder for postural correction with 1 "I" strip along posterior scapula while retracted, 2 strips anchored at anterior GH joint coming over top of shoulder.  Patient was instructed on proper care, wear time, and removal with encouragement to maintain application of tape for 48-72 hours unless adverse effects arise.     Deferred  Manual stretching of right shoulder, with tightness at end range flexion/abduction  Manual Therapy to develop flexibility, extensibility, pliability, and contour for 15 minutes including:   Manual lymphatic drainage to bilateral short neck series, cervical terminus , axilla, sternal nodes, paravertebral nodes, submental nodes, pre- and retro auricular nodes with facial sequence,  AIA anastomosis , RUTH anastomosis , and rework accessing all watershed areas on trunk. Discussed that pt can do self MLD in the shower.     Deferred:  Lymphatouch applied to cervical terminus, lateral cervical triangle, submental region bilaterally, anterior neck set at 55mmHg for 15 minutes.  Rock tape applied to anchored at superior border of scapula extending to lateral and anterior neck/submental region, second strip anchored at right cervical terminus extending in weave pattern to anterior neck  to improve lymphangiomotorcity 10 min Pt instructed to remove within 72 hours or sooner if it begins to irritate skin.   Previously Reviewed with pt self MLD hand placements to cervical terminus, B axilla, lateral neck B  and from neck to chest (handout also provided again for review)  Deferred due to time:  8 reps sit to stand test 30 sec   Trunk rotation 3 plane center, " feet to left fet to right 10 reps each  Post pelvic tilts 10 sec hold 10 reps  Glut sets 10 reps in supine  Diaphragmatic breathing  Trunk flexion with ball roll outs in sitting x 10   Stat bike 12 min L2   bpm, O2 96 %  Supine Chin tucks  Grimacing ex in supine and sitting 10 reps each  In supine Cervical rotations R/L  Seated shoulder rolls x 10      EDUCATION  Education provided:   - Progress towards goals   - Role of therapy   - Activity modification  - Lymphedema information (definitions, signs, symptoms, precautions), purpose of lymphedema physical therapy and the benefits/risks of treatment    Written Home Exercises Provided:  Provided pt with additional handout of self MLD techniques, reviewed with pt steps 1-3, will continue with remaining steps during following visits. .  Exercises were reviewed and Seven was able to demonstrate them prior to the end of the session.  Seven demonstrated good  understanding of the education provided.   See EMR under Patient Instructions for exercises provided 3/2/2023.    Assessment   Improved O2 stats with exercise today, maintained 94% and above. Continued practice needed for deep diaphragm breathing. Pt overall improved R UE shoulder ROM, however presents with R winging scapula, mid trap/lower trap weakness, anterior position of right humeral head and pain to anterior right shoulder, with forward elevation after repetitive motion. Continued endurance exercise, postural strengthening, serratus anterior strengthening today. Trialed rock tape today for postural cueing due to anterior position of R GH head at rest.   Pt would benefit from scapular and glenohumeral stabilization, postural exercises in order to improve functional mobility with less pain. Pt reports continued poor endurance at home and self motivation to exercise.    PT to confirm with pt compliance and proficiency of self MLD and benefit of performing self MLD at home, utilizing compression pump 1-2x day  with importance of maintaining consistancy with home program, wearing night time compression every night for best results.      Seven Is progressing well towards his goals. Pt will continue to benefit from skilled outpatient physical therapy to address the deficits listed in the problem list box on initial evaluation, provide pt/family education and to maximize pt's level of independence in the home and community environment.   Pt prognosis is Good.     Patient reports having difficulty with eating and has lost some weight. Dr. Sellers wanted me to return to PT. Pt has decreased mandibular depression limiting opening of mouth, scapular stabilization weakness more noted on R as compared to L and fullness with decreased tissue pliability in submental/ anterior neck region.Poor level of endurance which could be improved.   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 Patient completed  chemotherapy 1x a week and 30 tx of radiation at [facility] in [place]. Pt primary complaint  Patient presents with lymphedema of the head/neck with fullness to submandibular region and anterior/lateral neck, increased pain, increased stiffness in the right shoulder and cervical range of motion, as well as difficulty performing overhead reaching with flexion and abduction ranges and placing the pt at rist of higher infection.    Plan of care discussed with patient: Yes  Pt's spiritual, cultural and educational needs considered and patient is agreeable to the plan of care and goals.    Anticipated Barriers for therapy: none    GOALS  Short Term Goals: (3 weeks) 03/15/2023  1. Decrease cervical girth by 1 cm (Not met, girth measures increased in 2 of 3 landmarks)  2. Patient will demonstrate 100% knowledge of lymphedema precautions and signs of infection.   3. Patient  demonstrates independence with Postural Awareness.   4. Patient will perform self lymph drainage techniques. (Non-compliant)  5. Patient demonstrates independence with HEP.         Long Term Goals: (6 weeks) (IN progress)  1. Decrease cervical girth by 2 cm.  2. Patient will perform self lymph drainage techniques independently.  3. Patient to tomas/doff compression garment.   4. Patient demonstrates increased B scapular stabilization to at least 4+/5   5. Patient demonstrates increased mandibular depression to 50 mm   6. Patient demonstrates increased strength BUE's to 4+/5 or greater to improve tolerance to functional activities pain free.   7. Patient to report improvement in overall endurance, participating in 30 minute walking program 3-5x week.     Updated Goals: (4 Weeks) 04/17/2023-05/17/2023 IN PROGRESS  Improved R mid trap/lower trap strength to 4+/5 or greater.  Decrease R shoulder pain to 2-3/10 or less with AROM  Patient to report improvement in overall endurance, participating in 30 minute walking program 3-5x week.       Plan     Recommend 2x week/4 weeks 04/17/2023-05/17/2023     Sheryl Osorio PT , CLT

## 2023-05-03 ENCOUNTER — CLINICAL SUPPORT (OUTPATIENT)
Dept: REHABILITATION | Facility: HOSPITAL | Age: 68
End: 2023-05-03
Payer: MEDICARE

## 2023-05-03 DIAGNOSIS — M25.511 PAIN IN JOINT OF RIGHT SHOULDER: Primary | ICD-10-CM

## 2023-05-03 DIAGNOSIS — M25.9 SHOULDER JOINT DYSFUNCTION: ICD-10-CM

## 2023-05-03 DIAGNOSIS — I89.0 LYMPHEDEMA: ICD-10-CM

## 2023-05-03 PROCEDURE — 97110 THERAPEUTIC EXERCISES: CPT | Mod: PN

## 2023-05-03 NOTE — PROGRESS NOTES
[facility]  Physical Therapy Progress Note  Lymphedema Therapy    Visit Date: 5/3/2023    Name: Seven Freire  MRN: [id number]  Therapy Diagnosis:   Encounter Diagnoses   Name Primary?    Pain in joint of right shoulder Yes    Lymphedema     Shoulder joint dysfunction        Physician: Juanita Sellers MD    Evaluation Date: 2/23/23  Reassess: 03/15/2023  Re-assess: 04/17/2023  Visit #/Visits authorized: pending   Authorization Dates: pending  Plan of Care Expiration: 10 visits/ 30days  MD order: PT Eval and Treat       Visit: 14 / 18  PTA Visit: 0/ 5  Time In: 01:00 PM  Time Out: 1:50 PM  Total Billable Time: 50 minutes    Precautions: Standard, cancer , lymphedema    Subjective     Pt reports:  actually did some yard work, so my R shoulder is a little sore, I started my walking program yesterday.    Prior session: Pt admits that he hasn't been doing self MLD; have the sheet that explains how to do it.     Pain: 4/10  Location: cervical spine/right shoulder/ lower back     Objective   Observation: rounded shoulders with anterior position of R humeral head, mild right scapular winging. Submental lymphedema present    TREATMENT  HR 86 and O2 94% at rest     Therapeutic Exercise to develop strength, ROM, flexibility, and posture for 50 minutes including:  Nu step L3  x 10 min (671 steps) with post HR O2 791 steps   UBE L2 x 10' (alt direction ea minute) 100 bpm, O2 98%   Scap retractions with GTB 3 x 10  Straight arm pulldowns with RTB 2 x 10   Tricep ext with red tband 2x 15 B UE  Bilateral ER with RTB 3 x 10  Sit to stand with ball press and extended hands. 10 reps  deferred  Wall slides with towel R shoulder flexion 5 sec hold x 10 reps  Wall slides with towel right shoulder abduction x 10  Supine:  Supine serratus presses B UE AROM for proper technique required 20 reps  Side lying scapula retraction/depression x 10 reps with 10 sec hold  Deferred:  Manual therapy: 8 minutes of one on  "one time spent for Patient to receive Kinesiology taping to the R shoulder for postural correction with 1 "I" strip along posterior scapula while retracted, 2 strips anchored at anterior GH joint coming over top of shoulder.  Patient was instructed on proper care, wear time, and removal with encouragement to maintain application of tape for 48-72 hours unless adverse effects arise.     Deferred  Manual stretching of right shoulder, with tightness at end range flexion/abduction  Manual Therapy to develop flexibility, extensibility, pliability, and contour for 15 minutes including:   Manual lymphatic drainage to bilateral short neck series, cervical terminus , axilla, sternal nodes, paravertebral nodes, submental nodes, pre- and retro auricular nodes with facial sequence,  AIA anastomosis , RUTH anastomosis , and rework accessing all watershed areas on trunk. Discussed that pt can do self MLD in the shower.     Deferred:  Lymphatouch applied to cervical terminus, lateral cervical triangle, submental region bilaterally, anterior neck set at 55mmHg for 15 minutes.  Rock tape applied to anchored at superior border of scapula extending to lateral and anterior neck/submental region, second strip anchored at right cervical terminus extending in weave pattern to anterior neck  to improve lymphangiomotorcity 10 min Pt instructed to remove within 72 hours or sooner if it begins to irritate skin.   Previously Reviewed with pt self MLD hand placements to cervical terminus, B axilla, lateral neck B  and from neck to chest (handout also provided again for review)  Deferred due to time:  8 reps sit to stand test 30 sec   Trunk rotation 3 plane center, feet to left fet to right 10 reps each  Post pelvic tilts 10 sec hold 10 reps  Glut sets 10 reps in supine  Diaphragmatic breathing  Trunk flexion with ball roll outs in sitting x 10   Stat bike 12 min L2   bpm, O2 96 %  Supine Chin tucks  Grimacing ex in supine and sitting 10 " reps each  In supine Cervical rotations R/L  Seated shoulder rolls x 10      EDUCATION  Education provided:   - Progress towards goals   - Role of therapy   - Activity modification  - Lymphedema information (definitions, signs, symptoms, precautions), purpose of lymphedema physical therapy and the benefits/risks of treatment    Written Home Exercises Provided:  Provided pt with additional handout of self MLD techniques, reviewed with pt steps 1-3, will continue with remaining steps during following visits. .  Exercises were reviewed and Seven was able to demonstrate them prior to the end of the session.  Seven demonstrated good  understanding of the education provided.   See EMR under Patient Instructions for exercises provided 3/2/2023.    Assessment   Improved O2 stats with exercise today.  Pt overall improved R UE shoulder ROM, however presents with R winging scapula, mid trap/lower trap weakness, anterior position of right humeral head and pain to anterior right shoulder, with forward elevation after repetitive motion. Continued endurance exercise, postural strengthening, serratus anterior strengthening today. Pt would benefit from scapular and glenohumeral stabilization, postural exercises in order to improve functional mobility with less pain. Pt reports continued poor endurance at home and improving self motivation to exercise.    PT to confirm with pt compliance and proficiency of self MLD and benefit of performing self MLD at home, utilizing compression pump 1-2x day with importance of maintaining consistancy with home program, wearing night time compression every night for best results.      Seven Is progressing well towards his goals. Pt will continue to benefit from skilled outpatient physical therapy to address the deficits listed in the problem list box on initial evaluation, provide pt/family education and to maximize pt's level of independence in the home and community environment.   Pt prognosis is  Good.     Patient reports having difficulty with eating and has lost some weight. Dr. Sellers wanted me to return to PT. Pt has decreased mandibular depression limiting opening of mouth, scapular stabilization weakness more noted on R as compared to L and fullness with decreased tissue pliability in submental/ anterior neck region.Poor level of endurance which could be improved.   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 Patient completed  chemotherapy 1x a week and 30 tx of radiation at [facility] in [place]. Pt primary complaint  Patient presents with lymphedema of the head/neck with fullness to submandibular region and anterior/lateral neck, increased pain, increased stiffness in the right shoulder and cervical range of motion, as well as difficulty performing overhead reaching with flexion and abduction ranges and placing the pt at rist of higher infection.    Plan of care discussed with patient: Yes  Pt's spiritual, cultural and educational needs considered and patient is agreeable to the plan of care and goals.    Anticipated Barriers for therapy: none    GOALS  Short Term Goals: (3 weeks) 03/15/2023  1. Decrease cervical girth by 1 cm (Not met, girth measures increased in 2 of 3 landmarks)  2. Patient will demonstrate 100% knowledge of lymphedema precautions and signs of infection.   3. Patient demonstrates independence with Postural Awareness.   4. Patient will perform self lymph drainage techniques. (Non-compliant)  5. Patient demonstrates independence with HEP.         Long Term Goals: (6 weeks) (IN progress)  1. Decrease cervical girth by 2 cm.  2. Patient will perform self lymph drainage techniques independently.  3. Patient to tomas/doff compression garment.   4. Patient demonstrates increased B scapular stabilization to at least 4+/5   5.  Patient demonstrates increased mandibular depression to 50 mm   6. Patient demonstrates increased strength BUE's to 4+/5 or greater to improve tolerance to functional activities pain free.   7. Patient to report improvement in overall endurance, participating in 30 minute walking program 3-5x week.     Updated Goals: (4 Weeks) 04/17/2023-05/17/2023 IN PROGRESS  Improved R mid trap/lower trap strength to 4+/5 or greater.  Decrease R shoulder pain to 2-3/10 or less with AROM  Patient to report improvement in overall endurance, participating in 30 minute walking program 3-5x week.       Plan     Recommend 2x week/4 weeks 04/17/2023-05/17/2023     Laurence Bahena, PT , CLT

## 2023-05-08 ENCOUNTER — CLINICAL SUPPORT (OUTPATIENT)
Dept: REHABILITATION | Facility: HOSPITAL | Age: 68
End: 2023-05-08
Payer: MEDICARE

## 2023-05-08 DIAGNOSIS — I89.0 LYMPHEDEMA: ICD-10-CM

## 2023-05-08 DIAGNOSIS — M25.9 SHOULDER JOINT DYSFUNCTION: ICD-10-CM

## 2023-05-08 DIAGNOSIS — M25.511 PAIN IN JOINT OF RIGHT SHOULDER: Primary | ICD-10-CM

## 2023-05-08 PROCEDURE — 97140 MANUAL THERAPY 1/> REGIONS: CPT | Mod: PN

## 2023-05-08 PROCEDURE — 97110 THERAPEUTIC EXERCISES: CPT | Mod: PN

## 2023-05-08 NOTE — PROGRESS NOTES
"[facility]  Physical Therapy Progress Note  Lymphedema Therapy    Visit Date: 5/8/2023    Name: Seven Freire  MRN: [id number]  Therapy Diagnosis:   No diagnosis found.      Physician: Juanita Sellers MD    Evaluation Date: 2/23/23  Reassess: 03/15/2023  Re-assess: 04/17/2023  Visit #/Visits authorized: pending   Authorization Dates: pending  Plan of Care Expiration: 10 visits/ 30days  MD order: PT Eval and Treat       Visit: 15 / 18  PTA Visit: 0/ 5  Time In: 01:00 PM  Time Out: 1:50 PM  Total Billable Time: 50 minutes    Precautions: Standard, cancer , lymphedema    Subjective     Pt reports:  "Not having a good day today, was feeling real sick over the weekend (sick to my stomach, right chest/pec region soreness, appetite went down) and with the bad weather over the weekend had my neck and low back hurting. I couldn't get outside at all, so sat watching tv, not much activity this weekend. " "Going tomorrow to get the shots in my neck and low back tomorrow." "Didn't use my pump over the weekend, that's my fault. But I use that compression garment every night. Surprised how much better the swelling is in the morning when I wear it."    Prior session: Pt admits that he hasn't been doing self MLD; have the sheet that explains how to do it.     Pain: 9/10  Location: cervical spine/right shoulder/ lower back     Objective   Observation: rounded shoulders with anterior position of R humeral head, mild right scapular winging. Submental lymphedema present    TREATMENT  HR 86 and O2 94% at rest     Therapeutic Exercise to develop strength, ROM, flexibility, and posture for 30 minutes including:  Nu step L3  x 10 min with post  O2 sats 95%  UBE L1.5 x 10' (alt direction ea minute) 108 bpm, O2 98%   Supine chin tucks, cervical rotations to tolerance  Seated shoulder rolls    Manual Therapy to develop flexibility, extensibility, pliability, and contour for 30 minutes including:   STM " "cervical extensors, bilateral upper traps, SCM  Manual lymphatic drainage to bilateral short neck series, cervical terminus , axilla, sternal nodes, paravertebral nodes, submental nodes, pre- and retro auricular nodes with facial sequence,  AIA anastomosis , RUTH anastomosis , and rework accessing all watershed areas on trunk. Lymphatouch utilized to lateral neck dissection scarring, cervical terminus 60mmHg for 8 min.  Manual stretching of right shoulder, with tightness at end range flexion/abduction (pt reporting anterior shoulder discomfort after)  Pt reporting some relief in pain overall after treatment.     Deferred:  Scap retractions with GTB 3 x 10  Straight arm pulldowns with RTB 2 x 10   Tricep ext with red tband 2x 15 B UE  Bilateral ER with RTB 3 x 10  Sit to stand with ball press and extended hands. 10 reps  deferred  Wall slides with towel R shoulder flexion 5 sec hold x 10 reps  Wall slides with towel right shoulder abduction x 10  Supine:  Supine serratus presses B UE AROM for proper technique required 20 reps  Side lying scapula retraction/depression x 10 reps with 10 sec hold      Deferred:  Manual therapy: 8 minutes of one on one time spent for Patient to receive Kinesiology taping to the R shoulder for postural correction with 1 "I" strip along posterior scapula while retracted, 2 strips anchored at anterior GH joint coming over top of shoulder.  Patient was instructed on proper care, wear time, and removal with encouragement to maintain application of tape for 48-72 hours unless adverse effects arise.       Deferred:  Lymphatouch applied to cervical terminus, lateral cervical triangle, submental region bilaterally, anterior neck set at 55mmHg for 15 minutes.  Rock tape applied to anchored at superior border of scapula extending to lateral and anterior neck/submental region, second strip anchored at right cervical terminus extending in weave pattern to anterior neck  to improve lymphangiomotorcity " 10 min Pt instructed to remove within 72 hours or sooner if it begins to irritate skin.   Previously Reviewed with pt self MLD hand placements to cervical terminus, B axilla, lateral neck B  and from neck to chest (handout also provided again for review)  Deferred due to time:  8 reps sit to stand test 30 sec   Trunk rotation 3 plane center, feet to left fet to right 10 reps each  Post pelvic tilts 10 sec hold 10 reps  Glut sets 10 reps in supine  Diaphragmatic breathing  Trunk flexion with ball roll outs in sitting x 10   Stat bike 12 min L2   bpm, O2 96 %  Supine Chin tucks  Grimacing ex in supine and sitting 10 reps each  In supine Cervical rotations R/L  Seated shoulder rolls x 10      EDUCATION  Education provided:   - Progress towards goals   - Role of therapy   - Activity modification  - Lymphedema information (definitions, signs, symptoms, precautions), purpose of lymphedema physical therapy and the benefits/risks of treatment    Written Home Exercises Provided:  Provided pt with additional handout of self MLD techniques, reviewed with pt steps 1-3, will continue with remaining steps during following visits. .  Exercises were reviewed and Seven was able to demonstrate them prior to the end of the session.  Seven demonstrated good  understanding of the education provided.   See EMR under Patient Instructions for exercises provided 3/2/2023.    Assessment   Patient with increased neck and low back pain today, limiting overall tolerance with exercise. Pt reports receives regular injections to his neck and low back which gives him relief for couple months. Some decrease in pain following STM, manual stretching and lymphatic drainage.     Pt overall improved R UE shoulder ROM, however presents with R winging scapula, mid trap/lower trap weakness, anterior position of right humeral head and pain to anterior right shoulder, with forward elevation after repetitive motion. Continued endurance exercise, postural  strengthening, serratus anterior strengthening today. Pt would benefit from scapular and glenohumeral stabilization, postural exercises in order to improve functional mobility with less pain. Pt reports continued poor endurance at home and improving self motivation to exercise.    PT to confirm with pt compliance and proficiency of self MLD and benefit of performing self MLD at home, utilizing compression pump 1-2x day with importance of maintaining consistancy with home program, wearing night time compression every night for best results.      Seven Is progressing well towards his goals. Pt will continue to benefit from skilled outpatient physical therapy to address the deficits listed in the problem list box on initial evaluation, provide pt/family education and to maximize pt's level of independence in the home and community environment.   Pt prognosis is Good.     Patient reports having difficulty with eating and has lost some weight. Dr. Sellers wanted me to return to PT. Pt has decreased mandibular depression limiting opening of mouth, scapular stabilization weakness more noted on R as compared to L and fullness with decreased tissue pliability in submental/ anterior neck region.Poor level of endurance which could be improved.   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 Patient completed  chemotherapy 1x a week and 30 tx of radiation at [facility] in [place]. Pt primary complaint  Patient presents with lymphedema of the head/neck with fullness to submandibular region and anterior/lateral neck, increased pain, increased stiffness in the right shoulder and cervical range of motion, as well as difficulty performing overhead reaching with flexion and abduction ranges and placing the pt at rist of higher infection.    Plan of care discussed with  patient: Yes  Pt's spiritual, cultural and educational needs considered and patient is agreeable to the plan of care and goals.    Anticipated Barriers for therapy: none    GOALS  Short Term Goals: (3 weeks) 03/15/2023  1. Decrease cervical girth by 1 cm (Not met, girth measures increased in 2 of 3 landmarks)  2. Patient will demonstrate 100% knowledge of lymphedema precautions and signs of infection.   3. Patient demonstrates independence with Postural Awareness.   4. Patient will perform self lymph drainage techniques. (Non-compliant)  5. Patient demonstrates independence with HEP.         Long Term Goals: (6 weeks) (IN progress)  1. Decrease cervical girth by 2 cm.  2. Patient will perform self lymph drainage techniques independently.  3. Patient to tomas/doff compression garment.   4. Patient demonstrates increased B scapular stabilization to at least 4+/5   5. Patient demonstrates increased mandibular depression to 50 mm   6. Patient demonstrates increased strength BUE's to 4+/5 or greater to improve tolerance to functional activities pain free.   7. Patient to report improvement in overall endurance, participating in 30 minute walking program 3-5x week.     Updated Goals: (4 Weeks) 04/17/2023-05/17/2023 IN PROGRESS  Improved R mid trap/lower trap strength to 4+/5 or greater.  Decrease R shoulder pain to 2-3/10 or less with AROM  Patient to report improvement in overall endurance, participating in 30 minute walking program 3-5x week.       Plan     Recommend 2x week/4 weeks 04/17/2023-05/17/2023     Sheryl Osorio PT , CLT

## 2023-05-10 ENCOUNTER — CLINICAL SUPPORT (OUTPATIENT)
Dept: REHABILITATION | Facility: HOSPITAL | Age: 68
End: 2023-05-10
Payer: MEDICARE

## 2023-05-10 DIAGNOSIS — M25.9 SHOULDER JOINT DYSFUNCTION: ICD-10-CM

## 2023-05-10 DIAGNOSIS — I89.0 LYMPHEDEMA: Primary | ICD-10-CM

## 2023-05-10 NOTE — PROGRESS NOTES
"[facility]  Physical Therapy Discharge Summary & Progress Note  Lymphedema Therapy    Visit Date: 5/10/2023    Name: Seven Freire  MRN: [id number]  Therapy Diagnosis:   Encounter Diagnoses   Name Primary?    Lymphedema Yes    Shoulder joint dysfunction          Physician: Juanita Sellers MD    Evaluation Date: 2/23/23  Reassess: 03/15/2023  Re-assess: 04/17/2023  Discharge/ Re-assess 5/10/23  Visit #/Visits authorized: pending   Authorization Dates: pending  Plan of Care Expiration: 10 visits/ 30days  MD order: PT Eval and Treat       Visit: 16 / 18  PTA Visit: 0/ 5  Time In: 01:00 PM  Time Out: 2 PM  Total Billable Time: 60 minutes    Precautions: Standard, cancer , lymphedema    Subjective     Pt reports:  "feeling better today compared to last session, neck and shoulder pain present 4/10. "Didn't use my pump for last 7 days, but that is my fault. I have been walking with my wife. But I use that compression garment every night. Surprised how much better the swelling is in the morning when I wear it." "I know this is my last visit, gonna miss coming"     Prior session: Pt admits that he hasn't been doing self MLD; have the sheet that explains how to do it.     Pain: 4/10  Location: cervical spine/right shoulder/ lower back     Objective   Observation: rounded shoulders with anterior position of R humeral head, mild right scapular winging. Mild submental lymphedema present    TREATMENT  HR 83 and O2 98% at rest   Reassessment:.6MWT 1224 feet with HR 106bpm, O2 96%   5/10/2023     Evaluation:  02/23/2023 6MWT .1594 feet O2 sat 93% and HR 99 bpm after ambulation.   DC 6MWT: 1224 feet with HR 106bpm, O2 96%   Shoulder Range of Motion:   ACTIVE ROM LEFT EVAL 4/26/2022 RIGHT EVAL 4/26/2022 LEFT Re-Assess  6/17/2022 RIGHT Re- Assess  06/17/2022 LEFT EVAL 2/23/2023 RIGHT EVAL 2/23/2023 LEFT  Reasses  03/15/2023 RIGHT   Reassess  03/15/2023 LEFT  Reassess  04/17/2023 RIGHT "   Reassess  04/17/2023 LEFT Reassess RIGHT   Reassess   Flexion 170 155 164 150 160 deg 160 deg 165 165 170 165 180 180   Abduction 170 145 (P) 173 140 with paid during movement and  deg 170 deg 170 170 170 170 180 180   Extension WNL WNL WNL WNL             WNL WNL   IR WNL WNL WNL WNL WFL WFL         WNL WNL   ER WNL WNL 55 55 73 72 73 73 73 73 WNL WNL      STRENGTH LEFT  EVAL 4/26/2022 RIGHT EVAL 4/26/2022 LEFT Re-Assess  6/17/2022 RIGHT Re- Assess  06/17/2022 LEFT EVAL 2/23/2023 RIGHT EVAL 2/23/2023 LEFT Reassess  03/15/2023 RIGHT   Reassess  03/15/2023 LEFT Reassess  04/17/2023 RIGHT Reassess 04/17/2023 LEFT Reassess RIGHT   Reassess   Flexion 5/5 4/5 5/5 4/5 5/5 5/5 5/5 5/5 5/5 5/5 5/5 5/5   Abduction 5/5 4-/5 5/5 4-/5 5/5 4/5 5/5 4+/5 5/5 4+/5 5/5 5/5   Extension 5/5 4/5 5/5 4/5 5/5 4+/5 5/5 4+/5 5/5 4+/5 5/5 5/5   IR (neutral) 4+/5 4/5 5/5 4/5 5/5 5/5 5/5 5/5 5/5 5/5 5/5 5/5   ER (neutral) 4+/5 4/5 4+/5 4/5 5/5 4+/5 5/5 4+/5 5/5 5/5 5/5 5/5   Middle Trap 4+/5 4/5 4+/5 4/5 4+/5 4-/5 4+/5 4/4 4+/5 4/5 5/5 5/5   Lower Trap 4+/5 4/5 4/5 4-/5 4/5 3+/5 4+/5 4/5 4+/5 4/5 4+/5 4+/5    No pain R UE with MMT     CERVICAL SPINE AROM:     eval 4/26/2022  Re-assess 6/17/2022 EVAL 2/23/23 Reassess  03/15/2023 Reassess  04/17/2023 Re-assess 5/10/2023   Flexion: WNL  WNL WNL WNL WNL WNL   Extension: 20 deg 20 deg 35 deg 40 40 40   Left Sidebend: 18 deg 16 deg 15 deg 15 25 16   Right Sidebend: 20 deg 18 deg 10 deg 15 20 27   Left Rotation: 35 deg 38 deg 50 deg 50 55 48   Right Rotation: 40 deg 55 deg 56 deg 50 50 62   Mandibular depression   50 cm 35cm 40 mm 44 mm 50   I     Girth Measurements (in centimeters)  [facility] Eval 4/26/2022 Re-assess 6/17/2022 EVAL 2/23/2023 Reassess  03/15/2023 Reassess  04/17/2023 Re-assess 5/10/2023   2 in below earlobe 52.0 cm 49.0 cm 46.2 49.0 47.5 46.7   3 in below earlobe  50.0 cm 47.7 cm 44.2 47.4 45.8 42.5    4 in below earlobe 49.5 cm 45.0 cm 45.1 43.8 44.0 42.3   Improved  reduction to 3 of 3 landmarks, absent firmness throughout submental region with fullness to right submandibular region.     Therapeutic Exercise to develop strength, ROM, flexibility, and posture for  25 minutes includinMWT 1224 feet with HR 106bpm, O2 96%   Nu step L3  x 10 min deferred  UBE L2 x 6' (alt direction ea minute) 108 bpm, O2 98%   Supine chin tucks, cervical rotations to tolerance  Seated shoulder rolls  Review of postural exercies  Provided red theraband for home use, pt has written hep for band work  Scap retractions with GTB 3 x 10  Straight arm pulldowns with RTB 2 x 10   Tricep ext with red tband 2x 15 B UE  Bilateral ER with RTB 3 x 10    Manual Therapy to develop flexibility, extensibility, pliability, and contour for 30 minutes including:   STM cervical extensors, bilateral upper traps, SCM  Manual lymphatic drainage to bilateral short neck series, cervical terminus , axilla, sternal nodes, paravertebral nodes, submental nodes, pre- and retro auricular nodes with facial sequence,  AIA anastomosis , RUTH anastomosis , and rework accessing all watershed areas on trunk. Lymphatouch utilized to lateral neck dissection scarring, cervical terminus 60mmHg for 8 min.  Pt reporting some relief in pain overall after treatment.     EDUCATION  Education provided:   - Progress towards goals   - Role of therapy   - Activity modification  - Lymphedema information (definitions, signs, symptoms, precautions), purpose of lymphedema physical therapy and the benefits/risks of treatment    Written Home Exercises Provided: Has received exercises and self mld handouts in past sessions.   Exercises were reviewed and Seven was able to demonstrate them prior to the end of the session.  Seven demonstrated good  understanding of the education provided.   See EMR under Patient Instructions for exercises provided 3/2/2023.    Assessment   Significant gains in arom of B shoulders and strength gains as measured with MMT.  Pt has MLD handout and has been provided with instruction and reports he knows how to do it. Pt compliant for most part wearing compression garment at night. Has not been compliant with pump this last week, however reports now getting off couch and walking with his wife daily. Some decrease in pain following STM, manual stretching and lymphatic drainage.     Pt overall improved R UE shoulder ROM,  improved mid trap/lower trap weakness, anterior position of right humeral head and pain to anterior right shoulder, with forward elevation after repetitive motion with posture deviation.  Seven Is progressing well towards his goals. Pt will continue to benefit from skilled outpatient physical therapy to address the deficits listed in the problem list box on initial evaluation, provide pt/family education and to maximize pt's level of independence in the home and community environment.       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 Patient completed  chemotherapy 1x a week and 30 tx of radiation at [facility] in [place]. Pt primary complaint  Patient presents with lymphedema of the head/neck with fullness to submandibular region and anterior/lateral neck, increased pain, increased stiffness in the right shoulder and cervical range of motion, as well as difficulty performing overhead reaching with flexion and abduction ranges and placing the pt at rist of higher infection.      GOALS  Short Term Goals: (3 weeks) 03/15/2023  1. Decrease cervical girth by 1 cm (Not met, girth measures increased in 2 of 3 landmarks)  2. Patient will demonstrate 100% knowledge of lymphedema precautions and signs of infection.   3. Patient demonstrates independence with Postural Awareness.   4. Patient will perform self lymph drainage techniques.  (Non-compliant)  5. Patient demonstrates independence with HEP.         Long Term Goals: (6 weeks)  1. Decrease cervical girth by 2 cm. GOAL MET 5/10/23  2. Patient will perform self lymph drainage techniques independently.GOAL MET 5/10/23  3. Patient to tomas/doff compression garment. GOAL MET 5/10/23  4. Patient demonstrates increased B scapular stabilization to at least 4+/5 Partially met 5/10/2023   5. Patient demonstrates increased mandibular depression to 50 mm GOAL MET 5/10/23  6. Patient demonstrates increased strength BUE's to 4+/5 or greater to improve tolerance to functional activities pain free. GOAL MET 5/10/23  7. Patient to report improvement in overall endurance, participating in 30 minute walking program 3-5x week. GOAL MET 5/10/23    Updated Goals: (4 Weeks) 04/17/2023-05/17/2023   Improved R mid trap/lower trap strength to 4+/5 or greater.GOAL MET 5/10/2023  Decrease R shoulder pain to 2-3/10 or less with AROM Partially met 5/10/2023  Patient to report improvement in overall endurance, participating in 30 minute walking program 3-5x week.  GOAL MET 5/10/23      Plan   Discharge patient at this time to home program, continued use of compression at night, use of pump and daily walking program with resistive band work.     Laurence Bahena, PT , CLT

## 2023-06-06 ENCOUNTER — PATIENT MESSAGE (OUTPATIENT)
Dept: DERMATOLOGY | Facility: CLINIC | Age: 68
End: 2023-06-06
Payer: MEDICARE

## 2023-06-12 ENCOUNTER — OFFICE VISIT (OUTPATIENT)
Dept: HEMATOLOGY/ONCOLOGY | Facility: CLINIC | Age: 68
End: 2023-06-12
Payer: MEDICARE

## 2023-06-12 VITALS
SYSTOLIC BLOOD PRESSURE: 147 MMHG | BODY MASS INDEX: 21.83 KG/M2 | RESPIRATION RATE: 18 BRPM | TEMPERATURE: 97 F | HEART RATE: 82 BPM | HEIGHT: 72 IN | WEIGHT: 161.19 LBS | DIASTOLIC BLOOD PRESSURE: 88 MMHG | OXYGEN SATURATION: 96 %

## 2023-06-12 DIAGNOSIS — N40.0 ENLARGED PROSTATE: ICD-10-CM

## 2023-06-12 DIAGNOSIS — C80.1 SECONDARY SQUAMOUS CELL CARCINOMA OF HEAD AND NECK WITH UNKNOWN PRIMARY SITE: ICD-10-CM

## 2023-06-12 DIAGNOSIS — I89.0 LYMPHEDEMA: Primary | ICD-10-CM

## 2023-06-12 DIAGNOSIS — B37.9 CANDIDIASIS: ICD-10-CM

## 2023-06-12 DIAGNOSIS — E03.9 ACQUIRED HYPOTHYROIDISM: ICD-10-CM

## 2023-06-12 DIAGNOSIS — M25.9 SHOULDER JOINT DYSFUNCTION: ICD-10-CM

## 2023-06-12 DIAGNOSIS — C79.89 SECONDARY SQUAMOUS CELL CARCINOMA OF HEAD AND NECK WITH UNKNOWN PRIMARY SITE: ICD-10-CM

## 2023-06-12 PROCEDURE — 1101F PT FALLS ASSESS-DOCD LE1/YR: CPT | Mod: CPTII,S$GLB,, | Performed by: OTOLARYNGOLOGY

## 2023-06-12 PROCEDURE — 99999 PR PBB SHADOW E&M-EST. PATIENT-LVL V: CPT | Mod: PBBFAC,,, | Performed by: OTOLARYNGOLOGY

## 2023-06-12 PROCEDURE — 99999 PR PBB SHADOW E&M-EST. PATIENT-LVL V: ICD-10-PCS | Mod: PBBFAC,,, | Performed by: OTOLARYNGOLOGY

## 2023-06-12 PROCEDURE — 3008F PR BODY MASS INDEX (BMI) DOCUMENTED: ICD-10-PCS | Mod: CPTII,S$GLB,, | Performed by: OTOLARYNGOLOGY

## 2023-06-12 PROCEDURE — 31575 DIAGNOSTIC LARYNGOSCOPY: CPT | Mod: S$GLB,,, | Performed by: OTOLARYNGOLOGY

## 2023-06-12 PROCEDURE — 3079F PR MOST RECENT DIASTOLIC BLOOD PRESSURE 80-89 MM HG: ICD-10-PCS | Mod: CPTII,S$GLB,, | Performed by: OTOLARYNGOLOGY

## 2023-06-12 PROCEDURE — 3077F SYST BP >= 140 MM HG: CPT | Mod: CPTII,S$GLB,, | Performed by: OTOLARYNGOLOGY

## 2023-06-12 PROCEDURE — 3077F PR MOST RECENT SYSTOLIC BLOOD PRESSURE >= 140 MM HG: ICD-10-PCS | Mod: CPTII,S$GLB,, | Performed by: OTOLARYNGOLOGY

## 2023-06-12 PROCEDURE — 3008F BODY MASS INDEX DOCD: CPT | Mod: CPTII,S$GLB,, | Performed by: OTOLARYNGOLOGY

## 2023-06-12 PROCEDURE — 3288F FALL RISK ASSESSMENT DOCD: CPT | Mod: CPTII,S$GLB,, | Performed by: OTOLARYNGOLOGY

## 2023-06-12 PROCEDURE — 99214 OFFICE O/P EST MOD 30 MIN: CPT | Mod: 25,S$GLB,, | Performed by: OTOLARYNGOLOGY

## 2023-06-12 PROCEDURE — 4010F PR ACE/ARB THEARPY RXD/TAKEN: ICD-10-PCS | Mod: CPTII,S$GLB,, | Performed by: OTOLARYNGOLOGY

## 2023-06-12 PROCEDURE — 4010F ACE/ARB THERAPY RXD/TAKEN: CPT | Mod: CPTII,S$GLB,, | Performed by: OTOLARYNGOLOGY

## 2023-06-12 PROCEDURE — 1126F AMNT PAIN NOTED NONE PRSNT: CPT | Mod: CPTII,S$GLB,, | Performed by: OTOLARYNGOLOGY

## 2023-06-12 PROCEDURE — 1126F PR PAIN SEVERITY QUANTIFIED, NO PAIN PRESENT: ICD-10-PCS | Mod: CPTII,S$GLB,, | Performed by: OTOLARYNGOLOGY

## 2023-06-12 PROCEDURE — 31575 PR LARYNGOSCOPY, FLEXIBLE; DIAGNOSTIC: ICD-10-PCS | Mod: S$GLB,,, | Performed by: OTOLARYNGOLOGY

## 2023-06-12 PROCEDURE — 99214 PR OFFICE/OUTPT VISIT, EST, LEVL IV, 30-39 MIN: ICD-10-PCS | Mod: 25,S$GLB,, | Performed by: OTOLARYNGOLOGY

## 2023-06-12 PROCEDURE — 3288F PR FALLS RISK ASSESSMENT DOCUMENTED: ICD-10-PCS | Mod: CPTII,S$GLB,, | Performed by: OTOLARYNGOLOGY

## 2023-06-12 PROCEDURE — 1101F PR PT FALLS ASSESS DOC 0-1 FALLS W/OUT INJ PAST YR: ICD-10-PCS | Mod: CPTII,S$GLB,, | Performed by: OTOLARYNGOLOGY

## 2023-06-12 PROCEDURE — 3079F DIAST BP 80-89 MM HG: CPT | Mod: CPTII,S$GLB,, | Performed by: OTOLARYNGOLOGY

## 2023-06-12 RX ORDER — FLUCONAZOLE 100 MG/1
TABLET ORAL
Qty: 11 TABLET | Refills: 0 | Status: SHIPPED | OUTPATIENT
Start: 2023-06-12

## 2023-06-12 NOTE — PROGRESS NOTES
Date of Encounter: 2/15/2022  Provider: Juanita Sellers MD  Referring MD: Hudson Seals MD  PCP: Ewa Jaramillo MD  Rad Onc: Christian Wallace MD  Med Onc:    CC:  Unknown primary of the head neck by Aguila Seals and Christian Wallace, [id number] SCCA    HPI:    Patient is a 66-year-old male who presented for evaluation and treatment of neck disease of an unknown primary of the head neck by Donya Seals and Christian Wallace.  HPV and EBV status unknown at this time due to lack of pathologic material.  NCCN guidelines recommend up front neck dissection followed with radiation therapy +/- chemo pending final path as per Dr Wallace's note.  On presentation today he denies neck pain, otalgia, dysphagia, odynophagia.    Patient reports that he bled heavily following tonsillectomy.  He presented to a local urgent care.  Patient was told that he had an infection it was prescribed Augmentin.  He has been very weak following that episode and due to low blood pressure he has stopped his antihypertensives, Plavix and aspirin.  He did not consult with his physician 1st.    Patient also has a history of tobacco abuse and continues to smoke a pack of cigarettes a day.  He is not interested in tobacco cessation at this time.  He also has a history of depression for which he was taken Wellbutrin.  He also discontinued this as he thought it was not helping him    3/15/2022  Patient is here today for post op visit. Staples were removed last week.  He has no complaints.  He was seen by Dr. Wallace last Friday who noted an also on his right soft palate.  Patient reports that this is painful only when it is touched.  Patient also noted neck swelling starting about 2 days ago.    Patient continues to using nicotine patch and has not smoke since surgery.    3/17/2022  Here today because seroma has reaccumulated  No symptoms    03/22/2022  Patient is here today for follow-up for re-evaluation of his neck.  He presented with postop  seroma.  He also presented with the also involving his right soft palate postop which could be due to trauma versus a small carcinoma.  Patient reports that the seroma has reacumulated with a small amount.  He also had PT scheduled today but it was cancelled due to weather.    5/26/2022  Patient is here today for follow-up.  He is status post an extended right modified radical neck dissection were unknown primary.  Postoperatively his course was complicated with a seroma which was aspirated in clinic.  He is status post completion of chemoradiation 5/13/2022  Patient overall tolerated treatment well except for extreme fatigue.  He canceled his physical and occupational therapy appointments due to feeling exhausted. He was treated with Ritalin by Dr Haddad which gave him nightmares.  He stopped this medication.  He is tolerating a regular diet.  He has lost 15 lb since started treatment but his weight is now stabilized.    12/8/2022  Patient is here routine cancer surveillance.  He was recently seen by Dr. Wallace who reported that his recent PET CT scan was negative (not available for my review today).  Patient has lost 13 pounds since May 2022. He only eats one meal a day--he has always done so. In addition to one meal he eats a banana and one Ensure. Patient reports that he is gaining weight. A more recent weight according to patient was 162. He occasionally gets choked on meats.  He is also using Lymphedema machine at home but it has not assisted with lymphedema and machine leaves deep marks on his face. Patient thinks that it is not fitting correctly.    2/13/2023    Patient is here today for worsening dysphagia. He can tolerate soft foods and soups and some meats: Breakfast: 2 cups coffee plus boost and banana; no lunch; full meal for supper. Some days he does not eat supper (2-3 nights/week). This occurs with anorexia. He has lost 7 pounds since last visit. He reports that drinks 5-12 beers/day and this  "sometimes kills his appetite.  He stopped smoking following surgery.  Patient also feels like there is a "pocket" in his throat.  He has a hx of esophageal stenosis S/P dilation years ago.    He has not seen PT, OT or ST in several months    Patient is to see Dr Wallace in March in addition to a PET CT scan. He just saw [facility] last week and according to patient his lab work was normal. However I cannot view results and it is unclear as to whether a TSH was drawn.    4/4/2023  Patient is here today for routine cancer surveillance.  He has a question regarding his thyroid test results.  He is tolerating a regular diet and has maintained his weight. He is S/P dsyphagia therapy.  He continues to drink 8-10 cans beer/day.    Patient continues with submental edema.  He is using his lymphedema vest daily    6/12/2023  Patient is here today for routine cancer surveillance.   Patient's weight is stable.  He was prescribed medical marijuana by Dr Joy, pain management which has improved his pain and appetite.    He has no new complaints. He is using lymphedema vest daily.      ROS: see HPI  Constitutional: Negative for activity change and appetite change, weight loss.   Eyes: Negative for discharge, visual changes.   Respiratory: Negative for difficulty breathing and wheezing   Cardiovascular: Negative for chest pain.   Gastrointestinal: Negative for abdominal distention and abdominal pain.   Endocrine: Negative for cold intolerance and heat intolerance.   Genitourinary: Negative for dysuria.   Musculoskeletal: Negative for gait problem, muscle pain and joint swelling.   Skin: Negative for color change and pallor; negative for skin lesions.   Neurological: Negative for syncope and weakness; no numbness face.   Psychiatric/Behavioral: Negative for agitation and confusion; negative for depression.    Physical Exam:      Constitutional  General Appearance: well nourished, well-developed, alert, oriented, in no acute distress; " thin but not cachectic  Communication: ability, understanding, normal  Head and Face  Inspection: normocephalic, atraumatic, no scars, lesions or masses   Palpation: no stepoffs, sinus tenderness or masses  Parotid glands: no masses, stones, swelling or tenderness  Eyes  Ocular Motility / Alignment: normal alignment, motility, no proptosis, enophthalmus or nystagmus  Conjunctiva: not injected  Eyelids: no hooding, lag, entropion, or ectropion  Ears:             Right and left ears: EAC's and TM's normal  Nose  External Nose: no lesions, tenderness, trauma or deformity  Intranasal Exam: no edema, erythema, discharge, mass or obstruction  Oral Cavity / Oropharynx  Lips: upper and lower lips pink and moist  Teeth: good dentition  Gingiva: healthy  Oral Mucosa: tdry; no mucosal lesions  Floor of Mouth: normal, no lesions, salivary ducts patent  Tongue: moist, normal mobility, no lesions  Palate: soft and hard palates without lesions or ulcers  Oropharynx: tonsils absent; no lesions  Nasopharynx, Hypopharynx, and Larynx  Indirect:          Thick mucous; post larynx normal  Neck  Inspection and Palpation: right sided hockey incision healed; pedicle to PMMF palpated; submental edema-marked  Larynx and Trachea: normal position; normal crepitus  Thyroid: no tenderness, enlargement or nodules  Submandibular Glands: no masses or tenderness  Lymphatic:  Anterior, Posterior, Submandibular, Submental, Supraclavicular: no masses    Neurological  Cranial Nerves: minimal right marginal nerve paralysis (resected with tumor)  General: no focal deficits  Psychiatric  Orientation: oriented to time, place and person  Mood and Affect: no depression, anxiety or agitation  Extremities  Inspection:  Decreased abduction right upper extremity but improved    PROCEDURES:   -------------------- LARYNGOSCOPY / NASOPHARYNGOSCOPY -------------------------     Pre-op DX:unknown primary  Post-op DX: same  Anesthesia: Topical Neosynephrine /  "Tetracaine  Indications: to look at larynx and pharynx  Adverse Events: None        Procedure in Detail:     Flexible endoscopy with video was performed through the nasal passages. The nasal cavity, nasopharynx, oropharynx, hypopharynx and larynx were adequately visualized. The true vocal cords and arytenoids were examined during phonation and repose.        Operative Findings:     Nasal Cavity: Within normal limits  Nasopharynx: Within normal limits  Tongue Base: Within normal limits  Pharyngeal Walls: edema right lateral pharyngeal wall with overhanging of mucosa over right supraglottis  Epiglottis / Aryepiglottic Folds: marked edema,  lingual and laryngeal surface ; edema bilateral AE folds; R>L  Pyriform Sinus: Within normal limits  Vocal Cords: mobile; decrease abduction  Arytenoids: Within normal limits; moderate edema bilaterally, R > L       Path:    1. LYMPH NODE, EXTERNAL JUGULAR VEIN, EXCISION:       --ONE LYMPH NODE WITH NO MORPHOLOGIC EVIDENCE OF MALIGNANCY (0/1).     2. 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   --LARGEST PART 2 SPECIMEN METASTATIC DEPOSIT: APPROXIMATELY 31    MILLIMETERS.       --EXTRANODAL EXTENSION:  PRESENT.       --NO PERINEURAL INVASION IDENTIFIED.       --SKELETAL MUSCLE INVOLVED BY METASTATIC SQUAMOUS CELL CARCINOMA.   --PD-L1 STUDY AND EBV IMMUNOHISTOCHEMISTRY ARE PENDING AND WILL BE    REPORTED IN ADDENDA.     3. RIGHT NECK, FURTHER DESIGNATED "LEVELS 4 AND 5," DISSECTION:   --ONE OUT OF NINETEEN LYMPH NODES IS POSITIVE FOR METASTATIC    KERATINIZING SQUAMOUS CELL CARCINOMA         (1/19).   --LARGEST PART 3 SPECIMEN METASTATIC DEPOSIT: APPROXIMATELY 8    MILLIMETERS.       --EXTRANODAL EXTENSION:  PRESENT.       --NO PERINEURAL INVASION IDENTIFIED.       --SKELETAL MUSCLE WITH NO MORPHOLOGIC " EVIDENCE OF MALIGNANCY.     Human Papillomavirus (HPV) Testing:   p16 Immunohistochemistry (IHC) as a Surrogate for Transcriptionally    Active High-Risk HPV: NEGATIVE (no staining identified).   EBV negative  PDL 1 positive (85)      PT 5/2023  Assessment   Improved O2 stats with exercise today.  Pt overall improved R UE shoulder ROM, however presents with R winging scapula, mid trap/lower trap weakness, anterior position of right humeral head and pain to anterior right shoulder, with forward elevation after repetitive motion. Continued endurance exercise, postural strengthening, serratus anterior strengthening today. Pt would benefit from scapular and glenohumeral stabilization, postural exercises in order to improve functional mobility with less pain. Pt reports continued poor endurance at home and improving self motivation to exercise.    PT to confirm with pt compliance and proficiency of self MLD and benefit of performing self MLD at home, utilizing compression pump 1-2x day with importance of maintaining consistancy with home program, wearing night time compression every night for best results.       Seven Is progressing well towards his goals. Pt will continue to benefit from skilled outpatient physical therapy to address the deficits listed in the problem list box on initial evaluation, provide pt/family education and to maximize pt's level of independence in the home and community environment.   Pt prognosis is Good.      Patient reports having difficulty with eating and has lost some weight. Dr. Sellers wanted me to return to PT. Pt has decreased mandibular depression limiting opening of mouth, scapular stabilization weakness more noted on R as compared to L and fullness with decreased tissue pliability in submental/ anterior neck region.Poor level of endurance which could be improved.   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 Patient completed  chemotherapy 1x a week and 30 tx of radiation at [facility] in [place]. Pt primary complaint  Patient presents with lymphedema of the head/neck with fullness to submandibular region and anterior/lateral neck, increased pain, increased stiffness in the right shoulder and cervical range of motion, as well as difficulty performing overhead reaching with flexion and abduction ranges and placing the pt at rist of higher infection.     Plan of care discussed with patient: Yes  Pt's spiritual, cultural and educational needs considered and patient is agreeable to the plan of care and goals.    Records reviewed: Dr Wallace 5/3/2023  Assessment and Plan:  Symptomatically, the patient is doing well and I see no evidence for tumor recurrence on history or physical exam findings.  The patient was once again instructed on the importance of taking good care of his teeth, and using the fluoride for dental protection and seeing his dentist regularly.  The patient's TSH in February was elevated at 5.3.  The patient has a copy of that TSH and we have also faxed the TSA report to Dr. Sellers, and the patient will talk with Dr. Sellers about thyroid replacement when he sees Dr. Sellers on April 4.  On April 24, a PET-CT scan will be performed under the direction of Dr. Haddad.  On April 27, the patient will see Dr. Haddad.  I have asked to see the patient in August.     Assessment:   Unknown primary of the head and neck status post right modified radical neck dissection and pectoralis major Levar fascial flap--P 16 negative; PDL1 positive; ALEXANDREA  Marked submental edema-stable; using vest daily  Right shoulder girdle dysfunction  Weight is stable--appetite improved  Laryngeal edema  Alcohol abuse    Plan  F/U 3 months  Please order TSH at next visit

## 2023-06-13 RX ORDER — TRAZODONE HYDROCHLORIDE 100 MG/1
TABLET ORAL
Qty: 60 TABLET | Refills: 1 | Status: SHIPPED | OUTPATIENT
Start: 2023-06-13

## 2023-06-15 PROBLEM — R91.8 PULMONARY NODULES: Status: ACTIVE | Noted: 2023-06-15

## 2023-06-15 PROBLEM — Z77.090 ASBESTOS EXPOSURE: Status: ACTIVE | Noted: 2023-06-15

## 2023-06-15 PROBLEM — J98.4 PNEUMONITIS: Status: ACTIVE | Noted: 2023-06-15

## 2023-06-15 PROBLEM — I35.1 AORTIC EJECTION MURMUR: Status: ACTIVE | Noted: 2023-06-15

## 2023-06-15 PROBLEM — J92.9 PLEURAL PLAQUE: Status: ACTIVE | Noted: 2023-06-15

## 2023-06-15 PROBLEM — R94.2 ABNORMAL PET SCAN OF LUNG: Status: ACTIVE | Noted: 2023-06-15

## 2023-06-15 PROBLEM — F17.201 TOBACCO ABUSE, IN REMISSION: Status: ACTIVE | Noted: 2023-06-15

## 2023-06-15 PROBLEM — Z92.3 STATUS POST RADIATION THERAPY: Status: ACTIVE | Noted: 2023-06-15

## 2023-06-19 PROBLEM — E03.9 ACQUIRED HYPOTHYROIDISM: Status: ACTIVE | Noted: 2023-06-19

## 2023-08-24 ENCOUNTER — OFFICE VISIT (OUTPATIENT)
Dept: OPTOMETRY | Facility: CLINIC | Age: 68
End: 2023-08-24
Payer: MEDICARE

## 2023-08-24 DIAGNOSIS — Z13.5 GLAUCOMA SCREENING: ICD-10-CM

## 2023-08-24 DIAGNOSIS — H43.393 VITREOUS FLOATERS OF BOTH EYES: ICD-10-CM

## 2023-08-24 DIAGNOSIS — H02.052 TRICHIASIS OF RIGHT LOWER EYELID: ICD-10-CM

## 2023-08-24 DIAGNOSIS — H02.054 TRICHIASIS OF LEFT UPPER EYELID: ICD-10-CM

## 2023-08-24 DIAGNOSIS — H04.123 DRY EYE SYNDROME OF BILATERAL LACRIMAL GLANDS: ICD-10-CM

## 2023-08-24 DIAGNOSIS — H25.13 AGE-RELATED NUCLEAR CATARACT, BILATERAL: Primary | ICD-10-CM

## 2023-08-24 DIAGNOSIS — H52.4 PRESBYOPIA: ICD-10-CM

## 2023-08-24 PROCEDURE — 99999 PR PBB SHADOW E&M-EST. PATIENT-LVL III: CPT | Mod: PBBFAC,,,

## 2023-08-24 PROCEDURE — 1101F PT FALLS ASSESS-DOCD LE1/YR: CPT | Mod: CPTII,S$GLB,,

## 2023-08-24 PROCEDURE — 92004 COMPRE OPH EXAM NEW PT 1/>: CPT | Mod: S$GLB,,,

## 2023-08-24 PROCEDURE — 1159F PR MEDICATION LIST DOCUMENTED IN MEDICAL RECORD: ICD-10-PCS | Mod: CPTII,S$GLB,,

## 2023-08-24 PROCEDURE — 3288F PR FALLS RISK ASSESSMENT DOCUMENTED: ICD-10-PCS | Mod: CPTII,S$GLB,,

## 2023-08-24 PROCEDURE — 4010F ACE/ARB THERAPY RXD/TAKEN: CPT | Mod: CPTII,S$GLB,,

## 2023-08-24 PROCEDURE — 1126F AMNT PAIN NOTED NONE PRSNT: CPT | Mod: CPTII,S$GLB,,

## 2023-08-24 PROCEDURE — 3288F FALL RISK ASSESSMENT DOCD: CPT | Mod: CPTII,S$GLB,,

## 2023-08-24 PROCEDURE — 1126F PR PAIN SEVERITY QUANTIFIED, NO PAIN PRESENT: ICD-10-PCS | Mod: CPTII,S$GLB,,

## 2023-08-24 PROCEDURE — 1159F MED LIST DOCD IN RCRD: CPT | Mod: CPTII,S$GLB,,

## 2023-08-24 PROCEDURE — 4010F PR ACE/ARB THEARPY RXD/TAKEN: ICD-10-PCS | Mod: CPTII,S$GLB,,

## 2023-08-24 PROCEDURE — 1101F PR PT FALLS ASSESS DOC 0-1 FALLS W/OUT INJ PAST YR: ICD-10-PCS | Mod: CPTII,S$GLB,,

## 2023-08-24 PROCEDURE — 92004 PR EYE EXAM, NEW PATIENT,COMPREHESV: ICD-10-PCS | Mod: S$GLB,,,

## 2023-08-24 PROCEDURE — 99999 PR PBB SHADOW E&M-EST. PATIENT-LVL III: ICD-10-PCS | Mod: PBBFAC,,,

## 2023-08-28 ENCOUNTER — DOCUMENTATION ONLY (OUTPATIENT)
Dept: ADMINISTRATIVE | Facility: OTHER | Age: 68
End: 2023-08-28
Payer: MEDICARE

## 2023-08-30 NOTE — PROGRESS NOTES
2023      Hudson Seals MD   [address] [facility] [address]      RE: Seven Freire    MR#:  [id number]    :  1955       DX:   1. 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  Right modified radical neck dissection in 2022.  Unknown head and neck primary.  Group pathologic stage IVB, cT0 pN3b (5/39 right cervical lymph nodes were positive, including major extracapsular extension to the sternocleidomastoid muscle, internal jugular vein, adventitia of the common carotid artery and the right pharyngeal constrictors muscles; largest lymph node was 3.1 cm) cM0.  PD-L1 positive.   2.        My 2022 evaluation suggests severe depression.           Dear Hudson:     Your patient returns one year and three months after completing postoperative right neck irradiation, as well as irradiation also delivered to the left neck and all possible sites of primary, including the nasopharynx, oropharynx and hypopharynx.  Since I saw the patient on  of this year, he has been started on thyroid replacement.     On  of this year, a PET-CT scan was performed.  I have reviewed the images, as well as read the radiologist's interpretation.  There are some pleural-based patchy densities in the left lung base with some hypermetabolic activity.  There are also some nodules in the right middle lobe that measure up to 1.7 cm in size.  I agree with the radiologist that these lesions are indeterminate and may be infectious.  In my opinion, these are highly likely to be related to chronic aspiration.     On  of this year, the patient saw Dr. Sellers.  Dr. Sellers did not see any evidence for tumor recurrence on history or physical exam findings.  The patient's submental edema was stable and he was using his neck lymphedema vest.     On  of this year, a CT scan of the chest was performed without IV contrast.  I have  reviewed the images, as well as read the radiologist's interpretation.  There was a non-specific 10 mm pretracheal lymph node.  There was also ground-glass and reticular nodular opacities in the right lower lobe and left lower lobe, and these were thought to be infectious which I agree with.  The liver appeared to suggest cirrhosis.     On July 27, the patient saw Dr. Haddad.  Dr. Haddad referred the patient to Dr. Arcos from Pulmonary and was planning on repeating the CT of the chest.     The patient saw Dr. Arcos and EBUS was scheduled; however, the patient declined to go through with this EBUS biopsy of the mediastinal lymph node.     The patient was reminded to do anti-trismus exercises which he said he is doing.  The patient admitted that he has not been using his fluoride for dental prophylaxis as he has been instructed to do.  He is on thyroid replacement.  Overall, the patient feels like he is doing fairly well.  His depression is dramatically better controlled.     Physical examination shows a man in no distress.  There is no cervical lymphadenopathy.  Examination of the oral cavity and oropharynx is unremarkable.     Fiberoptic scope examination shows moderate edema related to irradiation of the larynx and especially the arytenoid area.  He still has a good airway, however.  There are no hypopharynx, oropharynx or nasopharynx lesions.     Assessment and Plan:  Symptomatically, he is doing fairly well and there is no definite evidence of tumor recurrence on history, physical exam or imaging.  The patient almost certainly has evidence for some aspiration changes in his lung base and has non-specific other chest findings.  He has declined EBUS.  On September 22, the patient will see Dr. Lucian Shoemaker.  I understand that a CT scan of the chest will be repeated in the future.  I have asked to see the patient in about five months.       40 minutes was spent on this lengthy follow up, of which greater than 15  minutes was spent on record and image review, 20 minutes was spent directly with the patient, and over five minutes was spent on documentation.     I appreciate the opportunity to consult on your patients.  Please call me with any questions or comments.     Sincerely,          Electronically Signed By:  MARISA Uriarte/Gabe  DD:  08/29/2023  DT:  08/29/2023  Job #:  [id number]    CC:  Zeenat Arcos M.D., [address] [place] [address], Fax: [phone]        Eaw Jaramillo M.D., [address] [place] [address], Fax: [phone]        Kalpesh Haddad M.D., [facility] [place] [address], Fax: [phone]

## 2023-09-06 ENCOUNTER — OFFICE VISIT (OUTPATIENT)
Dept: HEMATOLOGY/ONCOLOGY | Facility: CLINIC | Age: 68
End: 2023-09-06
Payer: MEDICARE

## 2023-09-06 VITALS
TEMPERATURE: 97 F | SYSTOLIC BLOOD PRESSURE: 139 MMHG | DIASTOLIC BLOOD PRESSURE: 70 MMHG | HEART RATE: 77 BPM | WEIGHT: 154.56 LBS | BODY MASS INDEX: 20.94 KG/M2 | OXYGEN SATURATION: 98 % | HEIGHT: 72 IN | RESPIRATION RATE: 18 BRPM

## 2023-09-06 DIAGNOSIS — Z85.89 HISTORY OF HEAD AND NECK CANCER: ICD-10-CM

## 2023-09-06 DIAGNOSIS — E03.9 ACQUIRED HYPOTHYROIDISM: Primary | ICD-10-CM

## 2023-09-06 DIAGNOSIS — F10.10 ALCOHOL ABUSE: ICD-10-CM

## 2023-09-06 PROCEDURE — 1126F PR PAIN SEVERITY QUANTIFIED, NO PAIN PRESENT: ICD-10-PCS | Mod: CPTII,S$GLB,, | Performed by: OTOLARYNGOLOGY

## 2023-09-06 PROCEDURE — 1101F PT FALLS ASSESS-DOCD LE1/YR: CPT | Mod: CPTII,S$GLB,, | Performed by: OTOLARYNGOLOGY

## 2023-09-06 PROCEDURE — 3288F PR FALLS RISK ASSESSMENT DOCUMENTED: ICD-10-PCS | Mod: CPTII,S$GLB,, | Performed by: OTOLARYNGOLOGY

## 2023-09-06 PROCEDURE — 3008F BODY MASS INDEX DOCD: CPT | Mod: CPTII,S$GLB,, | Performed by: OTOLARYNGOLOGY

## 2023-09-06 PROCEDURE — 3075F PR MOST RECENT SYSTOLIC BLOOD PRESS GE 130-139MM HG: ICD-10-PCS | Mod: CPTII,S$GLB,, | Performed by: OTOLARYNGOLOGY

## 2023-09-06 PROCEDURE — 31575 PR LARYNGOSCOPY, FLEXIBLE; DIAGNOSTIC: ICD-10-PCS | Mod: S$GLB,,, | Performed by: OTOLARYNGOLOGY

## 2023-09-06 PROCEDURE — 99214 PR OFFICE/OUTPT VISIT, EST, LEVL IV, 30-39 MIN: ICD-10-PCS | Mod: 25,S$GLB,, | Performed by: OTOLARYNGOLOGY

## 2023-09-06 PROCEDURE — 4010F PR ACE/ARB THEARPY RXD/TAKEN: ICD-10-PCS | Mod: CPTII,S$GLB,, | Performed by: OTOLARYNGOLOGY

## 2023-09-06 PROCEDURE — 3075F SYST BP GE 130 - 139MM HG: CPT | Mod: CPTII,S$GLB,, | Performed by: OTOLARYNGOLOGY

## 2023-09-06 PROCEDURE — 3078F DIAST BP <80 MM HG: CPT | Mod: CPTII,S$GLB,, | Performed by: OTOLARYNGOLOGY

## 2023-09-06 PROCEDURE — 1159F MED LIST DOCD IN RCRD: CPT | Mod: CPTII,S$GLB,, | Performed by: OTOLARYNGOLOGY

## 2023-09-06 PROCEDURE — 3008F PR BODY MASS INDEX (BMI) DOCUMENTED: ICD-10-PCS | Mod: CPTII,S$GLB,, | Performed by: OTOLARYNGOLOGY

## 2023-09-06 PROCEDURE — 31575 DIAGNOSTIC LARYNGOSCOPY: CPT | Mod: S$GLB,,, | Performed by: OTOLARYNGOLOGY

## 2023-09-06 PROCEDURE — 99999 PR PBB SHADOW E&M-EST. PATIENT-LVL IV: CPT | Mod: PBBFAC,,, | Performed by: OTOLARYNGOLOGY

## 2023-09-06 PROCEDURE — 4010F ACE/ARB THERAPY RXD/TAKEN: CPT | Mod: CPTII,S$GLB,, | Performed by: OTOLARYNGOLOGY

## 2023-09-06 PROCEDURE — 3288F FALL RISK ASSESSMENT DOCD: CPT | Mod: CPTII,S$GLB,, | Performed by: OTOLARYNGOLOGY

## 2023-09-06 PROCEDURE — 3078F PR MOST RECENT DIASTOLIC BLOOD PRESSURE < 80 MM HG: ICD-10-PCS | Mod: CPTII,S$GLB,, | Performed by: OTOLARYNGOLOGY

## 2023-09-06 PROCEDURE — 1159F PR MEDICATION LIST DOCUMENTED IN MEDICAL RECORD: ICD-10-PCS | Mod: CPTII,S$GLB,, | Performed by: OTOLARYNGOLOGY

## 2023-09-06 PROCEDURE — 1126F AMNT PAIN NOTED NONE PRSNT: CPT | Mod: CPTII,S$GLB,, | Performed by: OTOLARYNGOLOGY

## 2023-09-06 PROCEDURE — 1101F PR PT FALLS ASSESS DOC 0-1 FALLS W/OUT INJ PAST YR: ICD-10-PCS | Mod: CPTII,S$GLB,, | Performed by: OTOLARYNGOLOGY

## 2023-09-06 PROCEDURE — 99999 PR PBB SHADOW E&M-EST. PATIENT-LVL IV: ICD-10-PCS | Mod: PBBFAC,,, | Performed by: OTOLARYNGOLOGY

## 2023-09-06 PROCEDURE — 99214 OFFICE O/P EST MOD 30 MIN: CPT | Mod: 25,S$GLB,, | Performed by: OTOLARYNGOLOGY

## 2023-09-06 RX ORDER — SILDENAFIL CITRATE 20 MG/1
TABLET ORAL
COMMUNITY

## 2023-09-06 RX ORDER — ROSUVASTATIN CALCIUM 5 MG/1
5 TABLET, COATED ORAL NIGHTLY
COMMUNITY
Start: 2023-08-29

## 2023-09-06 RX ORDER — PANTOPRAZOLE SODIUM 40 MG/1
40 TABLET, DELAYED RELEASE ORAL NIGHTLY
COMMUNITY
Start: 2023-07-31

## 2023-09-06 NOTE — PROGRESS NOTES
Date of Encounter: 2/15/2022  Provider: Juanita Sellers MD  Referring MD: Hudson Seals MD  PCP: Ewa Jaramillo MD  Rad Onc: Christian Wallace MD  Med Onc: Adonis Hdadad MD  Pain: MD Rufino    CC:  Unknown primary of the head neck by Aguila Seals and Christian Wallace, [id number]    HPI:    Patient is a 66-year-old male who presented for evaluation and treatment of neck disease of an unknown primary of the head neck by Donya Seals and Christian Wallace.  HPV and EBV status unknown at this time due to lack of pathologic material.  NCCN guidelines recommend up front neck dissection followed with radiation therapy +/- chemo pending final path as per Dr Wallace's note.  On presentation today he denies neck pain, otalgia, dysphagia, odynophagia.    Patient reports that he bled heavily following tonsillectomy.  He presented to a local urgent care.  Patient was told that he had an infection it was prescribed Augmentin.  He has been very weak following that episode and due to low blood pressure he has stopped his antihypertensives, Plavix and aspirin.  He did not consult with his physician 1st.    Patient also has a history of tobacco abuse and continues to smoke a pack of cigarettes a day.  He is not interested in tobacco cessation at this time.  He also has a history of depression for which he was taken Wellbutrin.  He also discontinued this as he thought it was not helping him    3/15/2022  Patient is here today for post op visit. Staples were removed last week.  He has no complaints.  He was seen by Dr. Wallace last Friday who noted an also on his right soft palate.  Patient reports that this is painful only when it is touched.  Patient also noted neck swelling starting about 2 days ago.    Patient continues to using nicotine patch and has not smoke since surgery.    3/17/2022  Here today because seroma has reaccumulated  No symptoms    03/22/2022  Patient is here today for follow-up for re-evaluation of his  neck.  He presented with postop seroma.  He also presented with the also involving his right soft palate postop which could be due to trauma versus a small carcinoma.  Patient reports that the seroma has reacumulated with a small amount.  He also had PT scheduled today but it was cancelled due to weather.    5/26/2022  Patient is here today for follow-up.  He is status post an extended right modified radical neck dissection were unknown primary.  Postoperatively his course was complicated with a seroma which was aspirated in clinic.  He is status post completion of chemoradiation 5/13/2022  Patient overall tolerated treatment well except for extreme fatigue.  He canceled his physical and occupational therapy appointments due to feeling exhausted. He was treated with Ritalin by Dr Haddad which gave him nightmares.  He stopped this medication.  He is tolerating a regular diet.  He has lost 15 lb since started treatment but his weight is now stabilized.    12/8/2022  Patient is here routine cancer surveillance.  He was recently seen by Dr. Wallace who reported that his recent PET CT scan was negative (not available for my review today).  Patient has lost 13 pounds since May 2022. He only eats one meal a day--he has always done so. In addition to one meal he eats a banana and one Ensure. Patient reports that he is gaining weight. A more recent weight according to patient was 162. He occasionally gets choked on meats.  He is also using Lymphedema machine at home but it has not assisted with lymphedema and machine leaves deep marks on his face. Patient thinks that it is not fitting correctly.    2/13/2023    Patient is here today for worsening dysphagia. He can tolerate soft foods and soups and some meats: Breakfast: 2 cups coffee plus boost and banana; no lunch; full meal for supper. Some days he does not eat supper (2-3 nights/week). This occurs with anorexia. He has lost 7 pounds since last visit. He reports that  "drinks 5-12 beers/day and this sometimes kills his appetite.  He stopped smoking following surgery.  Patient also feels like there is a "pocket" in his throat.  He has a hx of esophageal stenosis S/P dilation years ago.    He has not seen PT, OT or ST in several months    Patient is to see Dr Wallace in March in addition to a PET CT scan. He just saw Boo last week and according to patient his lab work was normal. However I cannot view results and it is unclear as to whether a TSH was drawn.    4/4/2023  Patient is here today for routine cancer surveillance.  He has a question regarding his thyroid test results.  He is tolerating a regular diet and has maintained his weight. He is S/P dsyphagia therapy.  He continues to drink 8-10 cans beer/day.    Patient continues with submental edema.  He is using his lymphedema vest daily    6/12/2023  Patient is here today for routine cancer surveillance.   Patient's weight is stable.  He was prescribed medical marijuana by Dr Joy, pain management which has improved his pain and appetite.    He has no new complaints. He is using lymphedema vest daily.    9/6/2023  Patient is here today for routine cancer surveillance.  He was prescribed medical marijuana by Dr Jyo which increased his appetite.  However he ran out of it and just refilled it yesterday.  Patient only eats one meal/day.He continues to lose weight. He has lost 10 pounds since December 2022.  He continues to drink 8 beers/day but drinks more duriing football season. He quit smoking.    ROS: see HPI  Constitutional: Negative for activity change and appetite change, weight loss.   Eyes: Negative for discharge, visual changes.   Respiratory: Negative for difficulty breathing and wheezing   Cardiovascular: Negative for chest pain.   Gastrointestinal: Negative for abdominal distention and abdominal pain.   Endocrine: Negative for cold intolerance and heat intolerance.   Genitourinary: Negative for dysuria. "   Musculoskeletal: Negative for gait problem, muscle pain and joint swelling.   Skin: Negative for color change and pallor; negative for skin lesions.   Neurological: Negative for syncope and weakness; no numbness face.   Psychiatric/Behavioral: Negative for agitation and confusion; negative for depression.    Physical Exam:      Constitutional  General Appearance: well nourished, well-developed, alert, oriented, in no acute distress; thin but not cachectic  Communication: ability, understanding, normal  Head and Face  Inspection: normocephalic, atraumatic, no scars, lesions or masses   Palpation: no stepoffs, sinus tenderness or masses  Parotid glands: no masses, stones, swelling or tenderness  Eyes  Ocular Motility / Alignment: normal alignment, motility, no proptosis, enophthalmus or nystagmus  Conjunctiva: not injected  Eyelids: no hooding, lag, entropion, or ectropion  Ears:             Right and left ears: EAC's and TM's normal  Nose  External Nose: no lesions, tenderness, trauma or deformity  Intranasal Exam: no edema, erythema, discharge, mass or obstruction  Oral Cavity / Oropharynx  Lips: upper and lower lips pink and moist  Teeth: good dentition  Gingiva: healthy  Oral Mucosa: tdry; no mucosal lesions  Floor of Mouth: normal, no lesions, salivary ducts patent  Tongue: moist, normal mobility, no lesions  Palate: soft and hard palates without lesions or ulcers  Oropharynx: tonsils absent; no lesions  Nasopharynx, Hypopharynx, and Larynx  Indirect:          Thick mucous; post larynx normal  Neck  Inspection and Palpation: right sided hockey incision healed; pedicle to PMMF palpated; submental edema-marked  Larynx and Trachea: normal position; normal crepitus  Thyroid: no tenderness, enlargement or nodules  Submandibular Glands: no masses or tenderness  Lymphatic:  Anterior, Posterior, Submandibular, Submental, Supraclavicular: no masses    Neurological  Cranial Nerves: very minimal right marginal nerve  "paralysis   General: no focal deficits  Psychiatric  Orientation: oriented to time, place and person  Mood and Affect: no depression, anxiety or agitation  Extremities  Inspection:  Decreased abduction right upper extremity but improved    PROCEDURES:   -------------------- LARYNGOSCOPY / NASOPHARYNGOSCOPY -------------------------     Pre-op DX:unknown primary  Post-op DX: same  Anesthesia: Topical Neosynephrine / Tetracaine  Indications: to look at larynx and pharynx  Adverse Events: None        Procedure in Detail:     Flexible endoscopy with video was performed through the nasal passages. The nasal cavity, nasopharynx, oropharynx, hypopharynx and larynx were adequately visualized. The true vocal cords and arytenoids were examined during phonation and repose.        Operative Findings: copious pooling of secretions valleculae and post cricoid area     Nasal Cavity: Within normal limits  Nasopharynx: Within normal limits  Tongue Base: Within normal limits  Pharyngeal Walls: edema right lateral pharyngeal wall -improved  Epiglottis / Aryepiglottic Folds: edema lingual and laryngeal surface epiglottis-improved; edema bilateral AE folds; R>L  Pyriform Sinus: Within normal limits  Vocal Cords: mobile; decrease abduction  Arytenoids: Within normal limits; moderate edema bilaterally, R > L       Path:    1. LYMPH NODE, EXTERNAL JUGULAR VEIN, EXCISION:       --ONE LYMPH NODE WITH NO MORPHOLOGIC EVIDENCE OF MALIGNANCY (0/1).     2. 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   --LARGEST PART 2 SPECIMEN METASTATIC DEPOSIT: APPROXIMATELY 31    MILLIMETERS.       --EXTRANODAL EXTENSION:  PRESENT.       --NO PERINEURAL INVASION IDENTIFIED.       --SKELETAL MUSCLE INVOLVED BY METASTATIC SQUAMOUS CELL CARCINOMA.   --PD-L1 STUDY AND EBV " "IMMUNOHISTOCHEMISTRY ARE PENDING AND WILL BE    REPORTED IN ADDENDA.     3. RIGHT NECK, FURTHER DESIGNATED "LEVELS 4 AND 5," DISSECTION:   --ONE OUT OF NINETEEN LYMPH NODES IS POSITIVE FOR METASTATIC    KERATINIZING SQUAMOUS CELL CARCINOMA         (1/19).   --LARGEST PART 3 SPECIMEN METASTATIC DEPOSIT: APPROXIMATELY 8    MILLIMETERS.       --EXTRANODAL EXTENSION:  PRESENT.       --NO PERINEURAL INVASION IDENTIFIED.       --SKELETAL MUSCLE WITH NO MORPHOLOGIC EVIDENCE OF MALIGNANCY.     Human Papillomavirus (HPV) Testing:   p16 Immunohistochemistry (IHC) as a Surrogate for Transcriptionally    Active High-Risk HPV: NEGATIVE (no staining identified).   EBV negative  PDL 1 positive (85)      PT 5/2023  Assessment   Improved O2 stats with exercise today.  Pt overall improved R UE shoulder ROM, however presents with R winging scapula, mid trap/lower trap weakness, anterior position of right humeral head and pain to anterior right shoulder, with forward elevation after repetitive motion. Continued endurance exercise, postural strengthening, serratus anterior strengthening today. Pt would benefit from scapular and glenohumeral stabilization, postural exercises in order to improve functional mobility with less pain. Pt reports continued poor endurance at home and improving self motivation to exercise.    PT to confirm with pt compliance and proficiency of self MLD and benefit of performing self MLD at home, utilizing compression pump 1-2x day with importance of maintaining consistancy with home program, wearing night time compression every night for best results.       Seven Is progressing well towards his goals. Pt will continue to benefit from skilled outpatient physical therapy to address the deficits listed in the problem list box on initial evaluation, provide pt/family education and to maximize pt's level of independence in the home and community environment.   Pt prognosis is Good.      Patient reports having " difficulty with eating and has lost some weight. Dr. Sellers wanted me to return to PT. Pt has decreased mandibular depression limiting opening of mouth, scapular stabilization weakness more noted on R as compared to L and fullness with decreased tissue pliability in submental/ anterior neck region.Poor level of endurance which could be improved.   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 Patient completed  chemotherapy 1x a week and 30 tx of radiation at [facility] in [place]. Pt primary complaint  Patient presents with lymphedema of the head/neck with fullness to submandibular region and anterior/lateral neck, increased pain, increased stiffness in the right shoulder and cervical range of motion, as well as difficulty performing overhead reaching with flexion and abduction ranges and placing the pt at rist of higher infection.     Plan of care discussed with patient: Yes  Pt's spiritual, cultural and educational needs considered and patient is agreeable to the plan of care and goals.    Records reviewed: Dr Wallace 8/2023  Physical examination shows a man in no distress.  There is no cervical lymphadenopathy.  Examination of the oral cavity and oropharynx is unremarkable.      Fiberoptic scope examination shows moderate edema related to irradiation of the larynx and especially the arytenoid area.  He still has a good airway, however.  There are no hypopharynx, oropharynx or nasopharynx lesions.      Assessment and Plan:  Symptomatically, he is doing fairly well and there is no definite evidence of tumor recurrence on history, physical exam or imaging.  The patient almost certainly has evidence for some aspiration changes in his lung base and has non-specific other chest findings.  He has declined EBUS.  On September 22, the patient will see   Lucian Shoemaker.  I understand that a CT scan of the chest will be repeated in the future.  I have asked to see the patient in about five months.       Assessment:   Unknown primary of the head and neck status post right modified radical neck dissection and pectoralis major Levar fascial flap--P 16 negative; PDL1 positive; Submental edema-stable; using vest daily  Right shoulder girdle dysfunction-improved  Weight loss.  Patient only eats 1 meal a day and continues to drink substantial amount of alcohol  Copious pooling of secretions which puts patient at risk for post swallow aspiration   Alcohol abuse    Plan  F/U 3 months  TSH  Restart SS gargles about 10 times a day  Increase po intake  Decrease alcohol intake

## 2023-11-06 ENCOUNTER — OFFICE VISIT (OUTPATIENT)
Dept: UROLOGY | Facility: CLINIC | Age: 68
End: 2023-11-06
Payer: MEDICARE

## 2023-11-06 VITALS — WEIGHT: 151.44 LBS | HEIGHT: 72 IN | BODY MASS INDEX: 20.51 KG/M2

## 2023-11-06 DIAGNOSIS — N40.1 BPH WITH URINARY OBSTRUCTION: Primary | ICD-10-CM

## 2023-11-06 DIAGNOSIS — N52.01 ERECTILE DYSFUNCTION DUE TO ARTERIAL INSUFFICIENCY: ICD-10-CM

## 2023-11-06 DIAGNOSIS — N13.8 BPH WITH URINARY OBSTRUCTION: Primary | ICD-10-CM

## 2023-11-06 LAB
BILIRUBIN, UA POC OHS: ABNORMAL
BLOOD, UA POC OHS: NEGATIVE
CLARITY, UA POC OHS: CLEAR
COLOR, UA POC OHS: YELLOW
GLUCOSE, UA POC OHS: NEGATIVE
KETONES, UA POC OHS: ABNORMAL
LEUKOCYTES, UA POC OHS: NEGATIVE
NITRITE, UA POC OHS: NEGATIVE
PH, UA POC OHS: 6
POC RESIDUAL URINE VOLUME: 0 ML (ref 0–100)
PROTEIN, UA POC OHS: ABNORMAL
SPECIFIC GRAVITY, UA POC OHS: 1.01
UROBILINOGEN, UA POC OHS: 0.2

## 2023-11-06 PROCEDURE — 99999 PR PBB SHADOW E&M-EST. PATIENT-LVL IV: CPT | Mod: PBBFAC,,, | Performed by: STUDENT IN AN ORGANIZED HEALTH CARE EDUCATION/TRAINING PROGRAM

## 2023-11-06 PROCEDURE — 1101F PT FALLS ASSESS-DOCD LE1/YR: CPT | Mod: CPTII,S$GLB,, | Performed by: STUDENT IN AN ORGANIZED HEALTH CARE EDUCATION/TRAINING PROGRAM

## 2023-11-06 PROCEDURE — 1125F AMNT PAIN NOTED PAIN PRSNT: CPT | Mod: CPTII,S$GLB,, | Performed by: STUDENT IN AN ORGANIZED HEALTH CARE EDUCATION/TRAINING PROGRAM

## 2023-11-06 PROCEDURE — 1101F PR PT FALLS ASSESS DOC 0-1 FALLS W/OUT INJ PAST YR: ICD-10-PCS | Mod: CPTII,S$GLB,, | Performed by: STUDENT IN AN ORGANIZED HEALTH CARE EDUCATION/TRAINING PROGRAM

## 2023-11-06 PROCEDURE — 1159F MED LIST DOCD IN RCRD: CPT | Mod: CPTII,S$GLB,, | Performed by: STUDENT IN AN ORGANIZED HEALTH CARE EDUCATION/TRAINING PROGRAM

## 2023-11-06 PROCEDURE — 99204 PR OFFICE/OUTPT VISIT, NEW, LEVL IV, 45-59 MIN: ICD-10-PCS | Mod: S$GLB,,, | Performed by: STUDENT IN AN ORGANIZED HEALTH CARE EDUCATION/TRAINING PROGRAM

## 2023-11-06 PROCEDURE — 51798 US URINE CAPACITY MEASURE: CPT | Mod: S$GLB,,, | Performed by: STUDENT IN AN ORGANIZED HEALTH CARE EDUCATION/TRAINING PROGRAM

## 2023-11-06 PROCEDURE — 81003 URINALYSIS AUTO W/O SCOPE: CPT | Mod: QW,S$GLB,, | Performed by: STUDENT IN AN ORGANIZED HEALTH CARE EDUCATION/TRAINING PROGRAM

## 2023-11-06 PROCEDURE — 4010F PR ACE/ARB THEARPY RXD/TAKEN: ICD-10-PCS | Mod: CPTII,S$GLB,, | Performed by: STUDENT IN AN ORGANIZED HEALTH CARE EDUCATION/TRAINING PROGRAM

## 2023-11-06 PROCEDURE — 99204 OFFICE O/P NEW MOD 45 MIN: CPT | Mod: S$GLB,,, | Performed by: STUDENT IN AN ORGANIZED HEALTH CARE EDUCATION/TRAINING PROGRAM

## 2023-11-06 PROCEDURE — 3288F FALL RISK ASSESSMENT DOCD: CPT | Mod: CPTII,S$GLB,, | Performed by: STUDENT IN AN ORGANIZED HEALTH CARE EDUCATION/TRAINING PROGRAM

## 2023-11-06 PROCEDURE — 4010F ACE/ARB THERAPY RXD/TAKEN: CPT | Mod: CPTII,S$GLB,, | Performed by: STUDENT IN AN ORGANIZED HEALTH CARE EDUCATION/TRAINING PROGRAM

## 2023-11-06 PROCEDURE — 99999 PR PBB SHADOW E&M-EST. PATIENT-LVL IV: ICD-10-PCS | Mod: PBBFAC,,, | Performed by: STUDENT IN AN ORGANIZED HEALTH CARE EDUCATION/TRAINING PROGRAM

## 2023-11-06 PROCEDURE — 51798 POCT BLADDER SCAN: ICD-10-PCS | Mod: S$GLB,,, | Performed by: STUDENT IN AN ORGANIZED HEALTH CARE EDUCATION/TRAINING PROGRAM

## 2023-11-06 PROCEDURE — 3288F PR FALLS RISK ASSESSMENT DOCUMENTED: ICD-10-PCS | Mod: CPTII,S$GLB,, | Performed by: STUDENT IN AN ORGANIZED HEALTH CARE EDUCATION/TRAINING PROGRAM

## 2023-11-06 PROCEDURE — 1159F PR MEDICATION LIST DOCUMENTED IN MEDICAL RECORD: ICD-10-PCS | Mod: CPTII,S$GLB,, | Performed by: STUDENT IN AN ORGANIZED HEALTH CARE EDUCATION/TRAINING PROGRAM

## 2023-11-06 PROCEDURE — 3008F PR BODY MASS INDEX (BMI) DOCUMENTED: ICD-10-PCS | Mod: CPTII,S$GLB,, | Performed by: STUDENT IN AN ORGANIZED HEALTH CARE EDUCATION/TRAINING PROGRAM

## 2023-11-06 PROCEDURE — 1125F PR PAIN SEVERITY QUANTIFIED, PAIN PRESENT: ICD-10-PCS | Mod: CPTII,S$GLB,, | Performed by: STUDENT IN AN ORGANIZED HEALTH CARE EDUCATION/TRAINING PROGRAM

## 2023-11-06 PROCEDURE — 81003 POCT URINALYSIS(INSTRUMENT): ICD-10-PCS | Mod: QW,S$GLB,, | Performed by: STUDENT IN AN ORGANIZED HEALTH CARE EDUCATION/TRAINING PROGRAM

## 2023-11-06 PROCEDURE — 3008F BODY MASS INDEX DOCD: CPT | Mod: CPTII,S$GLB,, | Performed by: STUDENT IN AN ORGANIZED HEALTH CARE EDUCATION/TRAINING PROGRAM

## 2023-11-06 RX ORDER — FLUOXETINE HYDROCHLORIDE 20 MG/1
20 CAPSULE ORAL
COMMUNITY
Start: 2023-10-09

## 2023-11-06 RX ORDER — TAMSULOSIN HYDROCHLORIDE 0.4 MG/1
0.4 CAPSULE ORAL NIGHTLY
Qty: 30 CAPSULE | Refills: 11 | Status: SHIPPED | OUTPATIENT
Start: 2023-11-06 | End: 2024-11-05

## 2023-11-06 NOTE — PROGRESS NOTES
[facility]   Clinic Note    Subjective:     Chief Complaint: Benign Prostatic Hypertrophy      History of Present Illness:  Seven Freire is a 68 y.o. male who presents to clinic for evaluation and management of LUTS. He is new to our clinic referred by Dr. Juanita Sellers    He reports LUTS. Symptoms include sensation of incomplete emptying, urgency. Daytime frequency is 10x. Nocturia 7-8x. PVR was measured as 0 mL. Symptoms have been present for a 1-2 years and have progressed. Denies dysuria or hematuria. He drinks beer about 8-10 most days. IPSS 6/2.     He reports erectile dysfunction. He has taken PDE-5i in the past with mixed results.     IPSS Questionnaire (AUA-SS):  1) Incomplete Emptying 0 - Not at all   2) Frequency 2 - Less than half the time   3) Intermittency 0 - Not at all   4) Urgency 0 - Not at all   5) Weak Stream  0 - Not at all   6) Straining 0 - Not at all   7) Nocturia 4 - 4 times   Total score:   6   Quality of Life:  2 - Mostly Satisfied      Past medical, family, surgical and social history reviewed as documented in chart with pertinent positive medical, family, surgical and social history detailed in HPI.    A review systems was conducted with pertinent positive and negative findings documented in HPI.    Objective:     Estimated body mass index is 20.54 kg/m² as calculated from the following:    Height as of this encounter: 6' (1.829 m).    Weight as of this encounter: 68.7 kg (151 lb 7.3 oz).    Vital Signs (Most Recent)       Physical Exam  Constitutional:       General: He is not in acute distress.     Appearance: He is not ill-appearing or toxic-appearing.   Pulmonary:      Effort: Pulmonary effort is normal. No accessory muscle usage or respiratory distress.   Neurological:      Mental Status: He is alert.         Labs reviewed below:  Lab Results   Component Value Date    BUN 22 (H) 03/07/2022    CREATININE 0.65 03/07/2022    WBC 6.86 03/04/2022    HGB 11.9 (L) 03/04/2022     HCT 37.8 (L) 03/04/2022     03/04/2022    AST 36 03/04/2022    ALT 31 03/04/2022    ALKPHOS 287 (H) 03/04/2022    ALBUMIN 3.3 (L) 03/04/2022    ALBUMIN 3.3 (L) 03/04/2022      Urine dipstick today was negative for blood, leukocytes, and nitrites.     Assessment:     1. BPH with urinary obstruction    2. Erectile dysfunction due to arterial insufficiency      Plan:     We reviewed his LUTS. This is a chronic issue which has progressively worsened. We discussed options including medications and outlet procedures. He is interested in medications and further evaluation.  I explained behavioral modifications including decreasing alcohol and cutting fluids later in the evening.     Flomax Rx  Prostate US for size as well as office based flexible cystoscopy    We discussed options for the ED. He is not interested in medications at this time    Zeus Rodriguez MD

## 2023-11-08 ENCOUNTER — HOSPITAL ENCOUNTER (OUTPATIENT)
Dept: RADIOLOGY | Facility: HOSPITAL | Age: 68
Discharge: HOME | End: 2023-11-08
Attending: STUDENT IN AN ORGANIZED HEALTH CARE EDUCATION/TRAINING PROGRAM
Payer: MEDICARE

## 2023-11-08 DIAGNOSIS — N13.8 BPH WITH URINARY OBSTRUCTION: ICD-10-CM

## 2023-11-08 DIAGNOSIS — N40.1 BPH WITH URINARY OBSTRUCTION: ICD-10-CM

## 2023-11-08 PROCEDURE — 76857 US PELVIS LIMITED NON OB: ICD-10-PCS | Mod: 26,,, | Performed by: RADIOLOGY

## 2023-11-08 PROCEDURE — 76857 US EXAM PELVIC LIMITED: CPT | Mod: TC,PO

## 2023-11-08 PROCEDURE — 76857 US EXAM PELVIC LIMITED: CPT | Mod: 26,,, | Performed by: RADIOLOGY

## 2023-12-07 ENCOUNTER — LAB VISIT (OUTPATIENT)
Dept: LAB | Facility: HOSPITAL | Age: 68
End: 2023-12-07
Attending: OTOLARYNGOLOGY
Payer: MEDICARE

## 2023-12-07 ENCOUNTER — OFFICE VISIT (OUTPATIENT)
Dept: HEMATOLOGY/ONCOLOGY | Facility: CLINIC | Age: 68
End: 2023-12-07
Payer: MEDICARE

## 2023-12-07 VITALS
OXYGEN SATURATION: 96 % | RESPIRATION RATE: 18 BRPM | WEIGHT: 155.19 LBS | DIASTOLIC BLOOD PRESSURE: 85 MMHG | TEMPERATURE: 98 F | SYSTOLIC BLOOD PRESSURE: 171 MMHG | HEART RATE: 91 BPM | BODY MASS INDEX: 21.05 KG/M2

## 2023-12-07 DIAGNOSIS — E03.9 ACQUIRED HYPOTHYROIDISM: ICD-10-CM

## 2023-12-07 DIAGNOSIS — C79.89 SECONDARY SQUAMOUS CELL CARCINOMA OF HEAD AND NECK WITH UNKNOWN PRIMARY SITE: Primary | ICD-10-CM

## 2023-12-07 DIAGNOSIS — C80.1 SECONDARY SQUAMOUS CELL CARCINOMA OF HEAD AND NECK WITH UNKNOWN PRIMARY SITE: Primary | ICD-10-CM

## 2023-12-07 LAB — TSH SERPL DL<=0.005 MIU/L-ACNC: 2.15 UIU/ML (ref 0.4–4)

## 2023-12-07 PROCEDURE — 3077F SYST BP >= 140 MM HG: CPT | Mod: CPTII,S$GLB,, | Performed by: STUDENT IN AN ORGANIZED HEALTH CARE EDUCATION/TRAINING PROGRAM

## 2023-12-07 PROCEDURE — 3008F BODY MASS INDEX DOCD: CPT | Mod: CPTII,S$GLB,, | Performed by: STUDENT IN AN ORGANIZED HEALTH CARE EDUCATION/TRAINING PROGRAM

## 2023-12-07 PROCEDURE — 1159F MED LIST DOCD IN RCRD: CPT | Mod: CPTII,S$GLB,, | Performed by: STUDENT IN AN ORGANIZED HEALTH CARE EDUCATION/TRAINING PROGRAM

## 2023-12-07 PROCEDURE — 99214 PR OFFICE/OUTPT VISIT, EST, LEVL IV, 30-39 MIN: ICD-10-PCS | Mod: 25,S$GLB,, | Performed by: STUDENT IN AN ORGANIZED HEALTH CARE EDUCATION/TRAINING PROGRAM

## 2023-12-07 PROCEDURE — 3008F PR BODY MASS INDEX (BMI) DOCUMENTED: ICD-10-PCS | Mod: CPTII,S$GLB,, | Performed by: STUDENT IN AN ORGANIZED HEALTH CARE EDUCATION/TRAINING PROGRAM

## 2023-12-07 PROCEDURE — 3288F FALL RISK ASSESSMENT DOCD: CPT | Mod: CPTII,S$GLB,, | Performed by: STUDENT IN AN ORGANIZED HEALTH CARE EDUCATION/TRAINING PROGRAM

## 2023-12-07 PROCEDURE — 36415 COLL VENOUS BLD VENIPUNCTURE: CPT | Mod: PN | Performed by: OTOLARYNGOLOGY

## 2023-12-07 PROCEDURE — 1125F AMNT PAIN NOTED PAIN PRSNT: CPT | Mod: CPTII,S$GLB,, | Performed by: STUDENT IN AN ORGANIZED HEALTH CARE EDUCATION/TRAINING PROGRAM

## 2023-12-07 PROCEDURE — 84443 ASSAY THYROID STIM HORMONE: CPT | Performed by: OTOLARYNGOLOGY

## 2023-12-07 PROCEDURE — 99999 PR PBB SHADOW E&M-EST. PATIENT-LVL IV: CPT | Mod: PBBFAC,,, | Performed by: STUDENT IN AN ORGANIZED HEALTH CARE EDUCATION/TRAINING PROGRAM

## 2023-12-07 PROCEDURE — 3077F PR MOST RECENT SYSTOLIC BLOOD PRESSURE >= 140 MM HG: ICD-10-PCS | Mod: CPTII,S$GLB,, | Performed by: STUDENT IN AN ORGANIZED HEALTH CARE EDUCATION/TRAINING PROGRAM

## 2023-12-07 PROCEDURE — 3079F DIAST BP 80-89 MM HG: CPT | Mod: CPTII,S$GLB,, | Performed by: STUDENT IN AN ORGANIZED HEALTH CARE EDUCATION/TRAINING PROGRAM

## 2023-12-07 PROCEDURE — 4010F ACE/ARB THERAPY RXD/TAKEN: CPT | Mod: CPTII,S$GLB,, | Performed by: STUDENT IN AN ORGANIZED HEALTH CARE EDUCATION/TRAINING PROGRAM

## 2023-12-07 PROCEDURE — 1159F PR MEDICATION LIST DOCUMENTED IN MEDICAL RECORD: ICD-10-PCS | Mod: CPTII,S$GLB,, | Performed by: STUDENT IN AN ORGANIZED HEALTH CARE EDUCATION/TRAINING PROGRAM

## 2023-12-07 PROCEDURE — 1100F PTFALLS ASSESS-DOCD GE2>/YR: CPT | Mod: CPTII,S$GLB,, | Performed by: STUDENT IN AN ORGANIZED HEALTH CARE EDUCATION/TRAINING PROGRAM

## 2023-12-07 PROCEDURE — 99999 PR PBB SHADOW E&M-EST. PATIENT-LVL IV: ICD-10-PCS | Mod: PBBFAC,,, | Performed by: STUDENT IN AN ORGANIZED HEALTH CARE EDUCATION/TRAINING PROGRAM

## 2023-12-07 PROCEDURE — 31575 DIAGNOSTIC LARYNGOSCOPY: CPT | Mod: S$GLB,,, | Performed by: STUDENT IN AN ORGANIZED HEALTH CARE EDUCATION/TRAINING PROGRAM

## 2023-12-07 PROCEDURE — 1125F PR PAIN SEVERITY QUANTIFIED, PAIN PRESENT: ICD-10-PCS | Mod: CPTII,S$GLB,, | Performed by: STUDENT IN AN ORGANIZED HEALTH CARE EDUCATION/TRAINING PROGRAM

## 2023-12-07 PROCEDURE — 1100F PR PT FALLS ASSESS DOC 2+ FALLS/FALL W/INJURY/YR: ICD-10-PCS | Mod: CPTII,S$GLB,, | Performed by: STUDENT IN AN ORGANIZED HEALTH CARE EDUCATION/TRAINING PROGRAM

## 2023-12-07 PROCEDURE — 99214 OFFICE O/P EST MOD 30 MIN: CPT | Mod: 25,S$GLB,, | Performed by: STUDENT IN AN ORGANIZED HEALTH CARE EDUCATION/TRAINING PROGRAM

## 2023-12-07 PROCEDURE — 4010F PR ACE/ARB THEARPY RXD/TAKEN: ICD-10-PCS | Mod: CPTII,S$GLB,, | Performed by: STUDENT IN AN ORGANIZED HEALTH CARE EDUCATION/TRAINING PROGRAM

## 2023-12-07 PROCEDURE — 31575 PR LARYNGOSCOPY, FLEXIBLE; DIAGNOSTIC: ICD-10-PCS | Mod: S$GLB,,, | Performed by: STUDENT IN AN ORGANIZED HEALTH CARE EDUCATION/TRAINING PROGRAM

## 2023-12-07 PROCEDURE — 3079F PR MOST RECENT DIASTOLIC BLOOD PRESSURE 80-89 MM HG: ICD-10-PCS | Mod: CPTII,S$GLB,, | Performed by: STUDENT IN AN ORGANIZED HEALTH CARE EDUCATION/TRAINING PROGRAM

## 2023-12-07 PROCEDURE — 3288F PR FALLS RISK ASSESSMENT DOCUMENTED: ICD-10-PCS | Mod: CPTII,S$GLB,, | Performed by: STUDENT IN AN ORGANIZED HEALTH CARE EDUCATION/TRAINING PROGRAM

## 2023-12-07 NOTE — PROGRESS NOTES
Note to patients: In accordance with the  Century Cures Act, patients are now granted immediate electronic access to their medical records. This note is primarily intended for communication among medical professionals. As a result, it may incorporate medical terminology, abbreviations, or language that could appear blunt or unfamiliar. If you have questions about this document, we encourage you to discuss it with your physician.      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 male presenting for surveillance of SCC unknown primary s/p neck dissection and CCRT completed 2022. He lost a significant amount of weight during treatment and has recently been prescribed medical marijuana to help his appetite. He was able to stop smoking but continued drinking. He was started on Synthroid for hypothyroidism.    Patient reports that he fell and hit his head earlier this week. Denies LOC but did not seek medical care. He has been wearing a soft cervical collar to help with his neck pain. He declines the recommendation to go to the ED for evaluation.    Patient denies fever, chills, night sweats,  neck mass, enlarged lymph nodes outside of the head or neck, odynophagia, dysphagia,  globus sensation, voice changes, cough, hemoptysis, shortness of breath, or new or concerning skin lesions.     TREATMENT HISTORY:  Neck dissection 2/2022  [facility]    SUBSTANCE USE:  Smoking: Former, >20 pack years  Alcohol: Daily 8-10 drinks  Denies hookah, chewing tobacco, marijuana, betel nut, illicit drug, heavy cigar, vape use.    ALLERGIES:  Review of patient's allergies indicates:   Allergen Reactions    Unasyn [ampicillin-sulbactam] Hallucinations         MEDICATIONS:    Current Outpatient Medications:     albuterol (PROVENTIL/VENTOLIN HFA) 90 mcg/actuation inhaler, Inhale 1-2 puffs into the lungs every 6 (six) hours as needed for Wheezing or Shortness of Breath., Disp: , Rfl:     amLODIPine (NORVASC) 5 MG tablet, Take 5 mg by mouth once daily., Disp: , Rfl:     buPROPion (WELLBUTRIN SR) 150 MG TBSR 12 hr tablet, , Disp: , Rfl:     clopidogreL (PLAVIX) 75 mg tablet, Take 75 mg by mouth once daily., Disp: , Rfl:     dronabinoL (MARINOL) 2.5 MG capsule, Take 2.5 mg by mouth once daily., Disp: , Rfl:     DUEXIS 800-26.6 mg Tab, Take 1 tablet by mouth 3 (three) times daily as needed., Disp: , Rfl:     ezetimibe (ZETIA) 10 mg tablet, Take 10 mg by mouth once daily., Disp: , Rfl:     FLUoxetine 20 MG capsule, Take 20 mg by mouth., Disp: , Rfl:     irbesartan (AVAPRO) 300 MG tablet, Take 300 mg by mouth once daily., Disp: , Rfl:     levothyroxine (SYNTHROID) 75 MCG tablet, Take 1 tablet (75 mcg total) by mouth once daily., Disp: 30 tablet, Rfl: 6    magnesium oxide (MAG-OX) 400 mg (241.3 mg magnesium) tablet, Take 1 tablet by mouth 3 (three) times daily., Disp: , Rfl:     methylphenidate HCl (RITALIN) 5 MG tablet, Take 5 mg by mouth 2 (two) times daily., Disp: , Rfl:     rosuvastatin (CRESTOR) 5 MG tablet, Take 5 mg by mouth every evening., Disp: , Rfl:     sildenafil (REVATIO) 20 mg Tab, TAKE TWO TABLETS BY MOUTH AS NEEDED EVERY DAY   30; 20 MG; 30, Disp: , Rfl:     tamsulosin  (FLOMAX) 0.4 mg Cap, Take 1 capsule (0.4 mg total) by mouth every evening., Disp: 30 capsule, Rfl: 11    traZODone (DESYREL) 100 MG tablet, TAKE 1 OR 2 TABLETS BY MOUTH IN THE EVENING, Disp: 60 tablet, Rfl: 1    vitamin D (VITAMIN D3) 1000 units Tab, Take 1,000 Units by mouth once daily., Disp: , Rfl:     vitamin E 400 UNIT capsule, Take 400 Units by mouth once daily., Disp: , Rfl:     XTAMPZA ER 13.5 mg CSpT, Take by mouth., Disp: , Rfl:     dexAMETHasone (DECADRON) 4 MG Tab, 1 tablet orally every 6 hours as needed for nausea and vomiting. take with breakfast and at lunchtime, do not take after 300PM to prevent insomnia, Disp: , Rfl:     fluconazole (DIFLUCAN) 100 MG tablet, Take 2 pills the first day and then one daily after that (Patient not taking: Reported on 9/6/2023), Disp: 11 tablet, Rfl: 0    ondansetron (ZOFRAN-ODT) 4 MG TbDL, TAKE ONE TABLET UNDER THE TONGUE EVERY 6 HOURS FOR nausea, Disp: , Rfl:     pantoprazole (PROTONIX) 40 MG tablet, Take 40 mg by mouth every evening., Disp: , Rfl:     PAST MEDICAL HISTORY:  Past Medical History:   Diagnosis Date    Cancer 01/2022    neck    Coronary artery disease     Depression     History of wheezing     Hypercholesteremia     Hypertension         PAST SURGICAL HISTORY:  Past Surgical History:   Procedure Laterality Date    BIOPSY OF PHARYNX N/A 01/28/2022    Procedure: BIOPSY, PHARYNX;  Surgeon: Hudson Seals MD;  Location: [facility];  Service: ENT;  Laterality: N/A;    CORONARY ANGIOPLASTY WITH STENT PLACEMENT  2015    X 4     DIRECT LARYNGOSCOPY N/A 01/28/2022    Procedure: LARYNGOSCOPY, DIRECT;  Surgeon: Hudson Seals MD;  Location: [facility];  Service: ENT;  Laterality: N/A;    ESOPHAGOSCOPY N/A 01/28/2022    Procedure: ESOPHAGOSCOPY;  Surgeon: Hudson Seals MD;  Location: [facility];  Service: ENT;  Laterality: N/A;    INSERTION OF TUNNELED CENTRAL VENOUS CATHETER (CVC) WITH SUBCUTANEOUS PORT Left 03/24/2022    Procedure: [id number];   Surgeon: Oliver Breen MD;  [facility];  Service: General;  Laterality: Left;    NASAL ENDOSCOPY N/A 2022    Procedure: ENDOSCOPY, NOSE;  Surgeon: Hudson Seals MD;  [facility];  Service: ENT;  Laterality: N/A;    RADICAL NECK DISSECTION Right 2022    Procedure: DISSECTION, NECK, RADICAL - Modified Radical;  Surgeon: Juanita Sellers MD;  Location: [facility];  Service: ENT;  Laterality: Right;    TONSILLECTOMY, ADENOIDECTOMY Bilateral 2022    Procedure: TONSILLECTOMY AND ADENOIDECTOMY;  Surgeon: Hudson Seals MD;  [facility];  Service: ENT;  Laterality: Bilateral;        FAMILY HISTORY:  Family History   Problem Relation Age of Onset    Cataracts Mother     Diabetes Mother     Diabetes Brother     Glaucoma Neg Hx     Macular degeneration Neg Hx        SOCIAL HISTORY:  Social History     Socioeconomic History    Marital status:    Tobacco Use    Smoking status: Former     Current packs/day: 0.00     Average packs/day: 1 pack/day for 25.2 years (25.2 ttl pk-yrs)     Types: Vaping with nicotine, Cigarettes     Start date:      Quit date: 3/1/2022     Years since quittin.7    Smokeless tobacco: Never    Tobacco comments:     Quit vaping 2023   Substance and Sexual Activity    Alcohol use: Yes     Alcohol/week: 10.0 standard drinks of alcohol     Types: 10 Cans of beer per week    Drug use: Never     See above substance history    REVIEW OF SYSTEMS:   Comprehensive review of systems was discussed with the patient.  It is positive only for the above complaints.    PHYSICAL EXAMINATION:  Blood pressure (!) 171/85, pulse 91, temperature 97.9 °F (36.6 °C), temperature source Temporal, resp. rate 18, weight 70.4 kg (155 lb 3.3 oz), SpO2 96 %.    Constitutional: Non-toxic appearing.   Psychiatric: Appropriate mood and affect. Cooperative.  Voice: Wet voice, speaking in full sentences.   Neurologic: Cranial nerves grossly intact, no focal deficits.  Head and face:  Salivary glands are not enlarged. Face is symmetric. CN VII strength and sensation intact.  Skin: No concerning skin lesions.   Eyes: Vision grossly intact, bilateral extraocular movements intact  Ears: Bilateral pinna, mastoid, external ear canal normal. Hearing intact.   Nose: External nose appears normal.   Lips: No ulcers or lesions  Oral cavity: Mucosa is pink and moist. Buccal mucosa, gingiva, anterior tongue, floor of mouth, and hard palate appear normal. No leukoplakia, erythroplakia, ulceration, masses or lesions.  Oropharynx: Mucosa is pink and moist. Soft palate and base of tongue are normal. Posterior pharyngeal wall normal. Tonsils are normal. No lesions.  Neck: Soft collar removed, incision well-healed, woody, no masses or lymphadenopathy. No palpable thyroid enlargement or nodules.  Respiratory: Chest expansion symmetric, no audible stridor or stertor. Breathing is unlabored. No active cough.    PROCEDURES:    FLEXIBLE LARYNGOSCOPY  Provider: Amada Law MD  Indication: History of cancer   The procedure was explained to the patient and verbal consent was obtained.   Anesthesia: topical lidocaine and neosynephrine applied within one or both nares with an atomizer    The scope was introduced in the usual fashion.    Findings:  Mucosa: normal  Inferior turbinates: no polypoid changes or hypertrophy  Septum: no lesion or ulcer  Middle meatus: no purulence or polypoid change  Nasopharynx:  Adenoids: normal  Fossa of Rosenmuller: normal  Eustachian tubes: normal  Superior and posterior pharyngeal walls: edema of right posterior pharyngeal wall  Epiglottis, vallecula, and base of tongue: radiation changes, thickened epiglottis  Pyriform sinuses: high pooling of secretions or saliva in pyriform sinuses, mucosa normal  False vocal cords, arytenoids, aryepiglottic folds: R>L radiation edema  True vocal cords are symmetrically mobile on phonation and inspiration.   Laryngeal sensation appears intact. There are  "no masses or ulcerations.     The scope was withdrawn. The patient tolerated the procedure well with no complications.      DATA REVIEWED:     LABORATORY:      Latest Ref Rng & Units 3/5/2022     4:55 AM 3/6/2022     5:27 AM 3/7/2022     4:57 AM   Thyroid Labs   Sodium 136 - 145 mmol/L 135  136  135    Potassium 3.5 - 5.1 mmol/L 3.6  4.0  4.1    Chloride 95 - 110 mmol/L 101  104  101    Carbon Dioxide 22 - 31 mmol/L 31  31  32    Glucose 70 - 110 mg/dL 121  112  104    Blood Urea Nitrogen 9 - 21 mg/dL 13  19  22    Creatinine 0.50 - 1.40 mg/dL 0.60  0.63  0.65    Calcium 8.4 - 10.2 mg/dL 8.6  8.6  8.5    Anion Gap 8 - 16 mmol/L 3  1  2    EGFR (African American) >60 mL/min/1.73 m^2 >60  >60  >60    EGFR (Non-African American) >60 mL/min/1.73 m^2 >60  >60  >60         PATHOLOGY:    LYMPH NODE, EXTERNAL JUGULAR VEIN, EXCISION:       --ONE LYMPH NODE WITH NO MORPHOLOGIC EVIDENCE OF MALIGNANCY (0/1).     2. 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   --LARGEST PART 2 SPECIMEN METASTATIC DEPOSIT: APPROXIMATELY 31    MILLIMETERS.       --EXTRANODAL EXTENSION:  PRESENT.       --NO PERINEURAL INVASION IDENTIFIED.       --SKELETAL MUSCLE INVOLVED BY METASTATIC SQUAMOUS CELL CARCINOMA.   --PD-L1 STUDY AND EBV IMMUNOHISTOCHEMISTRY ARE PENDING AND WILL BE    REPORTED IN ADDENDA.     3. RIGHT NECK, FURTHER DESIGNATED "LEVELS 4 AND 5," DISSECTION:   --ONE OUT OF NINETEEN LYMPH NODES IS POSITIVE FOR METASTATIC    KERATINIZING SQUAMOUS CELL CARCINOMA         (1/19).   --LARGEST PART 3 SPECIMEN METASTATIC DEPOSIT: APPROXIMATELY 8    MILLIMETERS.       --EXTRANODAL EXTENSION:  PRESENT.       --NO PERINEURAL INVASION IDENTIFIED.       --SKELETAL MUSCLE WITH NO MORPHOLOGIC EVIDENCE OF MALIGNANCY.     Human Papillomavirus (HPV) Testing:   p16 " "Immunohistochemistry (IHC) as a Surrogate for Transcriptionally    Active High-Risk HPV: NEGATIVE (no staining identified).   EBV negative  PDL 1 positive    IMAGING:  Per Dr. Wallace's note,  "On April 25 of this year, a PET-CT scan was performed.  I have reviewed the images, as well as read the radiologist's interpretation.  There are some pleural-based patchy densities in the left lung base with some hypermetabolic activity.  There are also some nodules in the right middle lobe that measure up to 1.7 cm in size.  I agree with the radiologist that these lesions are indeterminate and may be infectious.  In my opinion, these are highly likely to be related to chronic aspiration...    On June 28 of this year, a CT scan of the chest was performed without IV contrast.  I have reviewed the images, as well as read the radiologist's interpretation.  There was a non-specific 10 mm pretracheal lymph node.  There was also ground-glass and reticular nodular opacities in the right lower lobe and left lower lobe, and these were thought to be infectious which I agree with.  The liver appeared to suggest cirrhosis. "    RADIOLOGY REVIEWED: images not available for review    ASSESSMENT AND PLAN:  1. 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 male with hx of SCC unknown primary, clinically ALEXANDREA from cancer standpoint.  - Encouraged patient to seek medical attention for his fall as his pain is severe enough that he is wearing a cervical collar  - RTC 4 mo for surveillance  - TSH ordered, may need dosage adjustment  - Needs carotid screening if not yet completed  - Yearly imaging in April - has Chest CT findings that are being followed     Patient encouraged to call with any questions, concerns, or new or worsening symptoms.     Follow up 4mo   "

## 2024-01-29 ENCOUNTER — DOCUMENTATION ONLY (OUTPATIENT)
Dept: ADMINISTRATIVE | Facility: OTHER | Age: 69
End: 2024-01-29
Payer: MEDICARE

## 2024-01-31 NOTE — PROGRESS NOTES
2024      Hudson Seals M.D.   [address] [facility] [facility] [place] [address]      RE: Seven Freire    MR#:  [id number]    :  1955       DX:   1. 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  Right modified radical neck dissection in 2022.  Unknown head and neck primary.  Group pathologic stage IVB, cT0 pN3b (5/39 right cervical lymph nodes were positive, including major extracapsular extension to the sternocleidomastoid muscle, internal jugular vein, adventitia of the common carotid artery and the right pharyngeal constrictors muscles; largest lymph node was 3.1 cm) cM0.  PD-L1 positive.   2.      My 2022 evaluation suggests severe depression.             Dear Hudson:     Your patient returns one year and eight months after completing postoperative irradiation to the right neck, as well as irradiation delivered to all sites of possible primary, including nasopharynx, oropharynx, hypopharynx and larynx, as well as the bilateral neck region.     On , the patient saw Dr. Sellers and there was no evidence of recurrence.     On , CT scan of the chest, abdomen and pelvis with IV contrast was negative.     On , the patient saw Dr. Law and there was no evidence of recurrence.  On , the patient saw Dr. Haddad and there was no evidence of recurrence.  Dr. Haddad ordered a PET-CT scan.     The patient symptomatically is doing well.  His depression is now well controlled, whereas previously it was very seriously out of control.  The patient was again reminded to continue to do anti-trismus exercises, and use his fluoride for dental protection and see his dentist regularly.  He is already on thyroid replacement.  He is using his compression device for lymphedema of the neck.     Physical exam shows a man in no distress.  He has mild neck lymphedema.  There is no cervical lymphadenopathy.   Examination of the oral cavity and oropharynx is unremarkable.     Fiberoptic scope examination was performed.  There is some mild edema of the right larynx consistent with the previous irradiation.  The larynx is otherwise unremarkable and the vocal cords move well.  The hypopharynx, oropharynx and nasopharynx are all unremarkable.     Assessment and Plan:  Symptomatically, he is doing well and there is no evidence for tumor recurrence on history or physical exam findings.  On February 15, the patient will have a PET-CT scan ordered under the direction of Dr. Haddad.  On April 28, the patient sees Dr. Law.  I have asked to see the patient in mid July.       30 minutes was spent on this follow up, of which 10 minutes was spent in record review, 15 minutes was spent directly with the patient, and five minutes was spent on documentation.     I appreciate the opportunity to consult on your patients.  Please call me with any questions or comments.     Sincerely,          Electronically Signed By:  MARISA Uriarte/Gaeb  DD:  01/29/2024  DT:  01/29/2024  Job #:  [id number]    CC:  Ewa Jaramillo M.D., [address] [place] [address], Fax: [phone]        Amada Law MD, [address], [place] [address], Fax: [phone]        Kalpesh Haddad M.D., [address] [address], Fax: [phone]

## 2024-02-28 NOTE — TELEPHONE ENCOUNTER
I spoke with Nohemi and cancelled [facility] for today.      ----- Message from Dayana Hutchins sent at 8/23/2022 11:57 AM CDT -----  Type: Needs Medical Advice  Who Called:  Nohemi (spouse)  Symptoms (please be specific):    How long has patient had these symptoms:    Pharmacy name and phone #:    Best Call Back Number: [phone]  Additional Information: Nohemi is calling to inform the office that her  is not feeling well and will not make his appt. with PT on 8/23 at 2:00.          INTERVAL HPI/OVERNIGHT EVENTS:  HPI:  68-year-old female past medical history of CLL presenting for epigastric pain, nausea, and vomiting. Patient reports experiencing abdominal pain, nausea and vomiting after eating an egg salad sandwich earlier today. To alleviate her symptoms, pt took zofran at home with no improvement. Reports having about 3-4 episodes of nonbloody non bilious emesis and about 4 non-watery, non bloody formed stools. States that she recently received a blood transfusion about two weeks ago when Hgb was found to be 7.4-7.5. Reports dyspnea at that time. She was then given an IV iron infusion for suspected iron deficiency anemia. She underwent an endoscopy with a second blood transfusion since Hgb was about 8 at time of procedure. Baseline Hgb about 11-12. Pt states that her last IV iron infusion was on Thursday.     Of note, pt reports chronic dry cough and nasal congestion. Attributes to history of COVID about two months ago. Denies current dyspnea.     In the ED, pt was tachycardic. Labs were significant for a WBC 19.43, Glucose 181 and ALT 48. Recent WBC from February 12 to 14.5. EKG Sinus Tachycardia at 108, personally reviewed. CT A/P: + SBO and, b/l pelvic adenopathy and air at bladder on preliminary read. CT Chest: +consolidation on RML and lingula, 12 mm thyroid nodule, b/l axillary adenopathy on preliminary read. Given morphine x1 and Ofirmev x1 for pain, Zofran x1 for nausea/vomitting and 1L IVF bolus. Admitted to medicine for further evaluation.    (27 Feb 2024 01:02)    GI consulted for SBO. Patient seen and examined at bedside. Patient states she was in normal state of health until late monday afternoon. She ate an egg salad sandwich from [employer] and began experiencing abdominal pain and vomiting a few hours later so assumed it to be food poisoning. She notes persistent vomiting so presented to ER. Patient denies diarrhea. No melena, brbpr, hematemesis or coffee ground emesis. No fever/chills. Recently diagnosed with CLL and was found to be anemic during workup/blood tests, which led her to have upper endoscopy by Dr. Conner, primary GI. No reported findings. She was scheduled to have colonoscopy at end of month due to anemia. Last Colonoscopy was 5 years ago and reported as normal. No changes in bowels, loss of appetite, unintentional weight loss or early satiety.     MEDICATIONS  (STANDING):  atorvastatin 20 milliGRAM(s) Oral at bedtime  azithromycin  IVPB 500 milliGRAM(s) IV Intermittent every 24 hours  cefTRIAXone   IVPB 1000 milliGRAM(s) IV Intermittent every 24 hours  folic acid 1 milliGRAM(s) Oral daily  heparin   Injectable 5000 Unit(s) SubCutaneous every 12 hours  influenza  Vaccine (HIGH DOSE) 0.7 milliLiter(s) IntraMuscular once  sodium chloride 0.9%. 1000 milliLiter(s) (100 mL/Hr) IV Continuous <Continuous>    MEDICATIONS  (PRN):  acetaminophen     Tablet .. 650 milliGRAM(s) Oral every 6 hours PRN Temp greater or equal to 38C (100.4F), Mild Pain (1 - 3)  aluminum hydroxide/magnesium hydroxide/simethicone Suspension 30 milliLiter(s) Oral every 4 hours PRN Dyspepsia  melatonin 3 milliGRAM(s) Oral at bedtime PRN Insomnia  ondansetron Injectable 4 milliGRAM(s) IV Push every 8 hours PRN Nausea and/or Vomiting      Allergies    penicillin (Unknown)  Zantac (Hives)    Intolerances        PAST MEDICAL & SURGICAL HISTORY:  GERD (gastroesophageal reflux disease)      Hyperlipidemia      CLL (chronic lymphocytic leukemia)      S/P lymph node biopsy      History of bone marrow biopsy          REVIEW OF SYSTEMS      General:	    Skin/Breast:  	  Ophthalmologic:  	  ENMT:	    Respiratory and Thorax:  	  Cardiovascular:	    Gastrointestinal:	    Genitourinary:	    Musculoskeletal:	    Neurological:	    Psychiatric:	    Hematology/Lymphatics:	    Endocrine:	    Allergic/Immunologic:	    PHYSICAL EXAM:   Vital Signs:  Vital Signs Last 24 Hrs  T(C): 36.9 (28 Feb 2024 06:02), Max: 36.9 (28 Feb 2024 06:02)  T(F): 98.5 (28 Feb 2024 06:02), Max: 98.5 (28 Feb 2024 06:02)  HR: 81 (28 Feb 2024 06:02) (81 - 82)  BP: 105/68 (28 Feb 2024 06:02) (105/68 - 115/71)  BP(mean): --  RR: 16 (28 Feb 2024 06:02) (16 - 16)  SpO2: 96% (28 Feb 2024 06:02) (96% - 97%)    Parameters below as of 28 Feb 2024 06:02  Patient On (Oxygen Delivery Method): room air      Daily     Daily I&O's Summary    27 Feb 2024 07:01  -  28 Feb 2024 07:00  --------------------------------------------------------  IN: 800 mL / OUT: 0 mL / NET: 800 mL        GENERAL:  Appears stated age, well-groomed, well-nourished, no distress  HEENT:  NC/AT,  conjunctivae clear and pink, no thyromegaly, nodules, adenopathy, no JVD, sclera -anicteric  CHEST:  Full & symmetric excursion, no increased effort, breath sounds clear  HEART:  Regular rhythm, S1, S2, no murmur/rub/S3/S4, no abdominal bruit, no edema  ABDOMEN:  Soft, non-tender, non-distended, normoactive bowel sounds,  no masses ,no hepato-splenomegaly, no signs of chronic liver disease  EXTEREMITIES:  no cyanosis,clubbing or edema  SKIN:  No rash/erythema/ecchymoses/petechiae/wounds/abscess/warm/dry  NEURO:  Alert, oriented, no asterixis, no tremor, no encephalopathy      LABS:                        10.2   9.95  )-----------( 360      ( 28 Feb 2024 09:00 )             34.3     02-28    145  |  110<H>  |  12  ----------------------------<  89  4.7   |  22  |  0.82    Ca    9.1      28 Feb 2024 06:17    TPro  7.0  /  Alb  3.2<L>  /  TBili  0.4  /  DBili  x   /  AST  61<H>  /  ALT  69<H>  /  AlkPhos  82  02-28      Urinalysis Basic - ( 28 Feb 2024 06:17 )    Color: x / Appearance: x / SG: x / pH: x  Gluc: 89 mg/dL / Ketone: x  / Bili: x / Urobili: x   Blood: x / Protein: x / Nitrite: x   Leuk Esterase: x / RBC: x / WBC x   Sq Epi: x / Non Sq Epi: x / Bacteria: x      amylase   lipaseLipase: 60 U/L (02-26 @ 21:20)    RADIOLOGY & ADDITIONAL TESTS:   INTERVAL HPI/OVERNIGHT EVENTS:  HPI:  68-year-old female past medical history of CLL presenting for epigastric pain, nausea, and vomiting. Patient reports experiencing abdominal pain, nausea and vomiting after eating an egg salad sandwich earlier today. To alleviate her symptoms, pt took zofran at home with no improvement. Reports having about 3-4 episodes of nonbloody non bilious emesis and about 4 non-watery, non bloody formed stools. States that she recently received a blood transfusion about two weeks ago when Hgb was found to be 7.4-7.5. Reports dyspnea at that time. She was then given an IV iron infusion for suspected iron deficiency anemia. She underwent an endoscopy with a second blood transfusion since Hgb was about 8 at time of procedure. Baseline Hgb about 11-12. Pt states that her last IV iron infusion was on Thursday.     Of note, pt reports chronic dry cough and nasal congestion. Attributes to history of COVID about two months ago. Denies current dyspnea.     In the ED, pt was tachycardic. Labs were significant for a WBC 19.43, Glucose 181 and ALT 48. Recent WBC from February 12 to 14.5. EKG Sinus Tachycardia at 108, personally reviewed. CT A/P: + SBO and, b/l pelvic adenopathy and air at bladder on preliminary read. CT Chest: +consolidation on RML and lingula, 12 mm thyroid nodule, b/l axillary adenopathy on preliminary read. Given morphine x1 and Ofirmev x1 for pain, Zofran x1 for nausea/vomitting and 1L IVF bolus. Admitted to medicine for further evaluation.    (27 Feb 2024 01:02)    GI consulted for SBO. Patient seen and examined at bedside. Patient states she was in normal state of health until late monday afternoon. She ate an egg salad sandwich from [employer] and began experiencing abdominal pain and vomiting a few hours later so assumed it to be food poisoning. She notes persistent vomiting so presented to ER. Patient denies diarrhea. No melena, brbpr, hematemesis or coffee ground emesis. No fever/chills. Recently diagnosed with CLL and was found to be anemic during workup/blood tests, which led her to have upper endoscopy by Dr. Conner, primary GI. No reported findings. She was scheduled to have colonoscopy at end of month due to anemia. Last Colonoscopy was 5 years ago and reported as normal. No changes in bowels, loss of appetite, unintentional weight loss or early satiety. Denies h/o abdominal surgery in the past.     MEDICATIONS  (STANDING):  atorvastatin 20 milliGRAM(s) Oral at bedtime  azithromycin  IVPB 500 milliGRAM(s) IV Intermittent every 24 hours  cefTRIAXone   IVPB 1000 milliGRAM(s) IV Intermittent every 24 hours  folic acid 1 milliGRAM(s) Oral daily  heparin   Injectable 5000 Unit(s) SubCutaneous every 12 hours  influenza  Vaccine (HIGH DOSE) 0.7 milliLiter(s) IntraMuscular once  sodium chloride 0.9%. 1000 milliLiter(s) (100 mL/Hr) IV Continuous <Continuous>    MEDICATIONS  (PRN):  acetaminophen     Tablet .. 650 milliGRAM(s) Oral every 6 hours PRN Temp greater or equal to 38C (100.4F), Mild Pain (1 - 3)  aluminum hydroxide/magnesium hydroxide/simethicone Suspension 30 milliLiter(s) Oral every 4 hours PRN Dyspepsia  melatonin 3 milliGRAM(s) Oral at bedtime PRN Insomnia  ondansetron Injectable 4 milliGRAM(s) IV Push every 8 hours PRN Nausea and/or Vomiting      Allergies    penicillin (Unknown)  Zantac (Hives)    Intolerances        PAST MEDICAL & SURGICAL HISTORY:  GERD (gastroesophageal reflux disease)      Hyperlipidemia      CLL (chronic lymphocytic leukemia)      S/P lymph node biopsy      History of bone marrow biopsy    	    PHYSICAL EXAM:   Vital Signs:  Vital Signs Last 24 Hrs  T(C): 36.9 (28 Feb 2024 06:02), Max: 36.9 (28 Feb 2024 06:02)  T(F): 98.5 (28 Feb 2024 06:02), Max: 98.5 (28 Feb 2024 06:02)  HR: 81 (28 Feb 2024 06:02) (81 - 82)  BP: 105/68 (28 Feb 2024 06:02) (105/68 - 115/71)  BP(mean): --  RR: 16 (28 Feb 2024 06:02) (16 - 16)  SpO2: 96% (28 Feb 2024 06:02) (96% - 97%)    Parameters below as of 28 Feb 2024 06:02  Patient On (Oxygen Delivery Method): room air      Daily     Daily I&O's Summary    27 Feb 2024 07:01  -  28 Feb 2024 07:00  --------------------------------------------------------  IN: 800 mL / OUT: 0 mL / NET: 800 mL        GENERAL:  Appears stated age, well-groomed, well-nourished, no distress  HEENT:  NC/AT,  conjunctivae clear and pink, no thyromegaly, nodules, adenopathy, no JVD, sclera -anicteric  CHEST:  Full & symmetric excursion, no increased effort, breath sounds clear  HEART:  Regular rhythm, S1, S2, no murmur/rub/S3/S4, no abdominal bruit, no edema  ABDOMEN:  Soft, non-tender, non-distended, normoactive bowel sounds,  no masses ,no hepato-splenomegaly, no signs of chronic liver disease  EXTEREMITIES:  no cyanosis,clubbing or edema  SKIN:  No rash/erythema/ecchymoses/petechiae/wounds/abscess/warm/dry  NEURO:  Alert, oriented, no asterixis, no tremor, no encephalopathy      LABS:                        10.2   9.95  )-----------( 360      ( 28 Feb 2024 09:00 )             34.3     02-28    145  |  110<H>  |  12  ----------------------------<  89  4.7   |  22  |  0.82    Ca    9.1      28 Feb 2024 06:17    TPro  7.0  /  Alb  3.2<L>  /  TBili  0.4  /  DBili  x   /  AST  61<H>  /  ALT  69<H>  /  AlkPhos  82  02-28      Urinalysis Basic - ( 28 Feb 2024 06:17 )    Color: x / Appearance: x / SG: x / pH: x  Gluc: 89 mg/dL / Ketone: x  / Bili: x / Urobili: x   Blood: x / Protein: x / Nitrite: x   Leuk Esterase: x / RBC: x / WBC x   Sq Epi: x / Non Sq Epi: x / Bacteria: x      amylase   lipaseLipase: 60 U/L (02-26 @ 21:20)    RADIOLOGY & ADDITIONAL TESTS:   GI Consult    68-year-old female with PMHx CLL recently diagnosed, presenting for epigastric pain, nausea, and vomiting. Patient reports experiencing abdominal pain, nausea and vomiting after eating an egg salad sandwich. To alleviate her symptoms, pt took zofran at home but did not improve.  Reports having about 3-4 episodes of nonbloody, no bilious emesis and about 4 non-watery, non bloody formed stools.    States that she recently received a blood transfusion about two weeks ago when Hgb was found to be 7.4-7.5 g/dL. Reports dyspnea at that time. She was then given an IV iron infusion for suspected iron deficiency anemia. She underwent an upper endoscopy with a second blood transfusion since Hgb was about 8 g/dL at time of procedure. Baseline Hgb about 11-12 g/dL. Pt states that her last IV iron infusion was on Thursday.     Of note, pt reports chronic dry cough and nasal congestion. Attributes to history of COVID about two months ago. Denies current dyspnea.     In the ED, pt was tachycardic. Labs were significant for a WBC 19.43, Glucose 181 and ALT 48. Recent WBC from February 12 to 14.5. EKG Sinus Tachycardia at 108. CT A/P: + SBO and, b/l pelvic adenopathy and air at bladder on preliminary read. CT Chest: +consolidation on RML and lingula, 12 mm thyroid nodule, b/l axillary adenopathy on preliminary read. Given morphine x1 and Ofirmev x1 for pain, Zofran x1 for nausea/vomitting and 1L IVF bolus. Admitted to medicine for further evaluation.     GI consulted for SBO. Patient seen and examined at bedside.   She denies diarrhea. No melena, BRBPR, hematemesis or coffee ground emesis. No fever/chills. Recently diagnosed with CLL and was found to be anemic during workup/blood tests, which led her to have upper endoscopy by her outpatient GI DrAtilio Conner.  No reported findings. She was scheduled to have colonoscopy at end of month due to anemia. Last Colonoscopy was 5 years ago and reported as normal. Denies unintentional weight loss or early satiety. Denies h/o abdominal surgery.     MEDICATIONS  (STANDING):  atorvastatin 20 milliGRAM(s) Oral at bedtime  azithromycin  IVPB 500 milliGRAM(s) IV Intermittent every 24 hours  cefTRIAXone   IVPB 1000 milliGRAM(s) IV Intermittent every 24 hours  folic acid 1 milliGRAM(s) Oral daily  heparin   Injectable 5000 Unit(s) SubCutaneous every 12 hours  influenza  Vaccine (HIGH DOSE) 0.7 milliLiter(s) IntraMuscular once  sodium chloride 0.9%. 1000 milliLiter(s) (100 mL/Hr) IV Continuous <Continuous>    MEDICATIONS  (PRN):  acetaminophen     Tablet .. 650 milliGRAM(s) Oral every 6 hours PRN Temp greater or equal to 38C (100.4F), Mild Pain (1 - 3)  aluminum hydroxide/magnesium hydroxide/simethicone Suspension 30 milliLiter(s) Oral every 4 hours PRN Dyspepsia  melatonin 3 milliGRAM(s) Oral at bedtime PRN Insomnia  ondansetron Injectable 4 milliGRAM(s) IV Push every 8 hours PRN Nausea and/or Vomiting      Allergies    penicillin (Unknown)  Zantac (Hives)    Intolerances        PAST MEDICAL & SURGICAL HISTORY:  GERD (gastroesophageal reflux disease)      Hyperlipidemia      CLL (chronic lymphocytic leukemia)      S/P lymph node biopsy      History of bone marrow biopsy    	  FAMILY HISTORY:  FH: breast cancer    FH: COPD (chronic obstructive pulmonary disease)    Family history of CVA    FHx: diabetes mellitus      Social History:  denies tob, EtOH        PHYSICAL EXAM:   Vital Signs:  Vital Signs Last 24 Hrs  T(C): 36.9 (28 Feb 2024 06:02), Max: 36.9 (28 Feb 2024 06:02)  T(F): 98.5 (28 Feb 2024 06:02), Max: 98.5 (28 Feb 2024 06:02)  HR: 81 (28 Feb 2024 06:02) (81 - 82)  BP: 105/68 (28 Feb 2024 06:02) (105/68 - 115/71)  BP(mean): --  RR: 16 (28 Feb 2024 06:02) (16 - 16)  SpO2: 96% (28 Feb 2024 06:02) (96% - 97%)    Parameters below as of 28 Feb 2024 06:02  Patient On (Oxygen Delivery Method): room air      Daily     Daily I&O's Summary    27 Feb 2024 07:01  -  28 Feb 2024 07:00  --------------------------------------------------------  IN: 800 mL / OUT: 0 mL / NET: 800 mL        GENERAL: currently resting in NAD  HEENT:  NC/AT,  conjunctivae clear, no JVD, sclerae anicteric  CHEST: clear  HEART:  RRR  ABDOMEN:  Soft, non-tender, non-distended, +BS,  no masses  EXTREMITIES:  no edema  SKIN:  No rash or jaundice  NEURO:  Alert, oriented      LABS:                        10.2   9.95  )-----------( 360      ( 28 Feb 2024 09:00 )             34.3     02-28    145  |  110<H>  |  12  ----------------------------<  89  4.7   |  22  |  0.82    Ca    9.1      28 Feb 2024 06:17    TPro  7.0  /  Alb  3.2<L>  /  TBili  0.4  /  DBili  x   /  AST  61<H>  /  ALT  69<H>  /  AlkPhos  82  02-28      Lipase: 60 U/L (02-26 @ 21:20)            RADIOLOGY & ADDITIONAL TESTS:    ACC: [id number] EXAM:  CT ABDOMEN AND PELVIS IC   ORDERED BY: TATA MCDANIEL     PROCEDURE DATE:  02/26/2024          INTERPRETATION:  CLINICAL INFORMATION: Abdominal pain with nausea and   vomiting.  History of CLL with recent blood transfusion.    COMPARISON: CT scan chest 2/4/2017 and CT scan abdomen and pelvis   7/1/2020.    CONTRAST/COMPLICATIONS:  IV Contrast: Omnipaque 350  90 cc administered   10 cc discarded  Oral Contrast: NONE  Complications: None reported at time of study completion    PROCEDURE:  CT of the Chest, Abdomen and Pelvis was performed.  Sagittal and coronal reformats were performed.    FINDINGS:    CHEST:    LUNGS AND LARGE AIRWAYS: PLEURA:    There are mild bilateral micronodular, tree-in-bud nodular opacities,   which may reflect inflammatory/infectious small airway disease.  There are patchy ground glass and airspace consolidations right middle and   left lingular lobes.  There is mild dependent bibasilar atelectasis.    The central airways are patent.    No pleural effusion or pneumothorax.    VESSELS: Atherosclerotic changes thoracic aorta and coronary artery   calcification.    HEART: Heart size is normal. No pericardial effusion.    MEDIASTINUM AND CHANTEL: No lymphadenopathy.    CHEST WALL AND LOWER NECK:  Bilateral axillary lymphadenopathy, the largest measuring 2 x 1.2 cm in   the right axilla.    12 mm low-density nodule right lobe of the thyroid gland.    ABDOMEN AND PELVIS:    LIVER: Within normal limits.  BILE DUCTS: Normal caliber.  GALLBLADDER: Within normal limits.  SPLEEN: Within normal limits.  PANCREAS: Within normal limits.  ADRENALS: Within normal limits.  KIDNEYS/URETERS: Within normal limits.    BLADDER: Nondependent air within the bladder. Correlate clinically for   recent instrumentation.  REPRODUCTIVE ORGANS: Retroflexed uterus.    BOWEL:   Small hiatal hernia.  Dilated fluid-filled loops of small bowel with fecal as small bowel loop   measuring greater than 3 cm in the pelvis.  There is a abrupt transition in the left lower quadrant (2:123, 601:30).  The distal small bowel loops are collapsed.  Appendix is within normal limits.  PERITONEUM: No ascites.    VESSELS: Atherosclerotic changes.  RETROPERITONEUM/LYMPH NODES:  Subcentimeter para-aortic, aortocaval and common iliac chain lymph nodes.  Bilateral pelvic sidewall obturator chain lymph nodes, the largest   measuring up to 1.8 x 3.2 cm.  Bilateral external iliac chain lymph nodes, the largest measuring up to   1.4 x 1.9 cm.    ABDOMINAL WALL: Within normal limits.    BONES:  Degenerative changes.  Spondylolisthesis L5 on S1.  Chronic right ischiopubic ramus fracture deformity.    IMPRESSION:    Patchy groundglass/consolidation right middle and left lingular lobes is   suggestive of infection/pneumonia.  Bilateral micronodular, tree-in-bud opacities are suggestive of an   laboratory/infectious airway disease.    Bilateral axillary lymphadenopathy.    12 mm nodule right lobe of the thyroid gland.  Correlate clinically.  Recommend further evaluation with thyroid ultrasonography as indicated.    Distal small bowel obstruction with transition the left lower quadrant as   discussed.    Bilateral pelvic lymphadenopathy as discussed.    Intraluminal air urinary bladder, correlate clinically for recent   instrumentation.    Other findings as discussed above.    This study was preliminary reported by [facility].    --- End of Report ---            PEPITO FERRARI MD; Attending Radiologist  This document has been electronically signed. Feb 27 2024 10:27AM

## 2024-04-08 ENCOUNTER — OFFICE VISIT (OUTPATIENT)
Dept: HEMATOLOGY/ONCOLOGY | Facility: CLINIC | Age: 69
End: 2024-04-08
Payer: MEDICARE

## 2024-04-08 VITALS
RESPIRATION RATE: 18 BRPM | DIASTOLIC BLOOD PRESSURE: 60 MMHG | TEMPERATURE: 97 F | HEIGHT: 72 IN | WEIGHT: 152.13 LBS | HEART RATE: 72 BPM | SYSTOLIC BLOOD PRESSURE: 100 MMHG | OXYGEN SATURATION: 98 % | BODY MASS INDEX: 20.6 KG/M2

## 2024-04-08 DIAGNOSIS — C80.1 SECONDARY SQUAMOUS CELL CARCINOMA OF HEAD AND NECK WITH UNKNOWN PRIMARY SITE: ICD-10-CM

## 2024-04-08 DIAGNOSIS — C79.89 SECONDARY SQUAMOUS CELL CARCINOMA OF HEAD AND NECK WITH UNKNOWN PRIMARY SITE: ICD-10-CM

## 2024-04-08 DIAGNOSIS — R91.8 PULMONARY NODULES: Primary | ICD-10-CM

## 2024-04-08 DIAGNOSIS — R13.10 DYSPHAGIA, UNSPECIFIED TYPE: ICD-10-CM

## 2024-04-08 DIAGNOSIS — I89.0 LYMPHEDEMA: ICD-10-CM

## 2024-04-08 PROCEDURE — 3288F FALL RISK ASSESSMENT DOCD: CPT | Mod: CPTII,S$GLB,, | Performed by: STUDENT IN AN ORGANIZED HEALTH CARE EDUCATION/TRAINING PROGRAM

## 2024-04-08 PROCEDURE — 99214 OFFICE O/P EST MOD 30 MIN: CPT | Mod: 25,S$GLB,, | Performed by: STUDENT IN AN ORGANIZED HEALTH CARE EDUCATION/TRAINING PROGRAM

## 2024-04-08 PROCEDURE — 1101F PT FALLS ASSESS-DOCD LE1/YR: CPT | Mod: CPTII,S$GLB,, | Performed by: STUDENT IN AN ORGANIZED HEALTH CARE EDUCATION/TRAINING PROGRAM

## 2024-04-08 PROCEDURE — 31575 DIAGNOSTIC LARYNGOSCOPY: CPT | Mod: S$GLB,,, | Performed by: STUDENT IN AN ORGANIZED HEALTH CARE EDUCATION/TRAINING PROGRAM

## 2024-04-08 PROCEDURE — 3008F BODY MASS INDEX DOCD: CPT | Mod: CPTII,S$GLB,, | Performed by: STUDENT IN AN ORGANIZED HEALTH CARE EDUCATION/TRAINING PROGRAM

## 2024-04-08 PROCEDURE — 3074F SYST BP LT 130 MM HG: CPT | Mod: CPTII,S$GLB,, | Performed by: STUDENT IN AN ORGANIZED HEALTH CARE EDUCATION/TRAINING PROGRAM

## 2024-04-08 PROCEDURE — 1125F AMNT PAIN NOTED PAIN PRSNT: CPT | Mod: CPTII,S$GLB,, | Performed by: STUDENT IN AN ORGANIZED HEALTH CARE EDUCATION/TRAINING PROGRAM

## 2024-04-08 PROCEDURE — 3078F DIAST BP <80 MM HG: CPT | Mod: CPTII,S$GLB,, | Performed by: STUDENT IN AN ORGANIZED HEALTH CARE EDUCATION/TRAINING PROGRAM

## 2024-04-08 PROCEDURE — 99999 PR PBB SHADOW E&M-EST. PATIENT-LVL III: CPT | Mod: PBBFAC,,, | Performed by: STUDENT IN AN ORGANIZED HEALTH CARE EDUCATION/TRAINING PROGRAM

## 2024-04-08 NOTE — PROGRESS NOTES
Note to patients: In accordance with the  Century Cures Act, patients are now granted immediate electronic access to their medical records. This note is primarily intended for communication among medical professionals. As a result, it may incorporate medical terminology, abbreviations, or language that could appear blunt or unfamiliar. If you have questions about this document, we encourage you to discuss it with your physician.      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 male presenting for surveillance of SCC unknown primary s/p neck dissection and CCRT completed 2022. He lost a significant amount of weight during treatment and has recently been prescribed medical marijuana to help his appetite. He was able to stop smoking but continued drinking. He was started on Synthroid for hypothyroidism.    At last visit patient reported that he fell and hit his head earlier that week but did not seek medical care. He had been wearing a soft cervical collar to help with his neck pain. He declined the recommendation to go to the ED for evaluation.    Around a month ago Dr. Jean did a scope which appeared normal. However, his most recent  PET reveal a concerning lung nodule. He recently saw Dr. Arcos for this lung nodule. He is scheduled for a bronchoscopy. He has a CT ordered today. He has a follow-up appointment with Dr. Jean this week. He saw Dr. Seals last Thursday and he had a normal scope. He does have some dysphagia and does have to drink milk to help to get the food down. He still has a lot of swelling and tightness in his neck. He has gone to PT in the past and does not want to go again. He does have a mask that he uses at night. He still has complaints of a dry mouth and drinks water and uses biotene.     Patient denies fever, chills, night sweats,  neck mass, enlarged lymph nodes outside of the head or neck, odynophagia, dysphagia, globus sensation, voice changes, cough, hemoptysis, shortness of breath, or new or concerning skin lesions.     TREATMENT HISTORY:  Neck dissection 3/2022 with Dr. Marlo LANGLEYT completed 5/2022 at [facility]    SUBSTANCE USE:  Smoking: Former, >20 pack years  Alcohol: Daily 8-10 drinks  Denies hookah, chewing tobacco, marijuana, betel nut, illicit drug, heavy cigar, vape use.    ALLERGIES:  Review of patient's allergies indicates:   Allergen Reactions    Unasyn [ampicillin-sulbactam] Hallucinations         MEDICATIONS:    Current Outpatient Medications:     albuterol (PROVENTIL/VENTOLIN HFA) 90 mcg/actuation inhaler, Inhale 1-2 puffs into the lungs every 6 (six) hours as needed for Wheezing or Shortness of Breath., Disp: , Rfl:     amLODIPine (NORVASC) 5 MG tablet, Take 5 mg by mouth once daily., Disp: , Rfl:     buPROPion (WELLBUTRIN SR) 150 MG TBSR 12 hr tablet, , Disp: , Rfl:     clopidogreL (PLAVIX) 75 mg tablet, Take 75 mg by mouth once daily., Disp: , Rfl:     dexAMETHasone (DECADRON) 4 MG Tab, 1 tablet orally every 6 hours as needed for nausea and vomiting. take with breakfast and at lunchtime, do not take after 300PM to prevent insomnia, Disp: , Rfl:     dronabinoL (MARINOL) 2.5 MG capsule,  Take 2.5 mg by mouth once daily., Disp: , Rfl:     DUEXIS 800-26.6 mg Tab, Take 1 tablet by mouth 3 (three) times daily as needed., Disp: , Rfl:     ezetimibe (ZETIA) 10 mg tablet, Take 10 mg by mouth once daily., Disp: , Rfl:     fluconazole (DIFLUCAN) 100 MG tablet, Take 2 pills the first day and then one daily after that, Disp: 11 tablet, Rfl: 0    FLUoxetine 20 MG capsule, Take 20 mg by mouth., Disp: , Rfl:     irbesartan (AVAPRO) 300 MG tablet, Take 300 mg by mouth once daily., Disp: , Rfl:     magnesium oxide (MAG-OX) 400 mg (241.3 mg magnesium) tablet, Take 1 tablet by mouth 3 (three) times daily., Disp: , Rfl:     methylphenidate HCl (RITALIN) 5 MG tablet, Take 5 mg by mouth 2 (two) times daily., Disp: , Rfl:     ondansetron (ZOFRAN-ODT) 4 MG TbDL, TAKE ONE TABLET UNDER THE TONGUE EVERY 6 HOURS FOR nausea, Disp: , Rfl:     pantoprazole (PROTONIX) 40 MG tablet, Take 40 mg by mouth every evening., Disp: , Rfl:     rosuvastatin (CRESTOR) 5 MG tablet, Take 5 mg by mouth every evening., Disp: , Rfl:     sildenafil (REVATIO) 20 mg Tab, TAKE TWO TABLETS BY MOUTH AS NEEDED EVERY DAY   30; 20 MG; 30, Disp: , Rfl:     tamsulosin (FLOMAX) 0.4 mg Cap, Take 1 capsule (0.4 mg total) by mouth every evening., Disp: 30 capsule, Rfl: 11    traZODone (DESYREL) 100 MG tablet, TAKE 1 OR 2 TABLETS BY MOUTH IN THE EVENING, Disp: 60 tablet, Rfl: 1    vitamin D (VITAMIN D3) 1000 units Tab, Take 1,000 Units by mouth once daily., Disp: , Rfl:     vitamin E 400 UNIT capsule, Take 400 Units by mouth once daily., Disp: , Rfl:     XTAMPZA ER 13.5 mg CSpT, Take by mouth., Disp: , Rfl:     levothyroxine (SYNTHROID) 75 MCG tablet, Take 1 tablet (75 mcg total) by mouth once daily., Disp: 30 tablet, Rfl: 6    OTHER HISTORY  Past medical, surgical, family, and social histories have been updated and reviewed as needed since last visit.    See above substance history    REVIEW OF SYSTEMS:   Comprehensive review of systems was discussed with the  patient.  It is positive only for the above complaints.    PHYSICAL EXAMINATION:  Blood pressure 100/60, pulse 72, temperature 97.1 °F (36.2 °C), temperature source Temporal, resp. rate 18, height 6' (1.829 m), weight 69 kg (152 lb 1.9 oz), SpO2 98 %.    Constitutional: Non-toxic appearing.   Psychiatric: Appropriate mood and affect. Cooperative.  Voice: Wet voice, speaking in full sentences.   Neurologic: Cranial nerves grossly intact, no focal deficits.  Head and face: Salivary glands are not enlarged. Face is symmetric. CN VII strength and sensation intact.  Skin: No concerning skin lesions.   Eyes: Vision grossly intact, bilateral extraocular movements intact  Ears: Bilateral pinna, mastoid, external ear canal normal. Hearing intact.   Nose: External nose appears normal.   Lips: No ulcers or lesions  Oral cavity: Mucosa is pink and moist. Buccal mucosa, gingiva, anterior tongue, floor of mouth, and hard palate appear normal. No leukoplakia, erythroplakia, ulceration, masses or lesions.  Oropharynx: Mucosa is pink and moist. Soft palate and base of tongue are normal. Posterior pharyngeal wall normal. Tonsils are normal. No lesions.  Neck: Soft collar removed, incision well-healed, woody, no masses or lymphadenopathy. No palpable thyroid enlargement or nodules.  Respiratory: Chest expansion symmetric, no audible stridor or stertor. Breathing is unlabored. No active cough.    PROCEDURES:    FLEXIBLE LARYNGOSCOPY  Provider: Amada Law MD  Indication: History of cancer   The procedure was explained to the patient and verbal consent was obtained.   Anesthesia: topical lidocaine and neosynephrine applied within one or both nares with an atomizer    The scope was introduced in the usual fashion.    Findings:  Mucosa: normal  Inferior turbinates: no polypoid changes or hypertrophy  Septum: no lesion or ulcer  Middle meatus: no purulence or polypoid change  Nasopharynx:  Adenoids: normal  Fossa of Rosenmuller:  "normal  Eustachian tubes: normal  Superior and posterior pharyngeal walls: edema of right posterior pharyngeal wall  Epiglottis, vallecula, and base of tongue: radiation changes, thickened epiglottis  Pyriform sinuses: high pooling of secretions or saliva in pyriform sinuses, mucosa normal  False vocal cords, arytenoids, aryepiglottic folds: R>L radiation edema  True vocal cords are symmetrically mobile on phonation and inspiration.   Laryngeal sensation appears intact. There are no masses or ulcerations.     The scope was withdrawn. The patient tolerated the procedure well with no complications.            DATA REVIEWED:     LABORATORY:      Latest Ref Rng & Units 3/6/2022     5:27 AM 3/7/2022     4:57 AM 12/7/2023    12:11 PM   Thyroid Labs   TSH 0.400 - 4.000 uIU/mL   2.152    Sodium 136 - 145 mmol/L 136  135     Potassium 3.5 - 5.1 mmol/L 4.0  4.1     Chloride 95 - 110 mmol/L 104  101     Carbon Dioxide 22 - 31 mmol/L 31  32     Glucose 70 - 110 mg/dL 112  104     Blood Urea Nitrogen 9 - 21 mg/dL 19  22     Creatinine 0.50 - 1.40 mg/dL 0.63  0.65     Calcium 8.4 - 10.2 mg/dL 8.6  8.5     Anion Gap 8 - 16 mmol/L 1  2     EGFR (African American) >60 mL/min/1.73 m^2 >60  >60     EGFR (Non-African American) >60 mL/min/1.73 m^2 >60  >60          PATHOLOGY:    LYMPH NODE, EXTERNAL JUGULAR VEIN, EXCISION:       --ONE LYMPH NODE WITH NO MORPHOLOGIC EVIDENCE OF MALIGNANCY (0/1).     2. 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   --LARGEST PART 2 SPECIMEN METASTATIC DEPOSIT: APPROXIMATELY 31    MILLIMETERS.       --EXTRANODAL EXTENSION:  PRESENT.       --NO PERINEURAL INVASION IDENTIFIED.       --SKELETAL MUSCLE INVOLVED BY METASTATIC SQUAMOUS CELL CARCINOMA.   --PD-L1 STUDY AND EBV IMMUNOHISTOCHEMISTRY ARE PENDING AND WILL BE    REPORTED IN " "ADDENDA.     3. RIGHT NECK, FURTHER DESIGNATED "LEVELS 4 AND 5," DISSECTION:   --ONE OUT OF NINETEEN LYMPH NODES IS POSITIVE FOR METASTATIC    KERATINIZING SQUAMOUS CELL CARCINOMA         (1/19).   --LARGEST PART 3 SPECIMEN METASTATIC DEPOSIT: APPROXIMATELY 8    MILLIMETERS.       --EXTRANODAL EXTENSION:  PRESENT.       --NO PERINEURAL INVASION IDENTIFIED.       --SKELETAL MUSCLE WITH NO MORPHOLOGIC EVIDENCE OF MALIGNANCY.     Human Papillomavirus (HPV) Testing:   p16 Immunohistochemistry (IHC) as a Surrogate for Transcriptionally    Active High-Risk HPV: NEGATIVE (no staining identified).   EBV negative  PDL 1 positive    IMAGING:  PET 2/2024 at [facility]      ASSESSMENT AND PLAN:  1. Pulmonary nodules    2. 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 Lymphedema    4. Dysphagia, unspecified type         Seven Freire is a 68 y.o. male with hx of SCC unknown primary, now with new lung nodule but no disease in the head and neck.  - Patient should be able to be intubated from above - would favor video assistance (I.e. Suresh or glidescope) with fiberoptic on standby  - Patient does not need to see both me and Dr. Seals - can alternate per patient preference  - Lung nodule management per Donya Arcos/Rodrigo/Boo  - Needs carotid screening when appropriate    Patient encouraged to call with any questions, concerns, or new or worsening symptoms.     Follow up 4mo for routine surveillance with scope   "

## 2024-04-10 ENCOUNTER — DOCUMENTATION ONLY (OUTPATIENT)
Dept: ADMINISTRATIVE | Facility: OTHER | Age: 69
End: 2024-04-10
Payer: MEDICARE

## 2024-04-12 NOTE — PROGRESS NOTES
04/10/2024      Hudson Seals M.D.   [address] [place] [address]      RE: Seven Freire    MR#:  [id number]    :  1955       DX:   1. 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  Right modified radical neck dissection in 2022.  Unknown head and neck primary.  Group pathologic stage IVB, cT0 pN3b (5/39 right cervical lymph nodes were positive, including major extracapsular extension to the sternocleidomastoid muscle, internal jugular vein, adventitia of the common carotid artery and the right pharyngeal constrictors muscles; largest lymph node was 3.1 cm) cM0.  PD-L1 positive.   2.     2024, PET-CT scan showed a 1 cm lesion in the extreme apex of the left lung. This lesion is indeterminate for cancer.        Dear Hudson:     Your patient returns one year and 11 months after completing postoperative irradiation to the right neck, as well as irradiation delivered to all sites of possible primary, including the nasopharynx, oropharynx, hypopharynx and larynx, and bilateral neck regions.  The patient received weekly cisplatinum with irradiation.     On February 15 of this year, a PET-CT scan was performed.  I have reviewed the images, as well as read the radiologist's interpretation.  The scan is negative, except for a 1 cm spiculated nodule in the extreme apex of the left lung.  There is low-level hypermetabolic activity.  The radiologist thought this was suspicious for metastasis or lung primary.  There were some other nodules and ground-glass opacities throughout both lungs that the radiologist thought may be infectious or inflammatory.     On , , and , the patient saw Dr. Haddad in follow up.     On , the patient saw Dr. Marcelle Arcos.  Dr. Arcos thought that it may be difficult to reach the lesion in the extreme apex of the left lung.  If robotic bronchoscopy for biopsy  was to be performed, Dr. Arcos thought that the patient would need clearance from ENT and Cardiology, and would need to stop Plavix for five days.     On April 8, a CT scan of the chest with IV contrast was performed.  I have reviewed the images carefully, as well as read the radiologist's interpretation.  The tiny lesion in the extreme left lung apex is completely unchanged, compared with the PET-CT scan of February 15.  There continues to be severe emphysema.  The multifocal ground-glass opacities seen on February 15 have resolved.  There are no other adverse features.      Also, on April 8, the patient saw Dr. Law.  Dr. Law's full physical examination, including a full fiberoptic scope examination was negative for cancer.  The patient did have some lymphedema which is not new.     Symptomatically, the patient is doing very well.  He denies problems.  He is doing anti-trismus exercises and is using his fluoride for dental protection.  The patient said he has a pending appointment for his dental prophylaxis.  The patient is already on thyroid replacement.     Physical exam shows a man in no distress.  There is no cervical lymphadenopathy.  Examination of the oral cavity and oropharynx is unremarkable.  Fiberoptic scope examination is not performed since it was done at Dr. Law's office two days ago.     Assessment and Plan:  Symptomatically, the patient is doing well and there is no evidence for tumor recurrence in the neck on history, physical exam or on the recent PET-CT scan.  The February 15 PET-CT scan showed a 1 cm lesion in the extreme apex of the left lung, and this has not changed in the slightest on the CT scan performed several days ago, two months after the February 15tPET scan.  I have discussed the case with Dr. Haddad, and discussed the case with the patient.  After this discussion, we have elected to repeat the CT scan of the chest with IV contrast in three months, and I will see the patient  shortly after that.  I do not think that the evidence is strong enough to treat this 1 cm lesion on a presumptive basis, especially since stereotactic irradiation would overlap with previous irradiation delivered almost two years ago.  If Dr. Arcos wants to attempt a biopsy of this, I do not have any problem with that, although this may be a difficult lesion to biopsy considering its small size and location.  The patient is scheduled to see Dr. Law on August 8.     35 minutes was spent on this follow up, of which 20 minutes was spent in record and image review, 10 minutes was spent directly with the patient, and five minutes was spent in documentation.     I appreciate the opportunity to consult on your patients.  Please call me with any questions or comments.                     Sincerely,           Electronically Signed By:  Christian Wallace M.D.      /Gabe  DD:  04/10/2024  DT:  04/10/2024  Job #:  [id number]    CC:  Zeenat Arcos M.D., [address], [address] , [place] [address], Fax: [phone]        Hudson Seals M.D., [address] [place] [address], Fax: [phone]        Amada Law MD, [address], [place] [address], Fax: [phone]        Ewa Jaramillo M.D., [address] [place] [address], Fax: [phone]        Kalpesh Haddad M.D., [address], [address], [place] [address], Fax: [phone]

## 2024-08-08 ENCOUNTER — TELEPHONE (OUTPATIENT)
Dept: HEMATOLOGY/ONCOLOGY | Facility: CLINIC | Age: 69
End: 2024-08-08
Payer: MEDICARE

## 2024-08-14 ENCOUNTER — TELEPHONE (OUTPATIENT)
Dept: HEMATOLOGY/ONCOLOGY | Facility: CLINIC | Age: 69
End: 2024-08-14
Payer: MEDICARE

## 2024-08-14 NOTE — TELEPHONE ENCOUNTER
Called to discuss the importance of ongoing care/surveillance and assist making appt with AZUCENA Polanco on home and cell.

## 2024-08-16 ENCOUNTER — DOCUMENTATION ONLY (OUTPATIENT)
Dept: ADMINISTRATIVE | Facility: OTHER | Age: 69
End: 2024-08-16
Payer: MEDICARE

## 2024-08-23 NOTE — PROGRESS NOTES
2024      Hudson Seals M.D.   [address] [place] [address]      RE: Seven Freire    MR#:  [id number]    :  1955       DX:   1. 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  Right modified radical neck dissection in 2022.  Unknown head and neck primary.  Group pathologic stage IVB, cT0 pN3b (5/39 right cervical lymph nodes were positive, including major extracapsular extension to the sternocleidomastoid muscle, internal jugular vein, adventitia of the common carotid artery and the right pharyngeal constrictors muscles; largest lymph node was 3.1 cm) cM0.  PD-L1 positive.   2.        2024, PET-CT scan showed a 1 cm lesion in the extreme apex of the left   lung.  2024 CT scan of the chest showed no change compared with 2024.  This lesion is probably benign but is still  indeterminate for cancer.         Dear Hudson:     Your patient returns two years and three months after completing postoperative irradiation to the right neck, as well as irradiation delivered to all sites of possible primary, including nasopharynx, oropharynx, hypopharynx, larynx and bilateral neck regions.  The patient received concurrent cisplatinum with irradiation.     On , the patient saw Dr. Haddad.  There was no evidence of recurrence.     On , a CT scan of the chest was performed without IV contrast.  I have reviewed the images, as well as read the radiologist's interpretation.  The small lesion in the left lung apex is essentially unchanged and measures 1.1 x 1.3 cm.  There are some airspace opacities that appeared to be inflammatory.  There is no evidence for metastasis or recurrence.     The patient has elected not to see Dr. Law in follow up, even though their office has called and requested a follow up.     The patient returns today doing well.  He was once again reminded to continue to do  anti-trismus exercises, and use fluoride for dental protection and see his dentist regularly.  The patient says he has not been as vigilant as he should be with his teeth.  The patient stopped his thyroid replacement for a while, although I have told him to return to the thyroid replacement.     Physical exam shows a man in no distress.  He does not appear to be depressed, and this was a life-threatening problem in the past that now appears to have resolved.  There is no cervical lymphadenopathy.  Examination of the oral cavity and oropharynx is unremarkable. There are no visible or palpable lesions, including no palpable lesions in the base of tongue region.     Fiberoptic scope examination of the entire pharynx and larynx is unremarkable, except for some mild laryngeal edema related to the previous irradiation.     Assessment and Plan:  Symptomatically, he is doing well and I see no evidence for tumor recurrence on history, physical exam or recent CT scan of the chest.  The patient's wife sees you every week, and he would like to change his head and neck follow up to you, rather than see Dr. Law.  The patient will see you sometime in the next month or two.  I have asked to see the patient in six months, and he will return with a PET-CT scan.     35 minutes was spent on this follow up, of which 10 minutes spent in image and record review, 20 minutes was spent directly with the patient, and five minutes was spent in documentation.     I appreciate the opportunity to consult on your patients.  Please call me with any questions or comments.     Sincerely,          Electronically Signed By:  Christian Wallace M.D.    /Gabe  DD:  08/16/2024  DT:  08/16/2024  Job #:  [id number]    CC:  Amada Law MD, [address], [place] [address], Fax: [phone]        Ewa Jaramillo M.D., [address] [place] [address], Fax: [phone]        Zeenat Arcos M.D., [address] [address]  , [place] [address], Fax: [phone]        Hudson Seals M.D., [address] [facility], [place] [address], Fax: [phone]        Kalpesh Haddad M.D., [address], [address], [place] [address], Fax: [phone]

## 2024-10-25 ENCOUNTER — DOCUMENTATION ONLY (OUTPATIENT)
Dept: ADMINISTRATIVE | Facility: OTHER | Age: 69
End: 2024-10-25
Payer: MEDICARE

## 2024-10-25 NOTE — PROGRESS NOTES
10/23/2024      Hudson Seals MD   [address] [facility]   [place] [address]      RE: Seven Ferire    MR#:  [id number]    :  1955       DX:   1. 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  Right modified radical neck dissection in 2022.  Unknown head and neck primary.  Group pathologic stage IVB, cT0 pN3b (5/39 right cervical lymph nodes were positive, including major extracapsular extension to the sternocleidomastoid muscle, internal jugular vein, adventitia of the common carotid artery and the right pharyngeal constrictors muscles; largest lymph node was 3.1 cm) cM0.  PD-L1 positive.   2.        2024, CT scan is highly suspicious for multiple lung metastases.  PET-CT scan and lung biopsy pending.          Dear Hudson:     Your patient returns two years and five months after completing postoperative irradiation to the right neck, as well as irradiation delivered to all sites of possible primary, including nasopharynx, oropharynx, hypopharynx, larynx and bilateral neck.  The patient received concurrent cisplatinum with irradiation.     The 2024 CT scan of the chest, abdomen and pelvis was performed with IV contrast.  I have reviewed the images carefully, as well as read the radiologist's impression.  The lesion that we have been watching for the last over ten  months in the left upper lobe of the lung is unchanged, compared with 2024, CT and the 2024, CT scan.  Unfortunately, there are a number of new highly suspicious nodules, including a 13 mm nodule in the right middle lobe, a 5 mm pleural-based nodule in the right posterior lung and a 13 mm nodule in the lingula.  In my opinion, these are highly suspicious for metastases.     On , the patient saw Dr. Haddad in follow up.  Dr. Haddad ordered a PET-CT scan, although the CT scan had not been scheduled until after I saw the  patient today.  Dr. Haddad also ordered a lung biopsy.     The patient denies any new symptoms.  He attributes the lung lesions to some coughing and occasional aspiration.     Assessment and Plan:  I had a detailed discussion with the patient and his wife.  I told them that I am suspicious about the lung nodules and I agree that we need to get a PET/CT scan.  The patient was very inclined not to do anything at all; however, after quite a bit of discussion, I have convinced him to get a PET-CT scan that is now scheduled for October 29.   The decision about obtaining a lung biopsy will be determined at that time, although the patient may decline lung biopsy.  I will follow up or call the patient after the October 29 PET-CT scan.       30 minutes was spent on this follow up, of which 10 minutes was spent on image review, 15 minutes was spent directly with the patient and his wife, and five minutes was spent in documentation.     I appreciate the opportunity to consult on your patients.  Please call me with any questions or comments.     Sincerely,          Electronically Signed By:  Christian Wallace M.D.    /Gabe  DD:  10/25/2024  DT:  10/25/2024  Job #:  [id number]    CC:  Zeenat Arcos M.D., [address] [facility] [address] , [place] [address], Fax: [phone]        Kalpesh Haddad M.D., [address] [facility] [address], [place] [address], Fax: [phone]        Amada Law MD, [address], [place] [address], Fax: [phone]        Ewa Jaramillo M.D., [address] [place] [address], Fax: [phone]        Hudson Seals M.D., [address] [place] [address], Fax: [phone]

## 2024-10-30 ENCOUNTER — DOCUMENTATION ONLY (OUTPATIENT)
Dept: ADMINISTRATIVE | Facility: OTHER | Age: 69
End: 2024-10-30
Payer: MEDICARE

## 2024-11-20 ENCOUNTER — DOCUMENTATION ONLY (OUTPATIENT)
Dept: ADMINISTRATIVE | Facility: OTHER | Age: 69
End: 2024-11-20
Payer: MEDICARE

## 2024-11-20 NOTE — PROGRESS NOTES
2024      Hudson Seals M.D.   [address] [place] [address]      RE: Seven Freire    MR#:  [id number]    :  1955       DX:   1. 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  Right modified radical neck dissection in 2022.  Unknown head and neck primary.  Group pathologic stage IVB, cT0 pN3b (5/39 right cervical lymph nodes were positive, including major extracapsular extension to the sternocleidomastoid muscle, internal jugular vein, adventitia of the common carotid artery and the right pharyngeal constrictors muscles; largest lymph node was 3.1 cm) cM0.  PD-L1 positive.   2.       2024, PET-CT scan is highly suspicious for at least two lung nodules in the left   lung and one in the right lung.  2024, biopsy of the left lung apex was positive for squamous cell carcinoma.  This has a high likelihood of being related to the previous head and neck unknown primary.            Dear Hudson:     This note will supplement information since I saw him on .  Your patient returns 2.5 years after completing postoperative irradiation to the right neck, as well as irradiation delivered to all sites of potential primary, including nasopharynx, oropharynx, hypopharynx, larynx and bilateral neck.  The patient received concurrent cisplatinum with irradiation.     On , a CT-directed biopsy of the left lung apex was performed.  Pathology showed squamous cell carcinoma.  Although the pathologist did not make a comment, I think that this has a high likelihood of being related to the previous head and neck unknown primary.       Assessment and Plan:  I had a detailed discussion with the patient, as well as his wife who participated via telephone.  I think there are almost certainly at least three small lung metastases.  There are probably other sites of metastasis that are too small to visualize.   After a discussion with the patient and his wife, the patient is in agreement to talk with Dr. Haddad about systemic therapy.  I am not recommending stereotactic lung irradiation at this time, although in the future that may be an appropriate option.       15 minutes was spent on this follow up, of which almost all of it was spent directly with the patient and his wife.     I appreciate the opportunity to consult on your patients.  Please call me with any questions or comments.     Sincerely,          Electronically Signed By:  Christian Wallace M.D.    /MedQ  DD:  11/20/2024  DT:  11/20/2024  Job #:  [id number]    CC:  Hudson Seals M.D., [address] [place] [address], Fax: [phone]        Amada Law MD, [address], [place] [address], Fax: [phone]        Zeenat Arcos M.D., [address] [facility] [address] , [place] [address], Fax: [phone]        Kalpesh Haddad M.D., [address] [facility] [address], [place] [address], Fax: [phone]        Ewa Jaramillo M.D., [address] [place] [address], Fax: [phone]

## 2025-02-07 ENCOUNTER — DOCUMENTATION ONLY (OUTPATIENT)
Dept: ADMINISTRATIVE | Facility: OTHER | Age: 70
End: 2025-02-07

## 2025-02-07 NOTE — PROGRESS NOTES
2025      Hudson Seals MD   [address] [facility]   [place] [address]      RE: Seven Freire    MR#:  [id number]    :  1955       DX:   1. 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  Right modified radical neck dissection in 2022.  Unknown head and neck primary.  Group pathologic stage IVB, cT0 pN3b (5/39 right cervical lymph nodes were positive, including major extracapsular extension to the sternocleidomastoid muscle, internal jugular vein, adventitia of the common carotid artery and the right pharyngeal constrictors muscles; largest lymph node was 3.1 cm) cM0.  PD-L1 positive.   2.       2024, PET-CT scan is highly suspicious for at least two lung nodules in the left   lung and one in the right lung.  2024, biopsy of the left lung apex was positive for squamous cell carcinoma.  This has a high likelihood of being related to the previous head and neck unknown primary.           Dear Hudson:     Your patient returns two years and nine months after completing definitive postoperative irradiation to the right neck, as well as irradiation was delivered to all sites of potential primary, including nasopharynx, oropharynx, hypopharynx, larynx and bilateral neck.  The patient received concurrent cisplatinum with irradiation.     As you know, on  of last year, left lung apex biopsy was positive for squamous cell carcinoma.  This has a high likelihood of being related to the previous head and neck unknown primary.         On , Dr. Haddad started Keytruda.  The patient has been tolerating that well.  The patient was also referred to Palliative Care for pain control.     Yesterday, , a PET-CT scan was performed.  I have reviewed the images, as well as read the radiologist's interpretation.  The biopsy-proven squamous cell carcinoma of the left lung apex has slightly increased in  size.  The right middle lobe cancer is also present.  There are several new tiny lung nodules and some of these are indeterminate.  There is a tiny non-specific area of hypermetabolism in L3 vertebral body.  There is no definite evidence of other cancer.     Symptomatically, the patient is doing well.  He has no symptoms from the known lung metastasis.  Of course, we cannot be completely positive that the squamous cell carcinoma, that was biopsy proven in the left lung apex was not a lung primary.     Physical exam shows a man in no distress.  He continues to have a good performance status.     Assessment and Plan:  The November 2024, biopsy of the left lung apex was positive for squamous cell carcinoma.  This has a high likelihood of being related to the previous head and neck unknown primary.   The patient started Keytruda on January 28.  The PET-CT scan from yesterday was performed, about a week after starting Keytruda, and it shows very few changes compared to the previous scan.  The patient will continue on Keytruda.  I have asked to see the patient in about four months, which will probably be after his next PET-CT scan.     25 minutes was spent on this follow up, of which 10 minutes was spent in image and record review, 10 minutes was spent directly with the patient and his wife, and five minutes was spent in documentation.     I appreciate the opportunity to consult on your patients.  Please call me with any questions or comments.     Sincerely,          Electronically Signed By:  MARISA Uriarte/Gabe  DD:  02/07/2025  DT:  02/07/2025  Job #:  [id number]    CC:  Amada Law MD, [address], [place] [address], Fax: [phone]        Kalpesh Haddad M.D., [address] [facility], [address] [address], Fax: [phone]        Hudson Seals M.D., [address] [facility] [place] [address], Fax: [phone]        Ewa Jaramillo M.D., [address] [facility],  [place] [address], Fax: [phone]        Zeenat Arcos M.D., [address] [facility] , [place] [address], Fax: [phone]

## 2025-06-15 NOTE — PROGRESS NOTES
HPI    Pt here for complete exam with the complaint of decreased VA in both   distance and near. Finished chemo about a year ago. No gtts, wears OTC   readers for near. Pt also states eyes feel like they have dirt and grit in   them at times.     Last eye exam about 25 years ago   Last edited by Javan Rush, OD on 8/24/2023 11:40 AM.            Assessment /Plan     For exam results, see Encounter Report.    Age-related nuclear cataract, bilateral    Vitreous floaters of both eyes    Glaucoma screening    Dry eye syndrome of bilateral lacrimal glands    Trichiasis of right lower eyelid    Trichiasis of left upper eyelid    Presbyopia      Mild, not yet visually significant. Surgery not recommended at this time. Ed pt on symptoms of worsening cataracts including reduced BCVA and glare. Monitor yearly for changes.    Ed pt on benign nature of vitreous floaters. Reviewed signs and symptoms of a retinal detachment thoroughly and ed pt to RTC asap if experienced.    Moderate CD ratio with slightly narrow angles. IOP WNL. No known family history. Monitor yearly for changes. Consider RNFL OCT at next visit.    4-6.  Pt states he's been having FBS and a gritty sensation in his eyes. Moderate SPK upon examination OD>OS, especially near areas of trichiasis. Successfully epilated ~4 lashes at the slit lamp using topical proparacaine and forceps. Pt tolerated well. Ed pt that mild distance blur likely secondary to SPK and dry eye. Gave OTC artificial tear recommendations to begin using BID-QID along with gel drop QHS. Ed pt to RTC if symptoms worsen or fail to resolve - especially blur - and can perform refraction after SPK resolves.    7. Mildly reduced DVA - ed pt that it's likely due to SPK and dry eye. Advised pt to use OTC AT's and RTC if  blur does not resolve for refraction. Otherwise, ok to continue with OTC readers. Advised pt that ok to increase to a +2.00-+2.50 as needed for small print.    RTC: 1 year for  comprehensive exam or sooner prn                    No

## 2025-06-27 ENCOUNTER — DOCUMENTATION ONLY (OUTPATIENT)
Dept: ADMINISTRATIVE | Facility: OTHER | Age: 70
End: 2025-06-27

## 2025-06-27 NOTE — PROGRESS NOTES
2025      Hudson Seals M.D.   [address] [facility] [place] [address]      RE: Seven Freire    MR#:  [id number]    :  1955       DX:     1. 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  Right modified radical neck dissection in 2022.  Unknown head and neck primary.  Group pathologic stage IVB, cT0 pN3b (5/39 right cervical lymph nodes were positive, including major extracapsular extension to the sternocleidomastoid muscle, internal jugular vein, adventitia of the common carotid artery and the right pharyngeal constrictors muscles; largest lymph node was 3.1 cm) cM0.  PD-L1 positive.   2.       2024, PET-CT scan is highly suspicious for at least two lung nodules in the left   lung and one in the right lung.  2024, biopsy of the left lung apex was positive for squamous cell carcinoma.  This has a high likelihood of being related to the previous head and neck unknown primary.           Dear Hudson:     Your patient returns three years and one month after completing definitive postoperative irradiation to the right neck, as well as irradiation that was delivered to all potential sites of primary, including nasopharynx, oropharynx, hypopharynx, larynx and bilateral neck.  The patient received concurrent cisplatinum with irradiation.     As you know, since  of this year, the patient has been receiving Keytruda for the squamous cell carcinoma metastatic to  the lungs.   On , the patient saw Dr. Haddad and Dr. Haddad continued the Keytruda.     On , a PET-CT scan was performed.  I have reviewed the images, as well as read the radiologist's interpretation.  There is no evidence of tumor recurrence in the head and neck area.  Unfortunately, I agree with the radiologist that the hypermetabolic lung nodules had enlarged in size and increased in hypermetabolic activity.  There is a  hypermetabolic 1.6 cm nodule in the left upper lobe of the lung with an SUV of 9.1, and previously this was 1 cm with an SUV of 3.1.  There is a hypermetabolic 2.1 cm nodule in the right middle lobe with an SUV of 6.8, and previously this was 1.8 cm with an SUV of 4.6.  The left lung apex has not changed significantly.     Symptomatically, the patient is doing well.  His only complaint is fatigue.  He is doing anti-trismus exercises and using fluoride for dental protection.  The patient is already on thyroid replacement.     Physical exam shows a man in no distress.  A more  detailed physical examination was not performed since this would not change treatment recommendations.     Assessment and Plan:  Unfortunately, it appears like there is progression of the three lung metastases.  On July 3, the patient will see Dr. Haddad to get his input.  There is no evidence for tumor recurrence of the head and neck.  I have asked to see the patient in six months, but he will be seeing Dr. Haddad routinely in the meantime.     25 minutes was spent with this follow up of which 10 minutes was spent was record and image review, 10 minutes was spent directly with the patient and his wife, and 5 minutes was spent in documentation.     I appreciate the opportunity to consult on your patients.  Please call me with any questions or comments.    Sincerely,          Electronically Signed By:  MARISA Uriarte/Gabe  DD:  06/27/2025  DT:  06/27/2025  Job #:  [id number]    CC:  Amada Law MD, [address], [place] [address], Fax: [phone]        Kalpesh Haddad M.D., [address] [address], Fax: [phone]        Hudson Seals M.D., [address] [place] [address], Fax: [phone]        Ewa Jaramillo M.D., [address] [place] [address], Fax: [phone]        Zeenat Arcos M.D., [address] [facility] [address] , [place] [address], Fax:  [phone]